# Patient Record
Sex: FEMALE | Race: WHITE | Employment: UNEMPLOYED | URBAN - METROPOLITAN AREA
[De-identification: names, ages, dates, MRNs, and addresses within clinical notes are randomized per-mention and may not be internally consistent; named-entity substitution may affect disease eponyms.]

---

## 2017-01-03 ENCOUNTER — OFFICE VISIT (OUTPATIENT)
Dept: FAMILY MEDICINE CLINIC | Age: 57
End: 2017-01-03

## 2017-01-03 VITALS — DIASTOLIC BLOOD PRESSURE: 90 MMHG | HEART RATE: 130 BPM | SYSTOLIC BLOOD PRESSURE: 152 MMHG

## 2017-01-03 DIAGNOSIS — E66.01 MORBID OBESITY DUE TO EXCESS CALORIES (HCC): ICD-10-CM

## 2017-01-03 DIAGNOSIS — E78.00 PURE HYPERCHOLESTEROLEMIA: ICD-10-CM

## 2017-01-03 DIAGNOSIS — I10 ESSENTIAL HYPERTENSION: Primary | ICD-10-CM

## 2017-01-03 DIAGNOSIS — M17.0 PRIMARY OSTEOARTHRITIS OF BOTH KNEES: ICD-10-CM

## 2017-01-03 DIAGNOSIS — G47.33 OBSTRUCTIVE SLEEP APNEA SYNDROME: ICD-10-CM

## 2017-01-03 DIAGNOSIS — R53.83 FATIGUE, UNSPECIFIED TYPE: ICD-10-CM

## 2017-01-03 DIAGNOSIS — Z23 FLU VACCINE NEED: ICD-10-CM

## 2017-01-03 LAB
ALBUMIN SERPL-MCNC: 4.6 G/DL (ref 3.4–5)
ALP BLD-CCNC: 81 U/L (ref 40–129)
ALT SERPL-CCNC: 28 U/L (ref 10–40)
ANION GAP SERPL CALCULATED.3IONS-SCNC: 22 MMOL/L (ref 3–16)
AST SERPL-CCNC: 24 U/L (ref 15–37)
BILIRUB SERPL-MCNC: 0.5 MG/DL (ref 0–1)
BILIRUBIN DIRECT: <0.2 MG/DL (ref 0–0.3)
BILIRUBIN, INDIRECT: NORMAL MG/DL (ref 0–1)
BUN BLDV-MCNC: 11 MG/DL (ref 7–20)
CALCIUM SERPL-MCNC: 9.5 MG/DL (ref 8.3–10.6)
CHLORIDE BLD-SCNC: 99 MMOL/L (ref 99–110)
CHOLESTEROL, TOTAL: 272 MG/DL (ref 0–199)
CO2: 22 MMOL/L (ref 21–32)
CREAT SERPL-MCNC: 0.7 MG/DL (ref 0.6–1.1)
GFR AFRICAN AMERICAN: >60
GFR NON-AFRICAN AMERICAN: >60
GLUCOSE BLD-MCNC: 102 MG/DL (ref 70–99)
HDLC SERPL-MCNC: 46 MG/DL (ref 40–60)
LDL CHOLESTEROL CALCULATED: ABNORMAL MG/DL
LDL CHOLESTEROL DIRECT: 185 MG/DL
POTASSIUM SERPL-SCNC: 4.1 MMOL/L (ref 3.5–5.1)
SODIUM BLD-SCNC: 143 MMOL/L (ref 136–145)
TOTAL PROTEIN: 7.6 G/DL (ref 6.4–8.2)
TRIGL SERPL-MCNC: 433 MG/DL (ref 0–150)
TSH SERPL DL<=0.05 MIU/L-ACNC: 2.43 UIU/ML (ref 0.27–4.2)
URIC ACID, SERUM: 5.3 MG/DL (ref 2.6–6)
VLDLC SERPL CALC-MCNC: ABNORMAL MG/DL

## 2017-01-03 PROCEDURE — 90471 IMMUNIZATION ADMIN: CPT | Performed by: INTERNAL MEDICINE

## 2017-01-03 PROCEDURE — 90686 IIV4 VACC NO PRSV 0.5 ML IM: CPT | Performed by: INTERNAL MEDICINE

## 2017-01-03 PROCEDURE — 36415 COLL VENOUS BLD VENIPUNCTURE: CPT | Performed by: INTERNAL MEDICINE

## 2017-01-03 PROCEDURE — 99214 OFFICE O/P EST MOD 30 MIN: CPT | Performed by: INTERNAL MEDICINE

## 2017-01-03 RX ORDER — LOSARTAN POTASSIUM 50 MG/1
50 TABLET ORAL DAILY
Qty: 90 TABLET | Refills: 3 | Status: SHIPPED | OUTPATIENT
Start: 2017-01-03 | End: 2017-01-03 | Stop reason: SDUPTHER

## 2017-01-03 RX ORDER — LOSARTAN POTASSIUM 50 MG/1
50 TABLET ORAL DAILY
Qty: 90 TABLET | Refills: 3 | Status: ON HOLD | OUTPATIENT
Start: 2017-01-03 | End: 2017-12-09

## 2017-01-03 ASSESSMENT — ENCOUNTER SYMPTOMS
RESPIRATORY NEGATIVE: 1
ALLERGIC/IMMUNOLOGIC NEGATIVE: 1
GASTROINTESTINAL NEGATIVE: 1
EYES NEGATIVE: 1

## 2017-01-06 RX ORDER — ATORVASTATIN CALCIUM 20 MG/1
20 TABLET, FILM COATED ORAL DAILY
Qty: 30 TABLET | Refills: 3 | Status: SHIPPED | OUTPATIENT
Start: 2017-01-06 | End: 2017-05-20 | Stop reason: SDUPTHER

## 2017-01-27 RX ORDER — NIFEDIPINE 60 MG/1
TABLET, EXTENDED RELEASE ORAL
Qty: 90 TABLET | Refills: 2 | Status: SHIPPED | OUTPATIENT
Start: 2017-01-27 | End: 2017-12-19 | Stop reason: SDUPTHER

## 2017-04-25 ENCOUNTER — OFFICE VISIT (OUTPATIENT)
Dept: ORTHOPEDIC SURGERY | Age: 57
End: 2017-04-25

## 2017-04-25 VITALS — HEIGHT: 68 IN | BODY MASS INDEX: 44.41 KG/M2 | WEIGHT: 293 LBS

## 2017-04-25 DIAGNOSIS — M17.10 LOCALIZED OSTEOARTHROSIS, LOWER LEG: ICD-10-CM

## 2017-04-25 DIAGNOSIS — M25.562 ACUTE PAIN OF LEFT KNEE: Primary | ICD-10-CM

## 2017-04-25 DIAGNOSIS — M17.12 PRIMARY OSTEOARTHRITIS OF LEFT KNEE: ICD-10-CM

## 2017-04-25 PROCEDURE — 99213 OFFICE O/P EST LOW 20 MIN: CPT | Performed by: ORTHOPAEDIC SURGERY

## 2017-04-25 PROCEDURE — 20610 DRAIN/INJ JOINT/BURSA W/O US: CPT | Performed by: ORTHOPAEDIC SURGERY

## 2017-04-25 PROCEDURE — 73560 X-RAY EXAM OF KNEE 1 OR 2: CPT | Performed by: ORTHOPAEDIC SURGERY

## 2017-04-25 RX ORDER — OXYCODONE HYDROCHLORIDE AND ACETAMINOPHEN 5; 325 MG/1; MG/1
1 TABLET ORAL EVERY 6 HOURS PRN
Qty: 28 TABLET | Refills: 0 | Status: SHIPPED | OUTPATIENT
Start: 2017-04-25 | End: 2017-05-02

## 2017-04-28 ENCOUNTER — TELEPHONE (OUTPATIENT)
Dept: ORTHOPEDIC SURGERY | Age: 57
End: 2017-04-28

## 2017-05-22 RX ORDER — ATORVASTATIN CALCIUM 20 MG/1
TABLET, FILM COATED ORAL
Qty: 30 TABLET | Refills: 0 | Status: SHIPPED | OUTPATIENT
Start: 2017-05-22 | End: 2017-06-06 | Stop reason: SDUPTHER

## 2017-06-06 RX ORDER — ATORVASTATIN CALCIUM 20 MG/1
TABLET, FILM COATED ORAL
Qty: 30 TABLET | Refills: 1 | Status: SHIPPED | OUTPATIENT
Start: 2017-06-06 | End: 2017-06-12 | Stop reason: SDUPTHER

## 2017-06-12 ENCOUNTER — OFFICE VISIT (OUTPATIENT)
Dept: FAMILY MEDICINE CLINIC | Age: 57
End: 2017-06-12

## 2017-06-12 VITALS
DIASTOLIC BLOOD PRESSURE: 86 MMHG | HEIGHT: 68 IN | WEIGHT: 293 LBS | HEART RATE: 90 BPM | RESPIRATION RATE: 18 BRPM | OXYGEN SATURATION: 97 % | BODY MASS INDEX: 44.41 KG/M2 | SYSTOLIC BLOOD PRESSURE: 136 MMHG

## 2017-06-12 DIAGNOSIS — E78.00 PURE HYPERCHOLESTEROLEMIA: ICD-10-CM

## 2017-06-12 DIAGNOSIS — I10 ESSENTIAL HYPERTENSION: Primary | ICD-10-CM

## 2017-06-12 DIAGNOSIS — E66.01 MORBID OBESITY DUE TO EXCESS CALORIES (HCC): ICD-10-CM

## 2017-06-12 PROCEDURE — 99214 OFFICE O/P EST MOD 30 MIN: CPT | Performed by: INTERNAL MEDICINE

## 2017-06-12 RX ORDER — ATORVASTATIN CALCIUM 20 MG/1
TABLET, FILM COATED ORAL
Qty: 90 TABLET | Refills: 3 | Status: SHIPPED | OUTPATIENT
Start: 2017-06-12 | End: 2018-11-29 | Stop reason: SDUPTHER

## 2017-06-12 ASSESSMENT — ENCOUNTER SYMPTOMS
RESPIRATORY NEGATIVE: 1
ALLERGIC/IMMUNOLOGIC NEGATIVE: 1

## 2017-07-05 ENCOUNTER — NURSE ONLY (OUTPATIENT)
Dept: FAMILY MEDICINE CLINIC | Age: 57
End: 2017-07-05

## 2017-07-05 DIAGNOSIS — E78.00 PURE HYPERCHOLESTEROLEMIA: ICD-10-CM

## 2017-07-05 DIAGNOSIS — I10 ESSENTIAL HYPERTENSION: ICD-10-CM

## 2017-07-05 DIAGNOSIS — E66.01 MORBID OBESITY DUE TO EXCESS CALORIES (HCC): ICD-10-CM

## 2017-07-05 LAB
ALBUMIN SERPL-MCNC: 4.2 G/DL (ref 3.4–5)
ALP BLD-CCNC: 91 U/L (ref 40–129)
ALT SERPL-CCNC: 17 U/L (ref 10–40)
ANION GAP SERPL CALCULATED.3IONS-SCNC: 14 MMOL/L (ref 3–16)
AST SERPL-CCNC: 13 U/L (ref 15–37)
BILIRUB SERPL-MCNC: 0.4 MG/DL (ref 0–1)
BILIRUBIN DIRECT: <0.2 MG/DL (ref 0–0.3)
BILIRUBIN, INDIRECT: ABNORMAL MG/DL (ref 0–1)
BUN BLDV-MCNC: 10 MG/DL (ref 7–20)
CALCIUM SERPL-MCNC: 9.5 MG/DL (ref 8.3–10.6)
CHLORIDE BLD-SCNC: 101 MMOL/L (ref 99–110)
CHOLESTEROL, TOTAL: 127 MG/DL (ref 0–199)
CO2: 26 MMOL/L (ref 21–32)
CREAT SERPL-MCNC: 0.6 MG/DL (ref 0.6–1.1)
GFR AFRICAN AMERICAN: >60
GFR NON-AFRICAN AMERICAN: >60
GLUCOSE BLD-MCNC: 103 MG/DL (ref 70–99)
HDLC SERPL-MCNC: 34 MG/DL (ref 40–60)
LDL CHOLESTEROL CALCULATED: 61 MG/DL
POTASSIUM SERPL-SCNC: 4.7 MMOL/L (ref 3.5–5.1)
SODIUM BLD-SCNC: 141 MMOL/L (ref 136–145)
TOTAL PROTEIN: 7.3 G/DL (ref 6.4–8.2)
TRIGL SERPL-MCNC: 161 MG/DL (ref 0–150)
VLDLC SERPL CALC-MCNC: 32 MG/DL

## 2017-07-05 PROCEDURE — 36415 COLL VENOUS BLD VENIPUNCTURE: CPT | Performed by: INTERNAL MEDICINE

## 2017-07-20 DIAGNOSIS — M17.10 LOCALIZED OSTEOARTHROSIS, LOWER LEG: Primary | ICD-10-CM

## 2017-07-21 ENCOUNTER — TELEPHONE (OUTPATIENT)
Dept: ORTHOPEDIC SURGERY | Age: 57
End: 2017-07-21

## 2017-08-22 ENCOUNTER — OFFICE VISIT (OUTPATIENT)
Dept: ORTHOPEDIC SURGERY | Age: 57
End: 2017-08-22

## 2017-08-22 VITALS — HEIGHT: 68 IN | WEIGHT: 293 LBS | BODY MASS INDEX: 44.41 KG/M2

## 2017-08-22 DIAGNOSIS — M84.452G: ICD-10-CM

## 2017-08-22 DIAGNOSIS — M84.469G INSUFFICIENCY FRACTURE OF TIBIA, WITH DELAYED HEALING, SUBSEQUENT ENCOUNTER: ICD-10-CM

## 2017-08-22 DIAGNOSIS — M17.12 PRIMARY OSTEOARTHRITIS OF LEFT KNEE: Primary | ICD-10-CM

## 2017-08-22 DIAGNOSIS — M23.204 DEGENERATIVE TEAR OF MEDIAL MENISCUS OF LEFT KNEE: ICD-10-CM

## 2017-08-22 PROBLEM — M84.469A INSUFFICIENCY FRACTURE OF TIBIA: Status: ACTIVE | Noted: 2017-08-22

## 2017-08-22 PROBLEM — M84.453A INSUFFICIENCY FRACTURE OF MEDIAL FEMORAL CONDYLE (HCC): Status: ACTIVE | Noted: 2017-08-22

## 2017-08-22 PROCEDURE — 99213 OFFICE O/P EST LOW 20 MIN: CPT | Performed by: ORTHOPAEDIC SURGERY

## 2017-08-22 RX ORDER — CITALOPRAM 40 MG/1
40 TABLET ORAL NIGHTLY
COMMUNITY

## 2017-09-28 ENCOUNTER — TELEPHONE (OUTPATIENT)
Dept: ORTHOPEDIC SURGERY | Age: 57
End: 2017-09-28

## 2017-10-06 ENCOUNTER — OFFICE VISIT (OUTPATIENT)
Dept: FAMILY MEDICINE CLINIC | Age: 57
End: 2017-10-06

## 2017-10-06 VITALS
BODY MASS INDEX: 44.41 KG/M2 | DIASTOLIC BLOOD PRESSURE: 76 MMHG | HEIGHT: 68 IN | SYSTOLIC BLOOD PRESSURE: 118 MMHG | WEIGHT: 293 LBS

## 2017-10-06 DIAGNOSIS — Z01.818 PRE-OP EXAM: ICD-10-CM

## 2017-10-06 DIAGNOSIS — G89.29 CHRONIC PAIN OF LEFT KNEE: Primary | ICD-10-CM

## 2017-10-06 DIAGNOSIS — M25.562 CHRONIC PAIN OF LEFT KNEE: Primary | ICD-10-CM

## 2017-10-06 PROCEDURE — 93000 ELECTROCARDIOGRAM COMPLETE: CPT | Performed by: PHYSICIAN ASSISTANT

## 2017-10-06 PROCEDURE — 99242 OFF/OP CONSLTJ NEW/EST SF 20: CPT | Performed by: PHYSICIAN ASSISTANT

## 2017-10-06 NOTE — PROGRESS NOTES
[facility]   Pre-operative History and Physical      DIAGNOSIS:    1. Chronic pain of left knee     2. Pre-op exam  EKG 12 Lead         PROCEDURE:  Arthroscopic left knee surgery      History Obtained From:  patient    HISTORY OF PRESENT ILLNESS:    Forbes Gaucher 1960 is a 59 y.o. female who presents for pre-operative evaluation.     Past Medical History:    Past Medical History:   Diagnosis Date    Anxiety     with panic attacks    Arthritis     knees    Chronic back pain     Depression     Hyperlipidemia     borderline    Hypertension     IBS (irritable bowel syndrome)     with rectal bleeding    Knee osteoarthritis 1/25/2013    Morbid obesity ([address]     Positive H. pylori test 12/10/12    Unspecified sleep apnea     not diagnosed     Past Surgical History:    Past Surgical History:   Procedure Laterality Date    CHOLECYSTECTOMY  2008    COLONOSCOPY      due age 46    COLONOSCOPY  12/10/12    FOOT FRACTURE SURGERY  2002    FOOT SURGERY  11/2011    Right peroneal tendon Wendy Parry); ACP peroneal tendons with US guided injection    TONSILLECTOMY  1963    TOTAL KNEE ARTHROPLASTY  1/29/13    RIGHT TOTAL KNEE REPLACEMENT    UPPER GASTROINTESTINAL ENDOSCOPY  12/10/2012    gastritis       Current Medication  Current Outpatient Prescriptions   Medication Sig Dispense Refill    CLONAZEPAM PO Take 1 mg by mouth daily as needed      buPROPion (WELLBUTRIN XL) 300 MG extended release tablet Take 300 mg by mouth every morning      citalopram (CELEXA) 40 MG tablet Take 40 mg by mouth nightly       atorvastatin (LIPITOR) 20 MG tablet TAKE 1 TABLET BY MOUTH DAILY 90 tablet 3    NIFEdipine (NIFEDICAL XL) 60 MG extended release tablet TAKE 1 TABLET BY MOUTH DAILY 90 tablet 2    losartan (COZAAR) 50 MG tablet Take 1 tablet by mouth daily 90 tablet 3    traZODone (DESYREL) 50 MG tablet Take 100 mg by mouth nightly       clonazepam (KLONOPIN) 2 MG tablet Take 2 mg by mouth nightly       VILAZODONE HCL 40 MG Tablet Take 1 tablet by mouth nightly   2     No current facility-administered medications for this visit. Allergies:  Review of patient's allergies indicates no known allergies. History of allergic reaction to anesthesia:  No  Teeth: no dentures or caps    Social History:   History   Smoking Status    Former Smoker    Packs/day: 1.50    Years: 21.00    Quit date: 12/1/2015   Smokeless Tobacco    Current User     Comment: still chews nicotine gum 2mg (20 pieces per day)     The patient states she drinks 0 per week. Family History:   Family History   Problem Relation Age of Onset    Cancer Mother      pancreatic cancer    High Cholesterol Mother     Arthritis Mother     High Blood Pressure Father     Diabetes Father     Obesity Father     Other Father      gout, spinal stenosis    Arthritis Sister     Arthritis Maternal Grandmother     Substance Abuse Maternal Grandfather     High Blood Pressure Paternal Grandmother        REVIEW OF SYSTEMS:    CONSTITUTIONAL:  negative  EYES:  negative  HEENT:  negative  RESPIRATORY:  negative  CARDIOVASCULAR:  negative  GASTROINTESTINAL:  negative  GENITOURINARY:  negative  INTEGUMENT/BREAST:  negative  HEMATOLOGIC/LYMPHATIC:  negative  ALLERGIC/IMMUNOLOGIC:  negative  ENDOCRINE:  negative  MUSCULOSKELETAL:  positive for left knee pain  NEUROLOGICAL:  negative    PHYSICAL EXAM:      /76  Ht 5' 8\" (1.727 m)  Wt (!) 306 lb (138.8 kg)  BMI 46.53 kg/m2    Eyes:  Lids and lashes normal, pupils equal, round and reactive to light, extra ocular muscles intact, sclera clear, conjunctiva normal  Head/ENT:  Normocephalic, without obvious abnormality, atraumatic, sinuses nontender on palpation, external ears without lesions, oral pharynx with moist mucus membranes, tonsils without erythema or exudates, gums normal and good dentition.   Neck:  Supple, symmetrical, trachea midline, no adenopathy, thyroid symmetric, not enlarged and no tenderness, skin normal  Heart:  Normal apical impulse, regular rate and rhythm, normal S1 and S2, no S3 or S4, and no murmur noted  Lungs:  No increased work of breathing, good air exchange, clear to auscultation bilaterally, no crackles or wheezing  Abdomen:   normal bowel sounds, soft, non-distended, non-tender, no masses palpated, no hepatosplenomegally  Extremities:  No clubbing, cyanosis, or edema      DATA:  EKG: see attached    ASSESSMENT AND PLAN:    1. Patient is a 59 y.o. female with above specified procedure planned on 10/13/17 with Dr. Fernando Choi at [facility]. 2. Stop asa and nsaid medications 7-10 days prior to procedure. Eulalio Swann is Medically stable for surgery. Report will be faxed.

## 2017-10-13 ENCOUNTER — HOSPITAL ENCOUNTER (OUTPATIENT)
Dept: SURGERY | Age: 57
Discharge: HOME | End: 2017-10-13
Attending: ORTHOPAEDIC SURGERY | Admitting: ORTHOPAEDIC SURGERY

## 2017-10-13 VITALS
SYSTOLIC BLOOD PRESSURE: 168 MMHG | BODY MASS INDEX: 44.41 KG/M2 | TEMPERATURE: 98.4 F | HEART RATE: 74 BPM | HEIGHT: 68 IN | DIASTOLIC BLOOD PRESSURE: 104 MMHG | RESPIRATION RATE: 16 BRPM | OXYGEN SATURATION: 92 % | WEIGHT: 293 LBS

## 2017-10-13 DIAGNOSIS — R52 PAIN: ICD-10-CM

## 2017-10-13 LAB
ANION GAP SERPL CALCULATED.3IONS-SCNC: 13 MMOL/L (ref 3–16)
BUN BLDV-MCNC: 15 MG/DL (ref 7–20)
CALCIUM SERPL-MCNC: 8.7 MG/DL (ref 8.3–10.6)
CHLORIDE BLD-SCNC: 100 MMOL/L (ref 99–110)
CO2: 26 MMOL/L (ref 21–32)
CREAT SERPL-MCNC: 0.6 MG/DL (ref 0.6–1.1)
GFR AFRICAN AMERICAN: >60
GFR NON-AFRICAN AMERICAN: >60
GLUCOSE BLD-MCNC: 102 MG/DL (ref 70–99)
POTASSIUM SERPL-SCNC: 3.7 MMOL/L (ref 3.5–5.1)
SODIUM BLD-SCNC: 139 MMOL/L (ref 136–145)

## 2017-10-13 RX ORDER — MEPERIDINE HYDROCHLORIDE 50 MG/ML
12.5 INJECTION INTRAMUSCULAR; INTRAVENOUS; SUBCUTANEOUS EVERY 5 MIN PRN
Status: DISCONTINUED | OUTPATIENT
Start: 2017-10-13 | End: 2017-10-14 | Stop reason: HOSPADM

## 2017-10-13 RX ORDER — OXYCODONE HYDROCHLORIDE AND ACETAMINOPHEN 5; 325 MG/1; MG/1
1 TABLET ORAL PRN
Status: ACTIVE | OUTPATIENT
Start: 2017-10-13 | End: 2017-10-13

## 2017-10-13 RX ORDER — MORPHINE SULFATE 2 MG/ML
2 INJECTION, SOLUTION INTRAMUSCULAR; INTRAVENOUS EVERY 5 MIN PRN
Status: DISCONTINUED | OUTPATIENT
Start: 2017-10-13 | End: 2017-10-14 | Stop reason: HOSPADM

## 2017-10-13 RX ORDER — OXYCODONE HYDROCHLORIDE AND ACETAMINOPHEN 5; 325 MG/1; MG/1
2 TABLET ORAL PRN
Status: ACTIVE | OUTPATIENT
Start: 2017-10-13 | End: 2017-10-13

## 2017-10-13 RX ORDER — MORPHINE SULFATE 2 MG/ML
1 INJECTION, SOLUTION INTRAMUSCULAR; INTRAVENOUS EVERY 5 MIN PRN
Status: DISCONTINUED | OUTPATIENT
Start: 2017-10-13 | End: 2017-10-14 | Stop reason: HOSPADM

## 2017-10-13 RX ORDER — SODIUM CHLORIDE 0.9 % (FLUSH) 0.9 %
10 SYRINGE (ML) INJECTION PRN
Status: DISCONTINUED | OUTPATIENT
Start: 2017-10-13 | End: 2017-10-14 | Stop reason: HOSPADM

## 2017-10-13 RX ORDER — PROMETHAZINE HYDROCHLORIDE 25 MG/ML
6.25 INJECTION, SOLUTION INTRAMUSCULAR; INTRAVENOUS
Status: DISCONTINUED | OUTPATIENT
Start: 2017-10-13 | End: 2017-10-14 | Stop reason: HOSPADM

## 2017-10-13 RX ORDER — LIDOCAINE HYDROCHLORIDE 10 MG/ML
1 INJECTION, SOLUTION EPIDURAL; INFILTRATION; INTRACAUDAL; PERINEURAL
Status: COMPLETED | OUTPATIENT
Start: 2017-10-13 | End: 2017-10-13

## 2017-10-13 RX ORDER — DIPHENHYDRAMINE HYDROCHLORIDE 50 MG/ML
12.5 INJECTION INTRAMUSCULAR; INTRAVENOUS
Status: ACTIVE | OUTPATIENT
Start: 2017-10-13 | End: 2017-10-13

## 2017-10-13 RX ORDER — ACETAMINOPHEN 10 MG/ML
1000 INJECTION, SOLUTION INTRAVENOUS
Status: ACTIVE | OUTPATIENT
Start: 2017-10-13 | End: 2017-10-13

## 2017-10-13 RX ORDER — HYDRALAZINE HYDROCHLORIDE 20 MG/ML
5 INJECTION INTRAMUSCULAR; INTRAVENOUS EVERY 10 MIN PRN
Status: DISCONTINUED | OUTPATIENT
Start: 2017-10-13 | End: 2017-10-14 | Stop reason: HOSPADM

## 2017-10-13 RX ORDER — ONDANSETRON 2 MG/ML
4 INJECTION INTRAMUSCULAR; INTRAVENOUS PRN
Status: DISCONTINUED | OUTPATIENT
Start: 2017-10-13 | End: 2017-10-14 | Stop reason: HOSPADM

## 2017-10-13 RX ORDER — PROMETHAZINE HYDROCHLORIDE 25 MG/1
25 TABLET ORAL EVERY 6 HOURS PRN
Qty: 28 TABLET | Refills: 0 | Status: SHIPPED | OUTPATIENT
Start: 2017-10-13 | End: 2017-10-20

## 2017-10-13 RX ORDER — LABETALOL HYDROCHLORIDE 5 MG/ML
5 INJECTION, SOLUTION INTRAVENOUS EVERY 10 MIN PRN
Status: DISCONTINUED | OUTPATIENT
Start: 2017-10-13 | End: 2017-10-14 | Stop reason: HOSPADM

## 2017-10-13 RX ORDER — SODIUM CHLORIDE 0.9 % (FLUSH) 0.9 %
10 SYRINGE (ML) INJECTION EVERY 12 HOURS SCHEDULED
Status: DISCONTINUED | OUTPATIENT
Start: 2017-10-13 | End: 2017-10-14 | Stop reason: HOSPADM

## 2017-10-13 RX ORDER — SODIUM CHLORIDE, SODIUM LACTATE, POTASSIUM CHLORIDE, CALCIUM CHLORIDE 600; 310; 30; 20 MG/100ML; MG/100ML; MG/100ML; MG/100ML
INJECTION, SOLUTION INTRAVENOUS CONTINUOUS
Status: DISCONTINUED | OUTPATIENT
Start: 2017-10-13 | End: 2017-10-14 | Stop reason: HOSPADM

## 2017-10-13 RX ADMIN — LIDOCAINE HYDROCHLORIDE 1 ML: 10 INJECTION, SOLUTION EPIDURAL; INFILTRATION; INTRACAUDAL; PERINEURAL at 09:52

## 2017-10-13 RX ADMIN — SODIUM CHLORIDE, SODIUM LACTATE, POTASSIUM CHLORIDE, CALCIUM CHLORIDE: 600; 310; 30; 20 INJECTION, SOLUTION INTRAVENOUS at 09:52

## 2017-10-13 NOTE — PROGRESS NOTES
Transfer received from PACU. Awake and oriented. SaO2 wnl- using IS with good effort. Drinking fluids and eating crackers. Son called to bedside, call light in reach.

## 2017-10-13 NOTE — PROCEDURES
Eulalio Swann   1960  59 y.o. ULTRASOUND GUIDED ADDUCTOR CANAL FEMORAL NERVE BLOCK   Discussed with patient risks, benefits and alternatives of block (including but not limited to infection, bleeding, and damage to nerves) all questions answered patient agrees with planned procedure  Surgical procedure: Repair insufficiency fracture knee  Side: left  Requested by  for post operative pain control time-out completed   Pt sedated with Versed 2mg  Monitor on, patient supine, and site prepped with Chloraprep  Lidocaine 1% used for topical local anesthetic  21 gauge 100 mm Stimuplex needle used  Continuous Inplane dynamic ultrasound technique used, relevant anatomy identified (nerves, vessels, muscles), Local anesthetic spread visualized around nerves  Bupivacaine 0.5% 20cc injected in 5 cc increments after negative aspiration each time. Pt tolerated procedure well. No complications.   Comments:     Titi Turner

## 2017-10-13 NOTE — ANESTHESIA PRE-OP
 clonazepam (KLONOPIN) 2 MG tablet Take 2 mg by mouth nightly        No current facility-administered medications for this encounter.         Allergies:  No Known Allergies    Problem List:    Patient Active Problem List   Diagnosis Code    Essential hypertension I10    Pure hypercholesterolemia E78.00    Anxiety F41.9    Essential hypertension I10    Obstructive sleep apnea syndrome G47.33    Chronic back pain M54.9, G89.29    IBS (irritable bowel syndrome) K58.9    Morbid obesity due to excess calories ([facility]) E66.01    Positive H. pylori test A04.8    Knee osteoarthritis M17.10    Right TKR Z96.659    Localized osteoarthrosis, lower leg M17.10    Acute pain of left knee M25.562    Insufficiency fracture of medial femoral condyle (HCC) M84.453A    Degenerative tear of medial meniscus of left knee M23.204    Primary osteoarthritis of left knee M17.12    Insufficiency fracture of tibia M84.469A       Past Medical History:        Diagnosis Date    Anxiety     with panic attacks    Arthritis     knees    Chronic back pain     Depression     Hyperlipidemia     borderline    Hypertension     IBS (irritable bowel syndrome)     with rectal bleeding    Knee osteoarthritis 1/25/2013    Morbid obesity ([address]     Positive H. pylori test 12/10/12    Unspecified sleep apnea     not diagnosed       Past Surgical History:        Procedure Laterality Date    CHOLECYSTECTOMY  2008    COLONOSCOPY      due age 46    COLONOSCOPY  12/10/12    FOOT FRACTURE SURGERY  2002    FOOT SURGERY  11/2011    Right peroneal tendon Manolo Yoder); ACP peroneal tendons with US guided injection    TONSILLECTOMY  1963    TOTAL KNEE ARTHROPLASTY  1/29/13    RIGHT TOTAL KNEE REPLACEMENT    UPPER GASTROINTESTINAL ENDOSCOPY  12/10/2012    gastritis       Social History:    Social History   Substance Use Topics    Smoking status: Former Smoker     Packs/day: 1.50     Years: 21.00     Quit date: 12/1/2015    Smokeless Former Smoker     Packs/day: 1.50     Years: 21.00     Quit date: 12/1/2015    Smokeless tobacco: Never Used      Comment: still chews nicotine gum 2mg (20 pieces per day)    Alcohol use No       LABS:    CBC  Lab Results   Component Value Date/Time    WBC 7.1 01/30/2016 01:43 PM    HGB 16.3 (H) 01/30/2016 01:43 PM    HCT 48.7 (H) 01/30/2016 01:43 PM     01/30/2016 01:43 PM     RENAL  Lab Results   Component Value Date/Time     10/13/2017 09:35 AM    K 3.7 10/13/2017 09:35 AM     10/13/2017 09:35 AM    CO2 26 10/13/2017 09:35 AM    BUN 15 10/13/2017 09:35 AM    CREATININE 0.6 10/13/2017 09:35 AM    GLUCOSE 102 (H) 10/13/2017 09:35 AM     COAGS  Lab Results   Component Value Date/Time    PROTIME 10.0 01/24/2013 01:50 PM    INR 0.87 01/24/2013 01:50 PM    APTT 31.9 01/24/2013 01:50 PM         Ova MD Edenilson   10/13/2017

## 2017-10-15 NOTE — OP NOTE
[id number]                               OPERATIVE REPORT    PATIENT NAME: Mihir Clemens                      :             1960  MED REC NO:   [id number]                           ROOM:  ACCOUNT NO:   [de-identified]                           ADMISSION DATE:  10/13/2017  PROVIDER:     Jeanine Bolivar MD    DATE OF PROCEDURE:  10/13/2017    PREOPERATIVE DIAGNOSES:  Left knee medial meniscus tear,  osteoarthritis, _____ loose body, insufficiency fracture of the medial  femoral condyle and medial tibial plateau. OPERATION PERFORMED:  Left knee video arthroscopy, partial medial  meniscectomy, loose body removal and internal fixation of  insufficiency fractures in the medial femoral condyle and medial  tibial plateau with bone substitute under fluoroscopic guidance. SURGEON:  Jeanine Bolivar MD    ANESTHESIA:  General, leg block. BLOOD LOSS:  Less than 5 mL. COMPLICATIONS:  None noted. INDICATIONS FOR OPERATION:  A 49-year-old female who presented with  recurrent left knee pain consistent with loose body, internal  derangement, medial meniscus tear, and osteoarthritis and an MRI  confirmed that she also had insufficiency fractures of the medial  femoral condyle and medial tibial plateau. After failure of other  modalities, she elected for the recommendation of surgical  intervention. DESCRIPTION OF THE OPERATION:  The patient was taken to the operating  room where general anesthetic was obtained. Antibiotics were given IV  piggyback preoperatively. A tourniquet was placed around the left  proximal thigh, but was not inflated during the case. The leg was  sterilely prepped and draped and a two-portal arthroscopy of the left  knee was carried out in the usual fashion using a 30-degree  arthroscope.   Upon entry into the knee, she was noted to have a grade  3 osteoarthritis and some focal grade 4

## 2017-10-17 NOTE — ADDENDUM NOTE
Encounter addended by: Sarai Valencia MA on: 10/17/2017  9:25 AM<BR>    Actions taken: Letter status changed

## 2017-10-23 ENCOUNTER — OFFICE VISIT (OUTPATIENT)
Dept: ORTHOPEDIC SURGERY | Age: 57
End: 2017-10-23

## 2017-10-23 VITALS — WEIGHT: 293 LBS | HEIGHT: 68 IN | BODY MASS INDEX: 44.41 KG/M2

## 2017-10-23 DIAGNOSIS — M17.12 PRIMARY OSTEOARTHRITIS OF LEFT KNEE: Primary | ICD-10-CM

## 2017-10-23 DIAGNOSIS — M84.453A INSUFFICIENCY FRACTURE OF MEDIAL FEMORAL CONDYLE (HCC): ICD-10-CM

## 2017-10-23 DIAGNOSIS — M23.204 DEGENERATIVE TEAR OF MEDIAL MENISCUS OF LEFT KNEE: ICD-10-CM

## 2017-10-23 DIAGNOSIS — M84.469D INSUFFICIENCY FRACTURE OF TIBIA WITH ROUTINE HEALING, SUBSEQUENT ENCOUNTER: ICD-10-CM

## 2017-10-23 PROCEDURE — 99024 POSTOP FOLLOW-UP VISIT: CPT | Performed by: ORTHOPAEDIC SURGERY

## 2017-10-23 NOTE — PATIENT INSTRUCTIONS
Patient Education        Knee: Exercises  Your Care Instructions  Here are some examples of exercises for your knee. Start each exercise slowly. Ease off the exercise if you start to have pain. Your doctor or physical therapist will tell you when you can start these exercises and which ones will work best for you. How to do the exercises  Quad sets    1. Sit with your leg straight and supported on the floor or a firm bed. (If you feel discomfort in the front or back of your knee, place a small towel roll under your knee.)  2. Tighten the muscles on top of your thigh by pressing the back of your knee flat down to the floor. (If you feel discomfort under your kneecap, place a small towel roll under your knee.)  3. Hold for about 6 seconds, then rest for up to 10 seconds. 4. Do 8 to 12 repetitions several times a day. Straight-leg raises to the front    1. Lie on your back with your good knee bent so that your foot rests flat on the floor. Your injured leg should be straight. Make sure that your low back has a normal curve. You should be able to slip your flat hand in between the floor and the small of your back, with your palm touching the floor and your back touching the back of your hand. 2. Tighten the thigh muscles in the injured leg by pressing the back of your knee flat down to the floor. Hold your knee straight. 3. Keeping the thigh muscles tight, lift your injured leg up so that your heel is about 12 inches off the floor. Hold for about 6 seconds and then lower slowly. 4. Do 8 to 12 repetitions, 3 times a day. Straight-leg raises to the outside    1. Lie on your side, with your injured leg on top. 2. Tighten the front thigh muscles of your injured leg to keep your knee straight. 3. Keep your hip and your leg straight in line with the rest of your body, and keep your knee pointing forward. Do not drop your hip back.   4. Lift your injured leg straight up toward the ceiling, about 12 inches off the floor. Hold for about 6 seconds, then slowly lower your leg. 5. Do 8 to 12 repetitions. Straight-leg raises to the back    1. Lie on your stomach, and lift your leg straight up behind you (toward the ceiling). 2. Lift your toes about 6 inches off the floor, hold for about 6 seconds, then lower slowly. 3. Do 8 to 12 repetitions. Straight-leg raises to the inside    1. Lie on the side of your body with the injured leg. 2. You can either prop your other (good) leg up on a chair, or you can bend your good knee and put that foot in front of your injured knee. Do not drop your hip back. 3. Tighten the muscles on the front of your thigh to straighten your injured knee. 4. Keep your kneecap pointing forward, and lift your whole leg up toward the ceiling about 6 inches. Hold for about 6 seconds, then lower slowly. 5. Do 8 to 12 repetitions. Heel dig bridging    1. Lie on your back with both knees bent and your ankles bent so that only your heels are digging into the floor. Your knees should be bent about 90 degrees. 2. Then push your heels into the floor, squeeze your buttocks, and lift your hips off the floor until your shoulders, hips, and knees are all in a straight line. 3. Hold for about 6 seconds as you continue to breathe normally, and then slowly lower your hips back down to the floor and rest for up to 10 seconds. 4. Do 8 to 12 repetitions. Hamstring curls    1. Lie on your stomach with your knees straight. If your kneecap is uncomfortable, roll up a washcloth and put it under your leg just above your kneecap. 2. Lift the foot of your injured leg by bending the knee so that you bring the foot up toward your buttock. If this motion hurts, try it without bending your knee quite as far. This may help you avoid any painful motion. 3. Slowly lower your leg back to the floor. 4. Do 8 to 12 repetitions.   5. With permission from your doctor or physical therapist, you may also want to add a cuff weight to your ankle (not more than 5 pounds). With weight, you do not have to lift your leg more than 12 inches to get a hamstring workout. Shallow standing knee bends    Note: Do this exercise only if you have very little pain; if you have no clicking, locking, or giving way if you have an injured knee; and if it does not hurt while you are doing 8 to 12 repetitions. 1. Stand with your hands lightly resting on a counter or chair in front of you. Put your feet shoulder-width apart. 2. Slowly bend your knees so that you squat down like you are going to sit in a chair. Make sure your knees do not go in front of your toes. 3. Lower yourself about 6 inches. Your heels should remain on the floor at all times. 4. Rise slowly to a standing position. Heel raises    1. Stand with your feet a few inches apart, with your hands lightly resting on a counter or chair in front of you. 2. Slowly raise your heels off the floor while keeping your knees straight. 3. Hold for about 6 seconds, then slowly lower your heels to the floor. 4. Do 8 to 12 repetitions several times during the day. Follow-up care is a key part of your treatment and safety. Be sure to make and go to all appointments, and call your doctor if you are having problems. It's also a good idea to know your test results and keep a list of the medicines you take. Where can you learn more? Go to [email or web address] Solaire.Matrimony.com. org and sign in to your [employer] account. Enter C292 in the [facility] box to learn more about \"Knee: Exercises. \"     If you do not have an account, please click on the \"Sign Up Now\" link. Current as of: March 21, 2017  Content Version: 11.3  © 3166-8939 [employer]. Care instructions adapted under license by [facility] ([facility]).  If you have questions about a medical condition or this instruction, always ask your healthcare professional. [place] [address] any warranty or liability for your use of this cuff weight to your ankle (not more than 5 pounds). With weight, you do not have to lift your leg more than 12 inches to get a hamstring workout. Shallow standing knee bends    Note: Do this exercise only if you have very little pain; if you have no clicking, locking, or giving way if you have an injured knee; and if it does not hurt while you are doing 8 to 12 repetitions. 5. Stand with your hands lightly resting on a counter or chair in front of you. Put your feet shoulder-width apart. 6. Slowly bend your knees so that you squat down like you are going to sit in a chair. Make sure your knees do not go in front of your toes. 7. Lower yourself about 6 inches. Your heels should remain on the floor at all times. 8. Rise slowly to a standing position. Heel raises    5. Stand with your feet a few inches apart, with your hands lightly resting on a counter or chair in front of you. 6. Slowly raise your heels off the floor while keeping your knees straight. 7. Hold for about 6 seconds, then slowly lower your heels to the floor. 8. Do 8 to 12 repetitions several times during the day. Follow-up care is a key part of your treatment and safety. Be sure to make and go to all appointments, and call your doctor if you are having problems. It's also a good idea to know your test results and keep a list of the medicines you take. Where can you learn more? Go to https://AppMeshpeCloudvu.Slyce. org and sign in to your [employer] account. Enter U147 in the [facility] box to learn more about \"Knee: Exercises. \"     If you do not have an account, please click on the \"Sign Up Now\" link. Current as of: March 21, 2017  Content Version: 11.3  © 9347-1517 [employer]. Care instructions adapted under license by [facility] ([facility]).  If you have questions about a medical condition or this instruction, always ask your healthcare professional. [address] any warranty or liability for your

## 2017-11-21 ENCOUNTER — OFFICE VISIT (OUTPATIENT)
Dept: ORTHOPEDIC SURGERY | Age: 57
End: 2017-11-21

## 2017-11-21 VITALS
WEIGHT: 293 LBS | SYSTOLIC BLOOD PRESSURE: 129 MMHG | DIASTOLIC BLOOD PRESSURE: 82 MMHG | HEIGHT: 68 IN | HEART RATE: 77 BPM | BODY MASS INDEX: 44.41 KG/M2

## 2017-11-21 DIAGNOSIS — M17.12 PRIMARY OSTEOARTHRITIS OF LEFT KNEE: Primary | ICD-10-CM

## 2017-11-21 DIAGNOSIS — M84.453A INSUFFICIENCY FRACTURE OF MEDIAL FEMORAL CONDYLE (HCC): ICD-10-CM

## 2017-11-21 DIAGNOSIS — M84.469D INSUFFICIENCY FRACTURE OF TIBIA WITH ROUTINE HEALING, SUBSEQUENT ENCOUNTER: ICD-10-CM

## 2017-11-21 PROCEDURE — 99024 POSTOP FOLLOW-UP VISIT: CPT | Performed by: ORTHOPAEDIC SURGERY

## 2017-11-21 NOTE — PROGRESS NOTES
FOLLOW-UP VISIT      The patient returns for follow-up s/p left knee video arthroscopy with partial medial meniscectomy, loose body removal, and internal fixation of insufficiency fractures in the medial tibial plateau and medial femoral condyle with bone substitute. Date of Surgery    10/13/17    Wound Status    Incisions are healing well with no surrounding erythema, and minimal ecchymosis. Exam    She has no signs of infection or DVT. She is walking without ambulatory aids happy with her result to date. Plan    Slow return to normal activity at home directed physical therapy program.    Follow-up Appointment    P.thomas.

## 2017-11-27 ENCOUNTER — OFFICE VISIT (OUTPATIENT)
Dept: FAMILY MEDICINE CLINIC | Age: 57
End: 2017-11-27

## 2017-11-27 VITALS
BODY MASS INDEX: 44.41 KG/M2 | OXYGEN SATURATION: 97 % | WEIGHT: 293 LBS | HEART RATE: 93 BPM | DIASTOLIC BLOOD PRESSURE: 86 MMHG | HEIGHT: 68 IN | SYSTOLIC BLOOD PRESSURE: 122 MMHG | TEMPERATURE: 98.6 F

## 2017-11-27 DIAGNOSIS — Z01.818 PRE-OP EXAM: ICD-10-CM

## 2017-11-27 DIAGNOSIS — Z23 NEEDS FLU SHOT: ICD-10-CM

## 2017-11-27 DIAGNOSIS — E66.01 MORBID OBESITY DUE TO EXCESS CALORIES (HCC): Primary | ICD-10-CM

## 2017-11-27 PROCEDURE — G8417 CALC BMI ABV UP PARAM F/U: HCPCS | Performed by: PHYSICIAN ASSISTANT

## 2017-11-27 PROCEDURE — 99242 OFF/OP CONSLTJ NEW/EST SF 20: CPT | Performed by: PHYSICIAN ASSISTANT

## 2017-11-27 PROCEDURE — G8484 FLU IMMUNIZE NO ADMIN: HCPCS | Performed by: PHYSICIAN ASSISTANT

## 2017-11-27 PROCEDURE — 90471 IMMUNIZATION ADMIN: CPT | Performed by: PHYSICIAN ASSISTANT

## 2017-11-27 PROCEDURE — 90715 TDAP VACCINE 7 YRS/> IM: CPT | Performed by: PHYSICIAN ASSISTANT

## 2017-11-27 PROCEDURE — 90472 IMMUNIZATION ADMIN EACH ADD: CPT | Performed by: PHYSICIAN ASSISTANT

## 2017-11-27 PROCEDURE — 3014F SCREEN MAMMO DOC REV: CPT | Performed by: PHYSICIAN ASSISTANT

## 2017-11-27 PROCEDURE — 90686 IIV4 VACC NO PRSV 0.5 ML IM: CPT | Performed by: PHYSICIAN ASSISTANT

## 2017-11-27 PROCEDURE — 3017F COLORECTAL CA SCREEN DOC REV: CPT | Performed by: PHYSICIAN ASSISTANT

## 2017-11-27 PROCEDURE — G8427 DOCREV CUR MEDS BY ELIG CLIN: HCPCS | Performed by: PHYSICIAN ASSISTANT

## 2017-11-27 NOTE — PROGRESS NOTES
Vaccine Information Sheet, \"Influenza - Inactivated\"  given to Eulalio Swann, or parent/legal guardian of  Eulalio Swann and verbalized understanding. Patient responses:    Have you ever had a reaction to a flu vaccine? No  Are you able to eat eggs without adverse effects? Yes  Do you have any current illness? No  Have you ever had Guillian [place] Syndrome? No    Flu vaccine given per order. Please see immunization tab.
reactive to light, extra ocular muscles intact, sclera clear, conjunctiva normal  Head/ENT:  Normocephalic, without obvious abnormality, atraumatic, sinuses nontender on palpation, external ears without lesions, oral pharynx with moist mucus membranes, tonsils without erythema or exudates, gums normal and good dentition. Neck:  Supple, symmetrical, trachea midline, no adenopathy, thyroid symmetric, not enlarged and no tenderness, skin normal  Heart:  Normal apical impulse, regular rate and rhythm, normal S1 and S2, no S3 or S4, and no murmur noted  Lungs:  No increased work of breathing, good air exchange, clear to auscultation bilaterally, no crackles or wheezing  Abdomen:  No scars, normal bowel sounds, soft, non-distended, non-tender, no masses palpated, no hepatosplenomegally  Extremities:  No clubbing, cyanosis, or edema      DATA:  EKG: see attached    ASSESSMENT AND PLAN:    1. Patient is a 59 y.o. female with above specified procedure planned on 12/8/17 with Dr. June Singleton at [facility]. 2. Stop asa and nsaid medications 7-10 days prior to procedure. Venessa Garcia is Medically stable for surgery. Report will be faxed.

## 2017-12-05 ENCOUNTER — TELEPHONE (OUTPATIENT)
Dept: FAMILY MEDICINE CLINIC | Age: 57
End: 2017-12-05

## 2017-12-05 RX ORDER — ONDANSETRON 2 MG/ML
4 INJECTION INTRAMUSCULAR; INTRAVENOUS ONCE
Status: CANCELLED | OUTPATIENT
Start: 2017-12-05 | End: 2017-12-05

## 2017-12-05 RX ORDER — SODIUM CHLORIDE, SODIUM LACTATE, POTASSIUM CHLORIDE, CALCIUM CHLORIDE 600; 310; 30; 20 MG/100ML; MG/100ML; MG/100ML; MG/100ML
INJECTION, SOLUTION INTRAVENOUS CONTINUOUS
Status: CANCELLED | OUTPATIENT
Start: 2017-12-05

## 2017-12-05 RX ORDER — DEXAMETHASONE SODIUM PHOSPHATE 4 MG/ML
10 INJECTION, SOLUTION INTRA-ARTICULAR; INTRALESIONAL; INTRAMUSCULAR; INTRAVENOUS; SOFT TISSUE ONCE
Status: CANCELLED | OUTPATIENT
Start: 2017-12-05 | End: 2017-12-05

## 2017-12-05 RX ORDER — HEPARIN SODIUM 5000 [USP'U]/ML
5000 INJECTION, SOLUTION INTRAVENOUS; SUBCUTANEOUS ONCE
Status: CANCELLED | OUTPATIENT
Start: 2017-12-05 | End: 2017-12-05

## 2017-12-05 RX ORDER — APREPITANT 40 MG/1
40 CAPSULE ORAL ONCE
Status: CANCELLED | OUTPATIENT
Start: 2017-12-05 | End: 2017-12-05

## 2017-12-05 RX ORDER — METOCLOPRAMIDE HYDROCHLORIDE 5 MG/ML
10 INJECTION INTRAMUSCULAR; INTRAVENOUS ONCE
Status: CANCELLED | OUTPATIENT
Start: 2017-12-05 | End: 2017-12-05

## 2017-12-06 ENCOUNTER — HOSPITAL ENCOUNTER (OUTPATIENT)
Dept: PREADMISSION TESTING | Age: 57
Discharge: HOME | End: 2017-12-06
Attending: SURGERY | Admitting: SURGERY

## 2017-12-06 VITALS
HEIGHT: 68 IN | DIASTOLIC BLOOD PRESSURE: 82 MMHG | RESPIRATION RATE: 18 BRPM | HEART RATE: 88 BPM | WEIGHT: 293 LBS | OXYGEN SATURATION: 92 % | BODY MASS INDEX: 44.41 KG/M2 | TEMPERATURE: 97.4 F | SYSTOLIC BLOOD PRESSURE: 123 MMHG

## 2017-12-06 LAB
A/G RATIO: 1.3 (ref 1.1–2.2)
ABO/RH: NORMAL
ALBUMIN SERPL-MCNC: 4.3 G/DL (ref 3.4–5)
ALP BLD-CCNC: 103 U/L (ref 40–129)
ALT SERPL-CCNC: 21 U/L (ref 10–40)
ANION GAP SERPL CALCULATED.3IONS-SCNC: 13 MMOL/L (ref 3–16)
ANTIBODY SCREEN: NORMAL
AST SERPL-CCNC: 15 U/L (ref 15–37)
BILIRUB SERPL-MCNC: 0.4 MG/DL (ref 0–1)
BUN BLDV-MCNC: 15 MG/DL (ref 7–20)
CALCIUM SERPL-MCNC: 9.2 MG/DL (ref 8.3–10.6)
CHLORIDE BLD-SCNC: 101 MMOL/L (ref 99–110)
CO2: 27 MMOL/L (ref 21–32)
CREAT SERPL-MCNC: 0.7 MG/DL (ref 0.6–1.1)
GFR AFRICAN AMERICAN: >60
GFR NON-AFRICAN AMERICAN: >60
GLOBULIN: 3.3 G/DL
GLUCOSE BLD-MCNC: 96 MG/DL (ref 70–99)
HCT VFR BLD CALC: 45.4 % (ref 36–48)
HEMOGLOBIN: 15.6 G/DL (ref 12–16)
MCH RBC QN AUTO: 29.4 PG (ref 26–34)
MCHC RBC AUTO-ENTMCNC: 34.4 G/DL (ref 31–36)
MCV RBC AUTO: 85.4 FL (ref 80–100)
PDW BLD-RTO: 14 % (ref 12.4–15.4)
PLATELET # BLD: 311 K/UL (ref 135–450)
PMV BLD AUTO: 6.9 FL (ref 5–10.5)
POTASSIUM SERPL-SCNC: 4.1 MMOL/L (ref 3.5–5.1)
RBC # BLD: 5.31 M/UL (ref 4–5.2)
SODIUM BLD-SCNC: 141 MMOL/L (ref 136–145)
TOTAL PROTEIN: 7.6 G/DL (ref 6.4–8.2)
WBC # BLD: 7.3 K/UL (ref 4–11)

## 2017-12-06 NOTE — PROGRESS NOTES
The following educational items and goals will be achieved upon completion of the patient's Pre-admission testing experience:             Identify the learner who is being assessed for education:  patient                    Ability to Learn:  Exhibits ability to grasp concepts and respond to questions: High  Ready to Learn: Yes  calm   Preferred Method of Learning:  written  Barriers to Learning: Verbalizes interest  Special Considerations due to cultural, Caodaism, spiritual beliefs:  No  Language:  English  :  Lisa Chen  [x] Appropriate evaluation / integration of data as delineated by ASPAN Standards of Perianesthesia Nursing Practice    Pain scale and pain management   [x]Patient verbalizes understanding of pain scale and pain management  [x]Pre-operative determination of patients anticipated Post-Operative pain goal:   less than 4 of 10 on 10 point scale post op goal  [] Other     Medication(s) - Compliance with preop medication instructions  [x] Patient verbalizes understanding of preop medications (see [facility] Presurgical Instructions)    Instructions, Pre op                                                                                            [x] Patient verbalizes understanding of presurgical instructions as reviewed with phone interview nurse or in-person nurse review    Fall Risk Potential, Preoperatively                                                                                   []No preoperative risk identified  []Preop risk identified:                    []Sensory deficit        []Motor deficit        []Balance problem        []Home medication        []Uses assistive device                    []History of a Fall within the last 30 days    Goal(s) for fall prevention:  []Prevent fall or injury by requesting assistance with activities of daily living  []Patient / Significant other verbalizes understanding the need to call for assistance prior to getting out of bed during hospitalization      Infection Precautions                                                                                            [x] Patient understands implementation of Surgical Site Infection precautions (see [facility] Presurgical Instructions)     Patient Safety  [x] Patient identification verified  [x] Site verified    Instructions - Discharge Planning for Outpatients  [x] Patient / significant other voices understanding of home care and follow up procedures  [x] Encourage patient / significant other to review discharge instructions the day after procedure due to sedation on day of surgery    Anticipated Special Needs upon discharge:        [] Cooling device        [] Crutches       [] Vicie Poet        [] Wound Support device        [] Drain        [] Other       Instructions - Discharge Planning for Admitted patients  [x] Patient / significant other understands plan for admission after surgery  [] Patient / significant other understands plan for anticipated discharge dispostion        12/6/2017 1:53 PM Mendoza Arrington

## 2017-12-06 NOTE — PROGRESS NOTES
[address]                          Date of Procedure 12/8/17 Time of Procedure 7:30 a.m. PRIOR TO PROCEDURE DATE:  1. Please follow any guidelines/instructions prior to your procedure as advised by your surgeon. 2. Arrange for someone to drive you home and be with you for the first 24 hours after discharge for your safety after your procedure for which you received sedation. Ensure it is someone we can share information with regarding your discharge. 3. You must contact your surgeon for instructions IF:   You are taking any blood thinners, aspirin, anti-inflammatory or vitamin E.   There is a change in your physical condition such as a cold, fever, rash, cuts, sores or any other infection, especially near your surgical site. 4. Do not drink alcohol the day before or day of your procedure. 5. A Pre-op History and Physical for surgery MUST be completed by your Physician or Urgent Care within 30 days of your procedure date. Please bring a copy with you on the day of your procedure and along with any other testing performed. THE DAY OF YOUR PROCEDURE:  1. Follow instructions for ARRIVAL TIME as DIRECTED BY YOUR SURGEON. If your surgeon does not give you a specific arrival time, please arrive at 5:30 a.m.    2. Enter the MAIN entrance from [facility] and follow the signs to the free [employer] or [employer] parking (offered free of charge 6am-5pm). 3. Enter the Main Entrance of the hospital (do NOT enter from the lower level of the parking garage). Upon entrance, check in with the  at the main desk on your left. If no one is available at the desk, proceed into the [facility] Waiting Room and go through the door directly into the [facility]. There is a Check-in desk ACROSS from Room 5 (marked with a sign hanging from the ceiling).  The phone number for the surgery center is [phone].    4. DO NOT EAT OR DRINK ANYTHING AFTER MIDNIGHT (exception would be medication instructions below only)     5. MEDICATIONS    Take the following medications with a SMALL sip of water: Take Losartan, Nifedpine, Bupropion. May take Clonazepam.   Use your usual dose of inhalers the morning of surgery. BRING your rescue inhaler with you to hospital.    Anesthesia does NOT want you to take insulin the morning of surgery. They will control your blood sugar while you are at the hospital. Please contact your ordering physician for instructions regarding your insulin the night before your procedure. If you have an insulin pump, please keep it set on basal rate. 6. Do not swallow water when brushing teeth. No gum, candy, mints or ice chips. Refrain from smoking or at least decrease the amount. 7. Dress in loose, comfortable clothing appropriate for redressing after your procedure. Do not wear jewelry (including body piercings), make-up (especially NO eye make-up), fingernail polish (NO toenail polish if foot/leg surgery), lotion, powders or metal hairclips. 8. Dentures, glasses, or contacts will need to be removed before your procedure. Bring cases for your glasses, contacts, dentures, or hearing aids to protect them while you are in surgery. 9. If you use a CPAP, please bring it with you on the day of your procedure. 10. We recommend that valuable personal  belongings, such as credit cards, cash, cell phones, e-tablets or jewelry, be left at home during your stay. The hospital will not be responsible for valuables that are not secured in the hospital safe. However, if your insurance requires a co-pay, you may want to bring a method of payment, i.e. Check or credit card, if you wish to pay your co-pay the day of surgery. 11. If you are to stay overnight, you may bring a bag with personal items. Please have any large items you may need brought in by your family after your arrival to your hospital room.     12. If you have a Living Will or Durable Power of , please bring a copy on the day of your procedure. 15.  With your permission, one family member may accompany you while you are being prepared for surgery. Once you are ready, additional family members may join you. HOW WE KEEP YOU SAFE and WORK TO PREVENT SURGICAL SITE INFECTIONS:  1. Health care workers should always check your ID bracelet to verify your name and birth date. You will be asked many times to state your name, date of birth, and allergies. 2. Health care workers should always clean their hands with soap or alcohol gel before providing care to you. It is okay to ask anyone if they cleaned their hands before they touch you. 3. You will be actively involved in verifying the type of procedure you are having and ensuring the correct surgical site. This will be confirmed multiple times prior to your procedure. Do NOT eduardo your surgery site UNLESS instructed to by your surgeon. 4. Do not shave or wax for 72 hours prior to procedure near your operative site. Shaving with a razor can irritate your skin and make it easier to develop an infection. On the day of your procedure, any hair that needs to be removed near the surgical site will be clipped by a healthcare worker using a special clippers designed to avoid skin irritation. 5. When you are in the operating room, your surgical site will be cleansed with a special soap, and in most cases, you will be given an antibiotic before the surgery begins. AFTER YOUR PROCEDURE:  1. For comfort and safety, arrange to have someone at home with you for the first 24 hours after discharge. 2. You and your family will be given written instructions about your diet, activity, dressing care, medications, and return visits. 3. Always clean your hands before and after caring for your wound. Do not let your family touch your surgery site without cleaning their hands.    4. Mild nausea, headache, muscle aches, sore throat, or fatigue may occur after anesthesia. Should any of these symptoms become severe, or should you notice any signs of infection, you should call your surgeon. 5. Narcotic pain medications can cause significant constipation. You may want to add a stool softener to your postoperative medication schedule or speak to your surgeon on how best to manage this SIDE EFFECT. SPECIAL INSTRUCTIONS     Thank you for allowing us to care for you. We strive to exceed your expectations in the overall delivery of care and service provided to you and your family. If you need to contact us for any reason, please call us at [phone]    Instructions reviewed and copy given to patient during preadmission testing visit. Mishel Evans. 12/6/2017 .1:49 PM      ADDITIONAL EDUCATIONAL INFORMATION REVIEWED / PROVIDED TO YOU AND YOUR FAMILY:  Yes Taking Control of Your Pain   Yes FAQs about Surgical Site Infections  Yes Incentive Spirometer given to patient- PLEASE BRING THIS SPIROMETER BACK WITH YOU ON THE DAY OF YOUR SURGERY

## 2017-12-08 PROBLEM — Z98.84 S/P LAPAROSCOPIC SLEEVE GASTRECTOMY: Status: ACTIVE | Noted: 2017-12-08

## 2017-12-19 RX ORDER — NIFEDIPINE 60 MG/1
TABLET, EXTENDED RELEASE ORAL
Qty: 90 TABLET | Refills: 0 | Status: SHIPPED | OUTPATIENT
Start: 2017-12-19 | End: 2018-03-26 | Stop reason: SDUPTHER

## 2018-03-08 ENCOUNTER — TELEPHONE (OUTPATIENT)
Dept: FAMILY MEDICINE CLINIC | Age: 58
End: 2018-03-08

## 2018-03-08 DIAGNOSIS — E78.00 PURE HYPERCHOLESTEROLEMIA: ICD-10-CM

## 2018-03-08 DIAGNOSIS — I10 ESSENTIAL HYPERTENSION: Primary | ICD-10-CM

## 2018-03-08 NOTE — TELEPHONE ENCOUNTER
Patient has a lab appointment scheduled here on 03-05-18 for fbw. Office visit scheduled on 03-19-18. Orders please.

## 2018-03-15 ENCOUNTER — NURSE ONLY (OUTPATIENT)
Dept: FAMILY MEDICINE CLINIC | Age: 58
End: 2018-03-15

## 2018-03-15 DIAGNOSIS — E78.00 PURE HYPERCHOLESTEROLEMIA: ICD-10-CM

## 2018-03-15 DIAGNOSIS — I10 ESSENTIAL HYPERTENSION: ICD-10-CM

## 2018-03-15 LAB
ALBUMIN SERPL-MCNC: 4.1 G/DL (ref 3.4–5)
ALP BLD-CCNC: 94 U/L (ref 40–129)
ALT SERPL-CCNC: 24 U/L (ref 10–40)
ANION GAP SERPL CALCULATED.3IONS-SCNC: 14 MMOL/L (ref 3–16)
AST SERPL-CCNC: 18 U/L (ref 15–37)
BILIRUB SERPL-MCNC: 0.4 MG/DL (ref 0–1)
BILIRUBIN DIRECT: <0.2 MG/DL (ref 0–0.3)
BILIRUBIN, INDIRECT: ABNORMAL MG/DL (ref 0–1)
BUN BLDV-MCNC: 7 MG/DL (ref 7–20)
CALCIUM SERPL-MCNC: 9 MG/DL (ref 8.3–10.6)
CHLORIDE BLD-SCNC: 103 MMOL/L (ref 99–110)
CHOLESTEROL, TOTAL: 123 MG/DL (ref 0–199)
CO2: 26 MMOL/L (ref 21–32)
CREAT SERPL-MCNC: 0.6 MG/DL (ref 0.6–1.1)
GFR AFRICAN AMERICAN: >60
GFR NON-AFRICAN AMERICAN: >60
GLUCOSE BLD-MCNC: 103 MG/DL (ref 70–99)
HDLC SERPL-MCNC: 40 MG/DL (ref 40–60)
LDL CHOLESTEROL CALCULATED: 52 MG/DL
PHOSPHORUS: 4 MG/DL (ref 2.5–4.9)
POTASSIUM SERPL-SCNC: 4.1 MMOL/L (ref 3.5–5.1)
SODIUM BLD-SCNC: 143 MMOL/L (ref 136–145)
TOTAL PROTEIN: 6.3 G/DL (ref 6.4–8.2)
TRIGL SERPL-MCNC: 157 MG/DL (ref 0–150)
VLDLC SERPL CALC-MCNC: 31 MG/DL

## 2018-03-15 PROCEDURE — 36415 COLL VENOUS BLD VENIPUNCTURE: CPT | Performed by: INTERNAL MEDICINE

## 2018-03-19 ENCOUNTER — OFFICE VISIT (OUTPATIENT)
Dept: FAMILY MEDICINE CLINIC | Age: 58
End: 2018-03-19

## 2018-03-19 ENCOUNTER — TELEPHONE (OUTPATIENT)
Dept: FAMILY MEDICINE CLINIC | Age: 58
End: 2018-03-19

## 2018-03-19 VITALS
DIASTOLIC BLOOD PRESSURE: 78 MMHG | WEIGHT: 282.6 LBS | SYSTOLIC BLOOD PRESSURE: 122 MMHG | HEART RATE: 90 BPM | OXYGEN SATURATION: 95 % | RESPIRATION RATE: 18 BRPM | BODY MASS INDEX: 42.83 KG/M2 | HEIGHT: 68 IN

## 2018-03-19 DIAGNOSIS — E66.01 MORBID OBESITY DUE TO EXCESS CALORIES (HCC): ICD-10-CM

## 2018-03-19 DIAGNOSIS — E78.00 PURE HYPERCHOLESTEROLEMIA: ICD-10-CM

## 2018-03-19 DIAGNOSIS — G47.33 OBSTRUCTIVE SLEEP APNEA SYNDROME: ICD-10-CM

## 2018-03-19 DIAGNOSIS — I10 ESSENTIAL HYPERTENSION: Primary | ICD-10-CM

## 2018-03-19 PROCEDURE — 3017F COLORECTAL CA SCREEN DOC REV: CPT | Performed by: INTERNAL MEDICINE

## 2018-03-19 PROCEDURE — 3014F SCREEN MAMMO DOC REV: CPT | Performed by: INTERNAL MEDICINE

## 2018-03-19 PROCEDURE — G8482 FLU IMMUNIZE ORDER/ADMIN: HCPCS | Performed by: INTERNAL MEDICINE

## 2018-03-19 PROCEDURE — 1036F TOBACCO NON-USER: CPT | Performed by: INTERNAL MEDICINE

## 2018-03-19 PROCEDURE — G8427 DOCREV CUR MEDS BY ELIG CLIN: HCPCS | Performed by: INTERNAL MEDICINE

## 2018-03-19 PROCEDURE — G8417 CALC BMI ABV UP PARAM F/U: HCPCS | Performed by: INTERNAL MEDICINE

## 2018-03-19 PROCEDURE — 99214 OFFICE O/P EST MOD 30 MIN: CPT | Performed by: INTERNAL MEDICINE

## 2018-03-19 RX ORDER — PHENTERMINE HYDROCHLORIDE 37.5 MG/1
1 TABLET ORAL DAILY
Refills: 0 | COMMUNITY
Start: 2018-02-06 | End: 2019-02-18 | Stop reason: ALTCHOICE

## 2018-03-19 ASSESSMENT — ENCOUNTER SYMPTOMS
RESPIRATORY NEGATIVE: 1
GASTROINTESTINAL NEGATIVE: 1
ALLERGIC/IMMUNOLOGIC NEGATIVE: 1

## 2018-03-19 NOTE — ASSESSMENT & PLAN NOTE
No change in meds, no c/o with meds, no chest pain, SOB, palpatations, or syncope. Home bp not taken.

## 2018-03-19 NOTE — PROGRESS NOTES
Subjective:      Patient ID: Facundo Barnard is a 59 y.o. female. HPI  Morbid obesity due to excess calories ([address]  Lost another 20 lbs!!!! Much improved. s/p Gastric surgery 12/8/2017. Essential hypertension  No change in meds, no c/o with meds, no chest pain, SOB, palpatations, or syncope. Home bp not taken. Pure hypercholesterolemia  Improved diet and continued wt loss. No c/o w/ meds. Obstructive sleep apnea syndrome  Has lost a lot of wt s/p surgery. Past Medical History:   Diagnosis Date    Anxiety     with panic attacks    Arthritis     knees    Chronic back pain     Dental crowns present     upper left dental implant    Depression     Hyperlipidemia     borderline    Hypertension     Hypotension     Her Father after anesthesia    IBS (irritable bowel syndrome)     with rectal bleeding    Knee osteoarthritis 1/25/2013    Morbid obesity ([address]     Positive H. pylori test 12/10/12    Prolonged emergence from general anesthesia     Unspecified sleep apnea     sleep study not done     Current Outpatient Prescriptions   Medication Sig Dispense Refill    phentermine (ADIPEX-P) 37.5 MG tablet Take 1 tablet by mouth daily. 0    NIFEdipine (PROCARDIA XL) 60 MG extended release tablet TAKE 1 TABLET BY MOUTH EVERY DAY 90 tablet 0    losartan (COZAAR) 50 MG tablet Take 0.5 tablets by mouth daily 90 tablet 3    CLONAZEPAM PO Take 1 mg by mouth daily as needed      buPROPion (WELLBUTRIN XL) 300 MG extended release tablet Take 300 mg by mouth every morning      citalopram (CELEXA) 40 MG tablet Take 40 mg by mouth nightly       atorvastatin (LIPITOR) 20 MG tablet TAKE 1 TABLET BY MOUTH DAILY 90 tablet 3    traZODone (DESYREL) 50 MG tablet Take 100 mg by mouth nightly       clonazepam (KLONOPIN) 2 MG tablet Take 2 mg by mouth nightly        No current facility-administered medications for this visit.       No Known Allergies  Social History   Substance Use Topics    Smoking status: Pure Hypercholesterolemia. Good w/ meds and improved diet. Plan:      All above problems reviewed and the found to be unchanged except for the following : Morbid Obesity Due to Excess Calories (Hcc). Continue to lose weight and to increase exercise. Obstructive Sleep Apnea Syndrome. To continue to lose weight . call If new c/o. Essential Hypertension. Continue present meds. Home BP checks. Call if>140/90. Improve diet. Avoid caffeine and salt. Call if new c/o w/ meds. Pure Hypercholesterolemia. To continue to improve diet and lose weight.

## 2018-03-19 NOTE — PATIENT INSTRUCTIONS
All above problems reviewed and the found to be unchanged except for the following : Morbid Obesity Due to Excess Calories (Hcc). Continue to lose weight and to increase exercise. Obstructive Sleep Apnea Syndrome. To continue to lose weight . call If new c/o. Essential Hypertension. Continue present meds. Home BP checks. Call if>140/90. Improve diet. Avoid caffeine and salt. Call if new c/o w/ meds. Pure Hypercholesterolemia. To continue to improve diet and lose weight.

## 2018-03-20 ENCOUNTER — TELEPHONE (OUTPATIENT)
Dept: FAMILY MEDICINE CLINIC | Age: 58
End: 2018-03-20

## 2018-03-20 DIAGNOSIS — Z11.4 ENCOUNTER FOR SCREENING FOR HIV: Primary | ICD-10-CM

## 2018-03-20 DIAGNOSIS — Z11.59 NEED FOR HEPATITIS C SCREENING TEST: ICD-10-CM

## 2018-03-26 RX ORDER — NIFEDIPINE 60 MG/1
TABLET, EXTENDED RELEASE ORAL
Qty: 90 TABLET | Refills: 1 | Status: SHIPPED | OUTPATIENT
Start: 2018-03-26 | End: 2019-08-19 | Stop reason: SDUPTHER

## 2018-04-23 ENCOUNTER — OFFICE VISIT (OUTPATIENT)
Dept: FAMILY MEDICINE CLINIC | Age: 58
End: 2018-04-23

## 2018-04-23 VITALS
SYSTOLIC BLOOD PRESSURE: 126 MMHG | HEIGHT: 68 IN | DIASTOLIC BLOOD PRESSURE: 86 MMHG | HEART RATE: 91 BPM | OXYGEN SATURATION: 98 %

## 2018-04-23 DIAGNOSIS — Z11.4 ENCOUNTER FOR SCREENING FOR HIV: ICD-10-CM

## 2018-04-23 DIAGNOSIS — H69.82 DYSFUNCTION OF LEFT EUSTACHIAN TUBE: Primary | ICD-10-CM

## 2018-04-23 DIAGNOSIS — Z11.59 NEED FOR HEPATITIS C SCREENING TEST: ICD-10-CM

## 2018-04-23 PROCEDURE — G8427 DOCREV CUR MEDS BY ELIG CLIN: HCPCS | Performed by: PHYSICIAN ASSISTANT

## 2018-04-23 PROCEDURE — 1036F TOBACCO NON-USER: CPT | Performed by: PHYSICIAN ASSISTANT

## 2018-04-23 PROCEDURE — 99213 OFFICE O/P EST LOW 20 MIN: CPT | Performed by: PHYSICIAN ASSISTANT

## 2018-04-23 PROCEDURE — 3017F COLORECTAL CA SCREEN DOC REV: CPT | Performed by: PHYSICIAN ASSISTANT

## 2018-04-23 PROCEDURE — G8417 CALC BMI ABV UP PARAM F/U: HCPCS | Performed by: PHYSICIAN ASSISTANT

## 2018-04-23 ASSESSMENT — ENCOUNTER SYMPTOMS: RESPIRATORY NEGATIVE: 1

## 2018-04-24 LAB
HEPATITIS C ANTIBODY INTERPRETATION: NORMAL
HIV AG/AB: NORMAL
HIV ANTIGEN: NORMAL
HIV-1 ANTIBODY: NORMAL
HIV-2 AB: NORMAL

## 2018-04-27 ENCOUNTER — TELEPHONE (OUTPATIENT)
Dept: FAMILY MEDICINE CLINIC | Age: 58
End: 2018-04-27

## 2018-08-07 ENCOUNTER — OFFICE VISIT (OUTPATIENT)
Dept: ORTHOPEDIC SURGERY | Age: 58
End: 2018-08-07

## 2018-08-07 DIAGNOSIS — M25.562 ACUTE PAIN OF LEFT KNEE: Primary | ICD-10-CM

## 2018-08-07 DIAGNOSIS — M17.12 PRIMARY OSTEOARTHRITIS OF LEFT KNEE: ICD-10-CM

## 2018-08-07 PROCEDURE — 20610 DRAIN/INJ JOINT/BURSA W/O US: CPT | Performed by: ORTHOPAEDIC SURGERY

## 2018-08-07 NOTE — PROGRESS NOTES
Recommendation is for a cortisone injection into the left knee. After informed consent was received from the patient, the left knee was injected with 1 mL( 40mg) Depo-Medrol and 4 mL  of 0.25% Marcaine in the syringe from an anterolateral joint line approach, using a 25-gauge needle, under sterile Betadine prep, using ethyl chloride as a topical refrigerant, for a diagnosis of osteoarthritis. The patient appeared to tolerate it well. The patient should return here periodically as needed.

## 2018-08-08 ENCOUNTER — TELEPHONE (OUTPATIENT)
Dept: ORTHOPEDIC SURGERY | Age: 58
End: 2018-08-08

## 2018-08-21 ENCOUNTER — OFFICE VISIT (OUTPATIENT)
Dept: ORTHOPEDIC SURGERY | Age: 58
End: 2018-08-21

## 2018-08-21 DIAGNOSIS — M17.12 PRIMARY OSTEOARTHRITIS OF LEFT KNEE: Primary | ICD-10-CM

## 2018-08-21 PROCEDURE — 20610 DRAIN/INJ JOINT/BURSA W/O US: CPT | Performed by: ORTHOPAEDIC SURGERY

## 2018-08-28 ENCOUNTER — OFFICE VISIT (OUTPATIENT)
Dept: ORTHOPEDIC SURGERY | Age: 58
End: 2018-08-28

## 2018-08-28 DIAGNOSIS — M79.672 LEFT FOOT PAIN: ICD-10-CM

## 2018-08-28 DIAGNOSIS — M19.072 PRIMARY OSTEOARTHRITIS OF LEFT FOOT: ICD-10-CM

## 2018-08-28 DIAGNOSIS — M17.12 PRIMARY OSTEOARTHRITIS OF LEFT KNEE: Primary | ICD-10-CM

## 2018-08-28 PROCEDURE — 3017F COLORECTAL CA SCREEN DOC REV: CPT | Performed by: ORTHOPAEDIC SURGERY

## 2018-08-28 PROCEDURE — G8417 CALC BMI ABV UP PARAM F/U: HCPCS | Performed by: ORTHOPAEDIC SURGERY

## 2018-08-28 PROCEDURE — 20610 DRAIN/INJ JOINT/BURSA W/O US: CPT | Performed by: ORTHOPAEDIC SURGERY

## 2018-08-28 PROCEDURE — 99213 OFFICE O/P EST LOW 20 MIN: CPT | Performed by: ORTHOPAEDIC SURGERY

## 2018-08-28 PROCEDURE — G8427 DOCREV CUR MEDS BY ELIG CLIN: HCPCS | Performed by: ORTHOPAEDIC SURGERY

## 2018-08-28 PROCEDURE — 1036F TOBACCO NON-USER: CPT | Performed by: ORTHOPAEDIC SURGERY

## 2018-08-28 NOTE — PROGRESS NOTES
tendon reflexes are intact  Mental Status: The patient is oriented to time, place and person. The patient's mood and affect are appropriate. Lymphatic: The lymphatic examination bilaterally reveals all areas to be without enlargement or induration. Vascular: Examination reveals no swelling or calf tenderness. Peripheral pulses are palpable and 2+. Neurological: The patient has good coordination. There is no weakness or sensory deficit. Left Foot Examination:    Inspection:  She has some mild swelling over the midfoot    Palpation:  She has tenderness over the mid foot tarsus    Range of Motion:  Normal symmetric with pain with rotational motion    Strength:  Normal symmetric to both lower extremities    Special Tests:      Skin: There are no rashes, ulcerations or lesions. Gait: Slightly antalgic    Reflex 2+ and symmetric    Additional Comments:       Additional Examinations:         Right Lower Extremity: Examination of the right lower extremity does not show any tenderness, deformity or injury. Range of motion is unremarkable. There is no gross instability. There are no rashes, ulcerations or lesions. Strength and tone are normal.    Radiology:     X-rays 2 views of her left foot reveals tarsonavicular osteoarthritis          Assessment :  Left foot osteoarthritis    Impression:  Encounter Diagnoses   Name Primary?  Primary osteoarthritis of left knee Yes    Left foot pain     Primary osteoarthritis of left foot        Office Procedures:  Orders Placed This Encounter   Procedures    XR FOOT LEFT (2 VIEWS)    EUFLEXXA INJ PER DOSE    UT ARTHROCENTESIS ASPIR&/INJ MAJOR JT/BURSA W/O US       Treatment Plan: For her foot. I'll give her a topical anti-inflammatory and an oral anti-inflammatory.   I would also recommend stiff soled shoe wear and possibly an over-the-counter orthotic

## 2018-09-04 ENCOUNTER — OFFICE VISIT (OUTPATIENT)
Dept: ORTHOPEDIC SURGERY | Age: 58
End: 2018-09-04

## 2018-09-04 VITALS — WEIGHT: 275 LBS | BODY MASS INDEX: 41.68 KG/M2 | HEIGHT: 68 IN

## 2018-09-04 DIAGNOSIS — M17.12 PRIMARY OSTEOARTHRITIS OF LEFT KNEE: Primary | ICD-10-CM

## 2018-09-04 PROCEDURE — 20610 DRAIN/INJ JOINT/BURSA W/O US: CPT | Performed by: ORTHOPAEDIC SURGERY

## 2018-09-04 NOTE — PROGRESS NOTES
HISTORY OF PRESENT ILLNESS: Patient returns today for their third out of a series of three Euflexxa injections done to the left knee that was performed under a sterile Betadine prep, using ethyl chloride as topical refrigerant, for a diagnosis of osteoarthritis. The injection of 2 ml of Euflexxa was done from an anterolateral joint line approach using a 25-gauge needle. It was done without incident and was tolerated well. PLAN: The patient should return in two months for followup if needed.

## 2018-11-29 RX ORDER — ATORVASTATIN CALCIUM 20 MG/1
TABLET, FILM COATED ORAL
Qty: 90 TABLET | Refills: 0 | Status: SHIPPED | OUTPATIENT
Start: 2018-11-29 | End: 2019-02-18 | Stop reason: SDUPTHER

## 2018-11-29 RX ORDER — LOSARTAN POTASSIUM 50 MG/1
50 TABLET ORAL DAILY
Qty: 90 TABLET | Refills: 0 | Status: SHIPPED | OUTPATIENT
Start: 2018-11-29 | End: 2019-02-18 | Stop reason: SDUPTHER

## 2019-02-18 ENCOUNTER — TELEPHONE (OUTPATIENT)
Dept: FAMILY MEDICINE CLINIC | Age: 59
End: 2019-02-18

## 2019-02-18 ENCOUNTER — OFFICE VISIT (OUTPATIENT)
Dept: FAMILY MEDICINE CLINIC | Age: 59
End: 2019-02-18
Payer: COMMERCIAL

## 2019-02-18 VITALS
SYSTOLIC BLOOD PRESSURE: 138 MMHG | RESPIRATION RATE: 18 BRPM | DIASTOLIC BLOOD PRESSURE: 82 MMHG | HEART RATE: 87 BPM | OXYGEN SATURATION: 98 %

## 2019-02-18 DIAGNOSIS — I10 ESSENTIAL HYPERTENSION: Primary | ICD-10-CM

## 2019-02-18 DIAGNOSIS — F41.9 ANXIETY: ICD-10-CM

## 2019-02-18 DIAGNOSIS — G47.33 OBSTRUCTIVE SLEEP APNEA SYNDROME: ICD-10-CM

## 2019-02-18 DIAGNOSIS — E66.01 MORBID OBESITY DUE TO EXCESS CALORIES (HCC): ICD-10-CM

## 2019-02-18 DIAGNOSIS — Z23 FLU VACCINE NEED: ICD-10-CM

## 2019-02-18 DIAGNOSIS — E78.00 PURE HYPERCHOLESTEROLEMIA: ICD-10-CM

## 2019-02-18 DIAGNOSIS — R10.30 LOWER ABDOMINAL PAIN: ICD-10-CM

## 2019-02-18 DIAGNOSIS — R53.83 FATIGUE, UNSPECIFIED TYPE: ICD-10-CM

## 2019-02-18 DIAGNOSIS — R10.84 GENERALIZED ABDOMINAL PAIN: ICD-10-CM

## 2019-02-18 LAB
ALBUMIN SERPL-MCNC: 4.3 G/DL (ref 3.4–5)
ALP BLD-CCNC: 100 U/L (ref 40–129)
ALT SERPL-CCNC: 13 U/L (ref 10–40)
AMYLASE: 40 U/L (ref 25–115)
ANION GAP SERPL CALCULATED.3IONS-SCNC: 15 MMOL/L (ref 3–16)
AST SERPL-CCNC: 16 U/L (ref 15–37)
BASOPHILS ABSOLUTE: 0.1 K/UL (ref 0–0.2)
BASOPHILS RELATIVE PERCENT: 0.9 %
BILIRUB SERPL-MCNC: 0.4 MG/DL (ref 0–1)
BILIRUBIN DIRECT: <0.2 MG/DL (ref 0–0.3)
BILIRUBIN, INDIRECT: NORMAL MG/DL (ref 0–1)
BUN BLDV-MCNC: 11 MG/DL (ref 7–20)
C-REACTIVE PROTEIN: 1.3 MG/L (ref 0–5.1)
CALCIUM SERPL-MCNC: 9.5 MG/DL (ref 8.3–10.6)
CHLORIDE BLD-SCNC: 103 MMOL/L (ref 99–110)
CHOLESTEROL, TOTAL: 140 MG/DL (ref 0–199)
CO2: 26 MMOL/L (ref 21–32)
CREAT SERPL-MCNC: 0.6 MG/DL (ref 0.6–1.2)
EOSINOPHILS ABSOLUTE: 0 K/UL (ref 0–0.6)
EOSINOPHILS RELATIVE PERCENT: 0.7 %
GFR AFRICAN AMERICAN: >60
GFR NON-AFRICAN AMERICAN: >60
GLUCOSE BLD-MCNC: 99 MG/DL (ref 70–99)
HCT VFR BLD CALC: 47.7 % (ref 36–48)
HDLC SERPL-MCNC: 52 MG/DL (ref 40–60)
HEMOGLOBIN: 15.6 G/DL (ref 12–16)
LDL CHOLESTEROL CALCULATED: 64 MG/DL
LIPASE: 29 U/L (ref 13–60)
LYMPHOCYTES ABSOLUTE: 1.5 K/UL (ref 1–5.1)
LYMPHOCYTES RELATIVE PERCENT: 21.4 %
MCH RBC QN AUTO: 28.9 PG (ref 26–34)
MCHC RBC AUTO-ENTMCNC: 32.7 G/DL (ref 31–36)
MCV RBC AUTO: 88.4 FL (ref 80–100)
MONOCYTES ABSOLUTE: 0.4 K/UL (ref 0–1.3)
MONOCYTES RELATIVE PERCENT: 5.9 %
NEUTROPHILS ABSOLUTE: 4.9 K/UL (ref 1.7–7.7)
NEUTROPHILS RELATIVE PERCENT: 71.1 %
PDW BLD-RTO: 13.8 % (ref 12.4–15.4)
PLATELET # BLD: 325 K/UL (ref 135–450)
PMV BLD AUTO: 7.8 FL (ref 5–10.5)
POTASSIUM SERPL-SCNC: 4.3 MMOL/L (ref 3.5–5.1)
RBC # BLD: 5.39 M/UL (ref 4–5.2)
SEDIMENTATION RATE, ERYTHROCYTE: 20 MM/HR (ref 0–30)
SODIUM BLD-SCNC: 144 MMOL/L (ref 136–145)
TOTAL PROTEIN: 7.1 G/DL (ref 6.4–8.2)
TRIGL SERPL-MCNC: 121 MG/DL (ref 0–150)
TSH SERPL DL<=0.05 MIU/L-ACNC: 1.27 UIU/ML (ref 0.27–4.2)
VLDLC SERPL CALC-MCNC: 24 MG/DL
WBC # BLD: 6.9 K/UL (ref 4–11)

## 2019-02-18 PROCEDURE — 99214 OFFICE O/P EST MOD 30 MIN: CPT | Performed by: INTERNAL MEDICINE

## 2019-02-18 PROCEDURE — G8427 DOCREV CUR MEDS BY ELIG CLIN: HCPCS | Performed by: INTERNAL MEDICINE

## 2019-02-18 PROCEDURE — 90686 IIV4 VACC NO PRSV 0.5 ML IM: CPT | Performed by: INTERNAL MEDICINE

## 2019-02-18 PROCEDURE — 90471 IMMUNIZATION ADMIN: CPT | Performed by: INTERNAL MEDICINE

## 2019-02-18 PROCEDURE — 3017F COLORECTAL CA SCREEN DOC REV: CPT | Performed by: INTERNAL MEDICINE

## 2019-02-18 PROCEDURE — 36415 COLL VENOUS BLD VENIPUNCTURE: CPT | Performed by: INTERNAL MEDICINE

## 2019-02-18 PROCEDURE — 1036F TOBACCO NON-USER: CPT | Performed by: INTERNAL MEDICINE

## 2019-02-18 PROCEDURE — G8417 CALC BMI ABV UP PARAM F/U: HCPCS | Performed by: INTERNAL MEDICINE

## 2019-02-18 PROCEDURE — G8484 FLU IMMUNIZE NO ADMIN: HCPCS | Performed by: INTERNAL MEDICINE

## 2019-02-18 RX ORDER — ATORVASTATIN CALCIUM 20 MG/1
20 TABLET, FILM COATED ORAL DAILY
Qty: 90 TABLET | Refills: 1 | Status: SHIPPED | OUTPATIENT
Start: 2019-02-18 | End: 2019-09-13 | Stop reason: SDUPTHER

## 2019-02-18 RX ORDER — LOSARTAN POTASSIUM 50 MG/1
50 TABLET ORAL DAILY
Qty: 90 TABLET | Refills: 1 | Status: SHIPPED | OUTPATIENT
Start: 2019-02-18 | End: 2019-09-13 | Stop reason: SDUPTHER

## 2019-02-18 ASSESSMENT — PATIENT HEALTH QUESTIONNAIRE - PHQ9
8. MOVING OR SPEAKING SO SLOWLY THAT OTHER PEOPLE COULD HAVE NOTICED. OR THE OPPOSITE, BEING SO FIGETY OR RESTLESS THAT YOU HAVE BEEN MOVING AROUND A LOT MORE THAN USUAL: 0
SUM OF ALL RESPONSES TO PHQ QUESTIONS 1-9: 8
10. IF YOU CHECKED OFF ANY PROBLEMS, HOW DIFFICULT HAVE THESE PROBLEMS MADE IT FOR YOU TO DO YOUR WORK, TAKE CARE OF THINGS AT HOME, OR GET ALONG WITH OTHER PEOPLE: 2
9. THOUGHTS THAT YOU WOULD BE BETTER OFF DEAD, OR OF HURTING YOURSELF: 0
2. FEELING DOWN, DEPRESSED OR HOPELESS: 1
5. POOR APPETITE OR OVEREATING: 1
3. TROUBLE FALLING OR STAYING ASLEEP: 1
7. TROUBLE CONCENTRATING ON THINGS, SUCH AS READING THE NEWSPAPER OR WATCHING TELEVISION: 0
SUM OF ALL RESPONSES TO PHQ9 QUESTIONS 1 & 2: 3
4. FEELING TIRED OR HAVING LITTLE ENERGY: 3
1. LITTLE INTEREST OR PLEASURE IN DOING THINGS: 2
SUM OF ALL RESPONSES TO PHQ QUESTIONS 1-9: 8
6. FEELING BAD ABOUT YOURSELF - OR THAT YOU ARE A FAILURE OR HAVE LET YOURSELF OR YOUR FAMILY DOWN: 0

## 2019-02-18 ASSESSMENT — ENCOUNTER SYMPTOMS
RESPIRATORY NEGATIVE: 1
GASTROINTESTINAL NEGATIVE: 1
ALLERGIC/IMMUNOLOGIC NEGATIVE: 1

## 2019-02-20 RX ORDER — NIFEDIPINE 60 MG/1
TABLET, FILM COATED, EXTENDED RELEASE ORAL
Qty: 90 TABLET | Refills: 0 | Status: SHIPPED | OUTPATIENT
Start: 2019-02-20 | End: 2019-05-30 | Stop reason: SDUPTHER

## 2019-02-25 ENCOUNTER — HOSPITAL ENCOUNTER (OUTPATIENT)
Dept: CT IMAGING | Age: 59
Discharge: HOME | End: 2019-02-25
Payer: COMMERCIAL

## 2019-02-25 DIAGNOSIS — R10.2 PELVIC PAIN: ICD-10-CM

## 2019-02-25 PROCEDURE — 74177 CT ABD & PELVIS W/CONTRAST: CPT

## 2019-02-25 PROCEDURE — 6360000004 HC RX CONTRAST MEDICATION: Performed by: OBSTETRICS & GYNECOLOGY

## 2019-02-25 RX ADMIN — IOPAMIDOL 75 ML: 755 INJECTION, SOLUTION INTRAVENOUS at 12:00

## 2019-02-25 RX ADMIN — IOHEXOL 50 ML: 240 INJECTION, SOLUTION INTRATHECAL; INTRAVASCULAR; INTRAVENOUS; ORAL at 12:01

## 2019-04-17 ENCOUNTER — TELEPHONE (OUTPATIENT)
Dept: PULMONOLOGY | Age: 59
End: 2019-04-17

## 2019-04-17 NOTE — TELEPHONE ENCOUNTER
Patient cancelled appointment on 4/17/19 with Zaynab Venegas for New pt for sleep. Reason: is building a house and double booked her schedule    Patient did not reschedule appointment. She will call back to reschedule.      Last OV N/A

## 2019-04-22 NOTE — TELEPHONE ENCOUNTER
Message routed to Dr. Wood Gonzalez (referring provider) and Tiffanie Doan for [employer].

## 2019-05-30 RX ORDER — NIFEDIPINE 60 MG/1
TABLET, EXTENDED RELEASE ORAL
Qty: 90 TABLET | Refills: 0 | Status: SHIPPED | OUTPATIENT
Start: 2019-05-30 | End: 2019-09-13 | Stop reason: SDUPTHER

## 2019-08-19 ENCOUNTER — OFFICE VISIT (OUTPATIENT)
Dept: FAMILY MEDICINE CLINIC | Age: 59
End: 2019-08-19
Payer: COMMERCIAL

## 2019-08-19 VITALS
HEART RATE: 88 BPM | WEIGHT: 293 LBS | DIASTOLIC BLOOD PRESSURE: 72 MMHG | RESPIRATION RATE: 16 BRPM | BODY MASS INDEX: 44.41 KG/M2 | OXYGEN SATURATION: 97 % | HEIGHT: 68 IN | SYSTOLIC BLOOD PRESSURE: 122 MMHG

## 2019-08-19 DIAGNOSIS — K21.9 GASTROESOPHAGEAL REFLUX DISEASE WITHOUT ESOPHAGITIS: ICD-10-CM

## 2019-08-19 DIAGNOSIS — G47.33 OBSTRUCTIVE SLEEP APNEA SYNDROME: ICD-10-CM

## 2019-08-19 DIAGNOSIS — E78.00 PURE HYPERCHOLESTEROLEMIA: ICD-10-CM

## 2019-08-19 DIAGNOSIS — I10 ESSENTIAL HYPERTENSION: ICD-10-CM

## 2019-08-19 DIAGNOSIS — Z00.00 ANNUAL PHYSICAL EXAM: Primary | ICD-10-CM

## 2019-08-19 DIAGNOSIS — F41.9 ANXIETY: ICD-10-CM

## 2019-08-19 LAB
ALBUMIN SERPL-MCNC: 4.9 G/DL (ref 3.4–5)
ALP BLD-CCNC: 103 U/L (ref 40–129)
ALT SERPL-CCNC: 19 U/L (ref 10–40)
ANION GAP SERPL CALCULATED.3IONS-SCNC: 18 MMOL/L (ref 3–16)
AST SERPL-CCNC: 23 U/L (ref 15–37)
BILIRUB SERPL-MCNC: 0.6 MG/DL (ref 0–1)
BILIRUBIN DIRECT: <0.2 MG/DL (ref 0–0.3)
BILIRUBIN, INDIRECT: NORMAL MG/DL (ref 0–1)
BUN BLDV-MCNC: 10 MG/DL (ref 7–20)
CALCIUM SERPL-MCNC: 9.8 MG/DL (ref 8.3–10.6)
CHLORIDE BLD-SCNC: 102 MMOL/L (ref 99–110)
CHOLESTEROL, TOTAL: 140 MG/DL (ref 0–199)
CO2: 23 MMOL/L (ref 21–32)
CREAT SERPL-MCNC: 0.6 MG/DL (ref 0.6–1.2)
GFR AFRICAN AMERICAN: >60
GFR NON-AFRICAN AMERICAN: >60
GLUCOSE BLD-MCNC: 98 MG/DL (ref 70–99)
HCT VFR BLD CALC: 47.7 % (ref 36–48)
HDLC SERPL-MCNC: 47 MG/DL (ref 40–60)
HEMOGLOBIN: 15.8 G/DL (ref 12–16)
LDL CHOLESTEROL CALCULATED: 65 MG/DL
MCH RBC QN AUTO: 29 PG (ref 26–34)
MCHC RBC AUTO-ENTMCNC: 33 G/DL (ref 31–36)
MCV RBC AUTO: 87.7 FL (ref 80–100)
PDW BLD-RTO: 14.3 % (ref 12.4–15.4)
PHOSPHORUS: 3.5 MG/DL (ref 2.5–4.9)
PLATELET # BLD: 270 K/UL (ref 135–450)
PMV BLD AUTO: 8.4 FL (ref 5–10.5)
POTASSIUM SERPL-SCNC: 4.5 MMOL/L (ref 3.5–5.1)
RBC # BLD: 5.44 M/UL (ref 4–5.2)
SODIUM BLD-SCNC: 143 MMOL/L (ref 136–145)
TOTAL PROTEIN: 7.4 G/DL (ref 6.4–8.2)
TRIGL SERPL-MCNC: 139 MG/DL (ref 0–150)
TSH SERPL DL<=0.05 MIU/L-ACNC: 0.8 UIU/ML (ref 0.27–4.2)
VLDLC SERPL CALC-MCNC: 28 MG/DL
WBC # BLD: 7.4 K/UL (ref 4–11)

## 2019-08-19 PROCEDURE — 36415 COLL VENOUS BLD VENIPUNCTURE: CPT | Performed by: INTERNAL MEDICINE

## 2019-08-19 PROCEDURE — 99396 PREV VISIT EST AGE 40-64: CPT | Performed by: INTERNAL MEDICINE

## 2019-08-19 RX ORDER — OMEPRAZOLE 20 MG/1
20 CAPSULE, DELAYED RELEASE ORAL
Qty: 30 CAPSULE | Refills: 5 | Status: SHIPPED | OUTPATIENT
Start: 2019-08-19 | End: 2019-08-20 | Stop reason: SDUPTHER

## 2019-08-19 ASSESSMENT — ENCOUNTER SYMPTOMS
GASTROINTESTINAL NEGATIVE: 1
ALLERGIC/IMMUNOLOGIC NEGATIVE: 1
EYES NEGATIVE: 1
RESPIRATORY NEGATIVE: 1

## 2019-08-20 ENCOUNTER — TELEPHONE (OUTPATIENT)
Dept: FAMILY MEDICINE CLINIC | Age: 59
End: 2019-08-20

## 2019-08-20 RX ORDER — OMEPRAZOLE 20 MG/1
20 CAPSULE, DELAYED RELEASE ORAL
Qty: 30 CAPSULE | Refills: 5 | Status: SHIPPED | OUTPATIENT
Start: 2019-08-20 | End: 2020-05-17

## 2019-09-13 RX ORDER — LOSARTAN POTASSIUM 50 MG/1
TABLET ORAL
Qty: 90 TABLET | Refills: 0 | Status: SHIPPED | OUTPATIENT
Start: 2019-09-13 | End: 2020-03-04

## 2019-09-13 RX ORDER — NIFEDIPINE 60 MG/1
TABLET, EXTENDED RELEASE ORAL
Qty: 90 TABLET | Refills: 0 | Status: SHIPPED | OUTPATIENT
Start: 2019-09-13 | End: 2020-01-08 | Stop reason: SDUPTHER

## 2019-09-13 RX ORDER — ATORVASTATIN CALCIUM 20 MG/1
TABLET, FILM COATED ORAL
Qty: 90 TABLET | Refills: 0 | Status: SHIPPED | OUTPATIENT
Start: 2019-09-13 | End: 2020-03-04

## 2019-09-18 PROBLEM — Z00.00 ANNUAL PHYSICAL EXAM: Status: RESOLVED | Noted: 2019-08-19 | Resolved: 2019-09-18

## 2019-11-26 ENCOUNTER — OFFICE VISIT (OUTPATIENT)
Dept: PULMONOLOGY | Age: 59
End: 2019-11-26
Payer: COMMERCIAL

## 2019-11-26 VITALS
WEIGHT: 291 LBS | SYSTOLIC BLOOD PRESSURE: 130 MMHG | TEMPERATURE: 98.1 F | BODY MASS INDEX: 44.1 KG/M2 | HEIGHT: 68 IN | HEART RATE: 91 BPM | OXYGEN SATURATION: 96 % | DIASTOLIC BLOOD PRESSURE: 86 MMHG | RESPIRATION RATE: 16 BRPM

## 2019-11-26 DIAGNOSIS — R06.83 SNORING: Primary | ICD-10-CM

## 2019-11-26 DIAGNOSIS — R51.9 MORNING HEADACHE: ICD-10-CM

## 2019-11-26 DIAGNOSIS — E66.01 OBESITY, MORBID, BMI 40.0-49.9 (HCC): ICD-10-CM

## 2019-11-26 DIAGNOSIS — G47.10 HYPERSOMNIA: ICD-10-CM

## 2019-11-26 PROCEDURE — G8417 CALC BMI ABV UP PARAM F/U: HCPCS | Performed by: NURSE PRACTITIONER

## 2019-11-26 PROCEDURE — G8427 DOCREV CUR MEDS BY ELIG CLIN: HCPCS | Performed by: NURSE PRACTITIONER

## 2019-11-26 PROCEDURE — G8482 FLU IMMUNIZE ORDER/ADMIN: HCPCS | Performed by: NURSE PRACTITIONER

## 2019-11-26 PROCEDURE — 99203 OFFICE O/P NEW LOW 30 MIN: CPT | Performed by: NURSE PRACTITIONER

## 2019-11-26 ASSESSMENT — SLEEP AND FATIGUE QUESTIONNAIRES
NECK CIRCUMFERENCE (INCHES): 15
HOW LIKELY ARE YOU TO NOD OFF OR FALL ASLEEP WHILE SITTING AND TALKING TO SOMEONE: 0
HOW LIKELY ARE YOU TO NOD OFF OR FALL ASLEEP IN A CAR, WHILE STOPPED FOR A FEW MINUTES IN TRAFFIC: 0
HOW LIKELY ARE YOU TO NOD OFF OR FALL ASLEEP WHILE SITTING QUIETLY AFTER LUNCH WITHOUT ALCOHOL: 0
HOW LIKELY ARE YOU TO NOD OFF OR FALL ASLEEP WHILE SITTING AND READING: 0
HOW LIKELY ARE YOU TO NOD OFF OR FALL ASLEEP WHILE LYING DOWN TO REST IN THE AFTERNOON WHEN CIRCUMSTANCES PERMIT: 3
HOW LIKELY ARE YOU TO NOD OFF OR FALL ASLEEP WHILE SITTING INACTIVE IN A PUBLIC PLACE: 0
HOW LIKELY ARE YOU TO NOD OFF OR FALL ASLEEP WHILE WATCHING TV: 0
ESS TOTAL SCORE: 3
HOW LIKELY ARE YOU TO NOD OFF OR FALL ASLEEP WHEN YOU ARE A PASSENGER IN A CAR FOR AN HOUR WITHOUT A BREAK: 0

## 2019-12-13 ENCOUNTER — HOSPITAL ENCOUNTER (OUTPATIENT)
Dept: SLEEP CENTER | Age: 59
Discharge: HOME | End: 2019-12-15
Payer: COMMERCIAL

## 2019-12-13 DIAGNOSIS — E66.01 OBESITY, MORBID, BMI 40.0-49.9 (HCC): ICD-10-CM

## 2019-12-13 DIAGNOSIS — R06.83 SNORING: ICD-10-CM

## 2019-12-13 DIAGNOSIS — R51.9 MORNING HEADACHE: ICD-10-CM

## 2019-12-13 DIAGNOSIS — G47.10 HYPERSOMNIA: ICD-10-CM

## 2019-12-13 PROCEDURE — 95806 SLEEP STUDY UNATT&RESP EFFT: CPT

## 2019-12-20 ENCOUNTER — TELEPHONE (OUTPATIENT)
Dept: PULMONOLOGY | Age: 59
End: 2019-12-20

## 2019-12-20 DIAGNOSIS — G47.33 OSA (OBSTRUCTIVE SLEEP APNEA): Primary | ICD-10-CM

## 2020-01-08 RX ORDER — NIFEDIPINE 60 MG/1
TABLET, EXTENDED RELEASE ORAL
Qty: 90 TABLET | Refills: 0 | Status: SHIPPED | OUTPATIENT
Start: 2020-01-08 | End: 2020-04-24

## 2020-02-10 ENCOUNTER — OFFICE VISIT (OUTPATIENT)
Dept: FAMILY MEDICINE CLINIC | Age: 60
End: 2020-02-10
Payer: COMMERCIAL

## 2020-02-10 VITALS
BODY MASS INDEX: 44.41 KG/M2 | HEART RATE: 81 BPM | HEIGHT: 68 IN | DIASTOLIC BLOOD PRESSURE: 84 MMHG | OXYGEN SATURATION: 97 % | SYSTOLIC BLOOD PRESSURE: 136 MMHG | WEIGHT: 293 LBS

## 2020-02-10 PROBLEM — F33.1 MODERATE EPISODE OF RECURRENT MAJOR DEPRESSIVE DISORDER (HCC): Status: ACTIVE | Noted: 2020-02-10

## 2020-02-10 PROCEDURE — G8482 FLU IMMUNIZE ORDER/ADMIN: HCPCS | Performed by: INTERNAL MEDICINE

## 2020-02-10 PROCEDURE — 1036F TOBACCO NON-USER: CPT | Performed by: INTERNAL MEDICINE

## 2020-02-10 PROCEDURE — G8427 DOCREV CUR MEDS BY ELIG CLIN: HCPCS | Performed by: INTERNAL MEDICINE

## 2020-02-10 PROCEDURE — G8417 CALC BMI ABV UP PARAM F/U: HCPCS | Performed by: INTERNAL MEDICINE

## 2020-02-10 PROCEDURE — 99214 OFFICE O/P EST MOD 30 MIN: CPT | Performed by: INTERNAL MEDICINE

## 2020-02-10 PROCEDURE — 3017F COLORECTAL CA SCREEN DOC REV: CPT | Performed by: INTERNAL MEDICINE

## 2020-02-10 ASSESSMENT — ENCOUNTER SYMPTOMS
RESPIRATORY NEGATIVE: 1
GASTROINTESTINAL NEGATIVE: 1
ALLERGIC/IMMUNOLOGIC NEGATIVE: 1

## 2020-02-10 NOTE — PROGRESS NOTES
Subjective:      Patient ID: Anabella Daniels is a 62 y.o. female. HPI  Moderate episode of recurrent major depressive disorder ([address]  Just started on Wellbutrin. Depression some worse recently. Obstructive sleep apnea syndrome  Has started C-pap. Essential hypertension  No change in meds, no c/o with meds, no chest pain, SOB, palpatations, or syncope. Home bp not taken. Pure hypercholesterolemia  Poor diet and wt is up 10 lbs from last yr. No c/o w/ meds. Past Medical History:   Diagnosis Date    Anxiety     with panic attacks    Arthritis     knees    Chronic back pain     Dental crowns present     upper left dental implant    Depression     Hyperlipidemia     borderline    Hypertension     Hypotension     Her Father after anesthesia    IBS (irritable bowel syndrome)     with rectal bleeding    Knee osteoarthritis 2013    Morbid obesity (HCC)     Positive H. pylori test 12/10/12    Prolonged emergence from general anesthesia      Current Outpatient Medications   Medication Sig Dispense Refill    NIFEdipine (PROCARDIA XL) 60 MG extended release tablet TAKE ONE TABLET BY MOUTH DAILY 90 tablet 0    losartan (COZAAR) 50 MG tablet TAKE ONE TABLET BY MOUTH DAILY 90 tablet 0    atorvastatin (LIPITOR) 20 MG tablet TAKE ONE TABLET BY MOUTH DAILY 90 tablet 0    omeprazole (PRILOSEC) 20 MG delayed release capsule Take 1 capsule by mouth every morning (before breakfast) 30 capsule 5    citalopram (CELEXA) 40 MG tablet Take 40 mg by mouth nightly       traZODone (DESYREL) 50 MG tablet Take 100 mg by mouth nightly       clonazepam (KLONOPIN) 2 MG tablet Take 2 mg by mouth nightly        No current facility-administered medications for this visit.       No Known Allergies  Social History     Tobacco Use    Smoking status: Former Smoker     Packs/day: 1.50     Years: 21.00     Pack years: 31.50     Last attempt to quit: 2015     Years since quittin.1    Smokeless tobacco: Never Psychiatric:         Speech: Speech normal.         Behavior: Behavior normal.         Thought Content: Thought content normal.         Judgment: Judgment normal.         Assessment:      Problem   Moderate Episode of Recurrent Major Depressive Disorder (Hcc). recnet change in meds. Obstructive Sleep Apnea Syndrome. Recently began C-pap   Essential Hypertension. Good w/ meds. Pure Hypercholesterolemia. Stable diet and wt. No c/o w/ meds. Plan:      All above problems reviewed and the found to be unchanged except for the following : Moderate Episode of Recurrent Major Depressive Disorder (Hcc). Continue meds and f/u w/ Psychiatrist as scheduled. Obstructive Sleep Apnea Syndrome. Continue C-pap. Continue to work on compliance. Essential Hypertension. Continue present meds. Home BP checks. Call if>140/90. Improve diet. Avoid caffeine and salt. Call if new c/o w/ meds. Pure Hypercholesterolemia. Will do labs and improve diet. Will call if need to adjust meds.               Yandel Bonner MD

## 2020-03-04 RX ORDER — LOSARTAN POTASSIUM 50 MG/1
TABLET ORAL
Qty: 90 TABLET | Refills: 0 | Status: SHIPPED | OUTPATIENT
Start: 2020-03-04 | End: 2020-06-08

## 2020-03-04 RX ORDER — ATORVASTATIN CALCIUM 20 MG/1
TABLET, FILM COATED ORAL
Qty: 90 TABLET | Refills: 0 | Status: SHIPPED | OUTPATIENT
Start: 2020-03-04 | End: 2020-06-08

## 2020-03-18 ENCOUNTER — TELEPHONE (OUTPATIENT)
Dept: PULMONOLOGY | Age: 60
End: 2020-03-18

## 2020-04-24 RX ORDER — NIFEDIPINE 60 MG/1
TABLET, EXTENDED RELEASE ORAL
Qty: 90 TABLET | Refills: 0 | Status: SHIPPED | OUTPATIENT
Start: 2020-04-24 | End: 2020-07-27

## 2020-05-17 RX ORDER — OMEPRAZOLE 20 MG/1
CAPSULE, DELAYED RELEASE ORAL
Qty: 30 CAPSULE | Refills: 3 | Status: SHIPPED | OUTPATIENT
Start: 2020-05-17 | End: 2020-08-10

## 2020-06-08 RX ORDER — LOSARTAN POTASSIUM 50 MG/1
TABLET ORAL
Qty: 90 TABLET | Refills: 1 | Status: SHIPPED | OUTPATIENT
Start: 2020-06-08 | End: 2020-12-11

## 2020-06-08 RX ORDER — ATORVASTATIN CALCIUM 20 MG/1
TABLET, FILM COATED ORAL
Qty: 90 TABLET | Refills: 1 | Status: SHIPPED | OUTPATIENT
Start: 2020-06-08 | End: 2020-12-11

## 2020-06-24 ENCOUNTER — TELEPHONE (OUTPATIENT)
Dept: PULMONOLOGY | Age: 60
End: 2020-06-24

## 2020-07-27 RX ORDER — NIFEDIPINE 60 MG/1
TABLET, EXTENDED RELEASE ORAL
Qty: 90 TABLET | Refills: 1 | Status: SHIPPED | OUTPATIENT
Start: 2020-07-27 | End: 2020-12-11

## 2020-08-10 RX ORDER — OMEPRAZOLE 20 MG/1
CAPSULE, DELAYED RELEASE ORAL
Qty: 90 CAPSULE | Refills: 2 | Status: SHIPPED | OUTPATIENT
Start: 2020-08-10 | End: 2021-04-05

## 2020-12-11 RX ORDER — ATORVASTATIN CALCIUM 20 MG/1
TABLET, FILM COATED ORAL
Qty: 90 TABLET | Refills: 0 | Status: SHIPPED | OUTPATIENT
Start: 2020-12-11 | End: 2021-03-08 | Stop reason: SDUPTHER

## 2020-12-11 RX ORDER — LOSARTAN POTASSIUM 50 MG/1
TABLET ORAL
Qty: 90 TABLET | Refills: 0 | Status: SHIPPED | OUTPATIENT
Start: 2020-12-11 | End: 2021-03-11

## 2020-12-11 RX ORDER — NIFEDIPINE 60 MG/1
TABLET, EXTENDED RELEASE ORAL
Qty: 90 TABLET | Refills: 0 | Status: SHIPPED | OUTPATIENT
Start: 2020-12-11 | End: 2021-03-11

## 2020-12-11 NOTE — TELEPHONE ENCOUNTER
Pt called back is very worried about covid. Does not want to come in for a physical. Wants to know if she can still get her refills. Please advise.

## 2020-12-11 NOTE — TELEPHONE ENCOUNTER
Jes Hill is requesting refill(s) lipitor, losartan, nifedipine  Last OV 2/10/20 (pertaining to medication)  LR 6/8/20 (per medication requested)  Next office visit scheduled or attempted Yes   If no, reason:  LM to schedule, pt is overdue, was due in August for H&P

## 2020-12-11 NOTE — TELEPHONE ENCOUNTER
Patient was advised scripts have already been sent in for her and advised her of all our safety measures we are taking to keep our healthy patients healthy but to call when she is ready to schedule.

## 2021-02-12 ENCOUNTER — TELEPHONE (OUTPATIENT)
Dept: FAMILY MEDICINE CLINIC | Age: 61
End: 2021-02-12

## 2021-02-12 NOTE — TELEPHONE ENCOUNTER
Pt said she has terrible anxiety this morning that is making her sick. She is wondering if her appt at 11am today can be done virtually.

## 2021-02-17 ENCOUNTER — OFFICE VISIT (OUTPATIENT)
Dept: ORTHOPEDIC SURGERY | Age: 61
End: 2021-02-17
Payer: COMMERCIAL

## 2021-02-17 VITALS — WEIGHT: 293 LBS | BODY MASS INDEX: 44.41 KG/M2 | HEIGHT: 68 IN

## 2021-02-17 DIAGNOSIS — M16.12 PRIMARY OSTEOARTHRITIS OF LEFT HIP: ICD-10-CM

## 2021-02-17 DIAGNOSIS — M76.32 IT BAND SYNDROME, LEFT: ICD-10-CM

## 2021-02-17 DIAGNOSIS — M79.652 LEFT THIGH PAIN: Primary | ICD-10-CM

## 2021-02-17 PROCEDURE — G8417 CALC BMI ABV UP PARAM F/U: HCPCS | Performed by: NURSE PRACTITIONER

## 2021-02-17 PROCEDURE — G8427 DOCREV CUR MEDS BY ELIG CLIN: HCPCS | Performed by: NURSE PRACTITIONER

## 2021-02-17 PROCEDURE — G8484 FLU IMMUNIZE NO ADMIN: HCPCS | Performed by: NURSE PRACTITIONER

## 2021-02-17 PROCEDURE — 99213 OFFICE O/P EST LOW 20 MIN: CPT | Performed by: NURSE PRACTITIONER

## 2021-02-17 PROCEDURE — 3017F COLORECTAL CA SCREEN DOC REV: CPT | Performed by: NURSE PRACTITIONER

## 2021-02-17 PROCEDURE — 1036F TOBACCO NON-USER: CPT | Performed by: NURSE PRACTITIONER

## 2021-02-17 RX ORDER — MELOXICAM 7.5 MG/1
15 TABLET ORAL DAILY
Qty: 30 TABLET | Refills: 3 | Status: SHIPPED | OUTPATIENT
Start: 2021-02-17 | End: 2021-05-25 | Stop reason: ALTCHOICE

## 2021-02-17 NOTE — PROGRESS NOTES
Subjective    Patient ID: Karan Benson is a 56 y.o..  female. Chief Complaint   Patient presents with    Pain     pain lt thigh       Thigh Pain  Patient complains of left thigh pain. The patient reports she started having lateral thigh pain about one week ago. No known ANAT, it just started one day. She reports she has had numbness in her thigh for several years after a block in that leg during knee surgery in the right knee. She has had tingling in her thigh recently. The numbness and tingling do not radiate. She has taken ibuprofen as well as used ice and heat with no relief. The patient reports she has had a decrease in activity due to covid, she has only left her house a handful of times in the past year. Patient's medications, allergies, past medical, surgical, social and family histories were reviewed and updated as appropriate. Physical Exam:   Constitutional:  Pt well groomed, no acute distress, well developed, no obvious deformities  Vitals:    02/17/21 1406   Weight: (!) 313 lb (142 kg)   Height: 5' 8\" (1.727 m)     -Oriented to person, place, and time  -mood and affect are appropriate     Hip and thigh exam - left hip exam shows;    -range of motion of bilateral hips is full range of motion  - The patient does not have pain on motion  - there is tenderness over the lateral region, mostly over the IT band. No tenderness over anterior hip or groin. No tenderness over trochanteric bursa; difficult to discern due to body habitus  -there are not any masses  -there is not  deformity noted.   -negative log roll  -antalgic gait    Contralateral Exam:  -No obvious deformities  -No abrasions or cellulitis noted, NVI   -Full ROM   -No joint laxity  -no palpable tenderness noted    Neurological exam:   -Deep tendon reflexes are 2/4 at the knees and 2/4 at the ankles with strong extensor hallicus longus motor strength bilaterally.    --Distal pulses DP/PT: R. 2+; L. 2+.     Skin exam: There is not any cellulitis, lymphedema or cutaneous lesions noted in the lower extremities. Xray:  2 view (AP/Frog leg) of left hip show: imaging quality poor due to body habitus and positioning per tech; no acute fractures or deformities; decreased joint space bilateral hips    Assessment: left hip osteoarthritis, IT band syndrome    Plan: I gave the patient a prescription for mobic and advised her not to take any other NSAIDs while she is taking this. I did recommend formal physical therapy, which the patient refused at this time. She was given exercises to do at home. The patient requested pain medication, I advised her I was unable to give her narcotics at this time but was able to prescribe mobic. The patient will follow up with Dr. Rosetta Guerra to further discuss possible steroid injection. No orders of the defined types were placed in this encounter.

## 2021-02-22 ENCOUNTER — OFFICE VISIT (OUTPATIENT)
Dept: ORTHOPEDIC SURGERY | Age: 61
End: 2021-02-22
Payer: COMMERCIAL

## 2021-02-22 VITALS — HEIGHT: 68 IN | WEIGHT: 293 LBS | BODY MASS INDEX: 44.41 KG/M2

## 2021-02-22 DIAGNOSIS — M25.552 ACUTE HIP PAIN, LEFT: ICD-10-CM

## 2021-02-22 DIAGNOSIS — M70.62 GREATER TROCHANTERIC BURSITIS OF LEFT HIP: Primary | ICD-10-CM

## 2021-02-22 DIAGNOSIS — M76.32 IT BAND SYNDROME, LEFT: ICD-10-CM

## 2021-02-22 PROCEDURE — 99213 OFFICE O/P EST LOW 20 MIN: CPT | Performed by: ORTHOPAEDIC SURGERY

## 2021-02-22 PROCEDURE — 20610 DRAIN/INJ JOINT/BURSA W/O US: CPT | Performed by: ORTHOPAEDIC SURGERY

## 2021-02-22 RX ORDER — BETAMETHASONE SODIUM PHOSPHATE AND BETAMETHASONE ACETATE 3; 3 MG/ML; MG/ML
6 INJECTION, SUSPENSION INTRA-ARTICULAR; INTRALESIONAL; INTRAMUSCULAR; SOFT TISSUE ONCE
Status: COMPLETED | OUTPATIENT
Start: 2021-02-22 | End: 2021-02-22

## 2021-02-22 RX ORDER — BUPIVACAINE HYDROCHLORIDE 2.5 MG/ML
2 INJECTION, SOLUTION INFILTRATION; PERINEURAL ONCE
Status: COMPLETED | OUTPATIENT
Start: 2021-02-22 | End: 2021-02-22

## 2021-02-22 RX ORDER — ACETAMINOPHEN AND CODEINE PHOSPHATE 300; 30 MG/1; MG/1
1 TABLET ORAL EVERY 6 HOURS PRN
Qty: 28 TABLET | Refills: 0 | Status: SHIPPED | OUTPATIENT
Start: 2021-02-22 | End: 2021-03-01

## 2021-02-22 RX ORDER — LIDOCAINE HYDROCHLORIDE 10 MG/ML
2 INJECTION, SOLUTION INFILTRATION; PERINEURAL ONCE
Status: COMPLETED | OUTPATIENT
Start: 2021-02-22 | End: 2021-02-22

## 2021-02-22 RX ADMIN — BUPIVACAINE HYDROCHLORIDE 5 MG: 2.5 INJECTION, SOLUTION INFILTRATION; PERINEURAL at 11:39

## 2021-02-22 RX ADMIN — LIDOCAINE HYDROCHLORIDE 2 ML: 10 INJECTION, SOLUTION INFILTRATION; PERINEURAL at 11:40

## 2021-02-22 RX ADMIN — BETAMETHASONE SODIUM PHOSPHATE AND BETAMETHASONE ACETATE 6 MG: 3; 3 INJECTION, SUSPENSION INTRA-ARTICULAR; INTRALESIONAL; INTRAMUSCULAR; SOFT TISSUE at 11:37

## 2021-03-02 ENCOUNTER — TELEPHONE (OUTPATIENT)
Dept: ORTHOPEDIC SURGERY | Age: 61
End: 2021-03-02

## 2021-03-03 DIAGNOSIS — M25.552 ACUTE HIP PAIN, LEFT: ICD-10-CM

## 2021-03-03 DIAGNOSIS — M70.62 GREATER TROCHANTERIC BURSITIS OF LEFT HIP: ICD-10-CM

## 2021-03-03 DIAGNOSIS — M76.32 IT BAND SYNDROME, LEFT: Primary | ICD-10-CM

## 2021-03-04 DIAGNOSIS — M25.552 ACUTE HIP PAIN, LEFT: ICD-10-CM

## 2021-03-04 DIAGNOSIS — M70.62 GREATER TROCHANTERIC BURSITIS OF LEFT HIP: ICD-10-CM

## 2021-03-04 DIAGNOSIS — M76.32 IT BAND SYNDROME, LEFT: Primary | ICD-10-CM

## 2021-03-04 RX ORDER — ACETAMINOPHEN AND CODEINE PHOSPHATE 300; 30 MG/1; MG/1
1 TABLET ORAL EVERY 6 HOURS
Qty: 28 TABLET | Refills: 0 | Status: SHIPPED | OUTPATIENT
Start: 2021-03-04 | End: 2021-03-11

## 2021-03-08 ENCOUNTER — TELEPHONE (OUTPATIENT)
Dept: ORTHOPEDIC SURGERY | Age: 61
End: 2021-03-08

## 2021-03-08 ENCOUNTER — OFFICE VISIT (OUTPATIENT)
Dept: FAMILY MEDICINE CLINIC | Age: 61
End: 2021-03-08
Payer: COMMERCIAL

## 2021-03-08 VITALS
TEMPERATURE: 97.6 F | HEART RATE: 105 BPM | OXYGEN SATURATION: 96 % | DIASTOLIC BLOOD PRESSURE: 92 MMHG | SYSTOLIC BLOOD PRESSURE: 132 MMHG | RESPIRATION RATE: 18 BRPM

## 2021-03-08 DIAGNOSIS — M25.552 LEFT HIP PAIN: ICD-10-CM

## 2021-03-08 DIAGNOSIS — G47.33 OBSTRUCTIVE SLEEP APNEA SYNDROME: ICD-10-CM

## 2021-03-08 DIAGNOSIS — E66.01 OBESITY, MORBID, BMI 40.0-49.9 (HCC): ICD-10-CM

## 2021-03-08 DIAGNOSIS — I10 ESSENTIAL HYPERTENSION: ICD-10-CM

## 2021-03-08 DIAGNOSIS — F32.A FATIGUE DUE TO DEPRESSION: ICD-10-CM

## 2021-03-08 DIAGNOSIS — F33.1 MODERATE EPISODE OF RECURRENT MAJOR DEPRESSIVE DISORDER (HCC): ICD-10-CM

## 2021-03-08 DIAGNOSIS — E78.00 PURE HYPERCHOLESTEROLEMIA: ICD-10-CM

## 2021-03-08 DIAGNOSIS — R53.83 FATIGUE DUE TO DEPRESSION: ICD-10-CM

## 2021-03-08 DIAGNOSIS — Z00.00 ANNUAL PHYSICAL EXAM: Primary | ICD-10-CM

## 2021-03-08 DIAGNOSIS — M17.12 PRIMARY OSTEOARTHRITIS OF LEFT KNEE: ICD-10-CM

## 2021-03-08 LAB
BASOPHILS ABSOLUTE: 0 K/UL (ref 0–0.2)
BASOPHILS RELATIVE PERCENT: 0.7 %
EOSINOPHILS ABSOLUTE: 0.1 K/UL (ref 0–0.6)
EOSINOPHILS RELATIVE PERCENT: 1.4 %
HCT VFR BLD CALC: 44.5 % (ref 36–48)
HEMOGLOBIN: 14.9 G/DL (ref 12–16)
LYMPHOCYTES ABSOLUTE: 1.8 K/UL (ref 1–5.1)
LYMPHOCYTES RELATIVE PERCENT: 28.1 %
MCH RBC QN AUTO: 28 PG (ref 26–34)
MCHC RBC AUTO-ENTMCNC: 33.5 G/DL (ref 31–36)
MCV RBC AUTO: 83.7 FL (ref 80–100)
MONOCYTES ABSOLUTE: 0.6 K/UL (ref 0–1.3)
MONOCYTES RELATIVE PERCENT: 9.2 %
NEUTROPHILS ABSOLUTE: 3.8 K/UL (ref 1.7–7.7)
NEUTROPHILS RELATIVE PERCENT: 60.6 %
PDW BLD-RTO: 15.1 % (ref 12.4–15.4)
PLATELET # BLD: 254 K/UL (ref 135–450)
PMV BLD AUTO: 8 FL (ref 5–10.5)
RBC # BLD: 5.31 M/UL (ref 4–5.2)
WBC # BLD: 6.4 K/UL (ref 4–11)

## 2021-03-08 PROCEDURE — 99396 PREV VISIT EST AGE 40-64: CPT | Performed by: INTERNAL MEDICINE

## 2021-03-08 PROCEDURE — 36415 COLL VENOUS BLD VENIPUNCTURE: CPT | Performed by: INTERNAL MEDICINE

## 2021-03-08 PROCEDURE — G8484 FLU IMMUNIZE NO ADMIN: HCPCS | Performed by: INTERNAL MEDICINE

## 2021-03-08 RX ORDER — ATORVASTATIN CALCIUM 20 MG/1
20 TABLET, FILM COATED ORAL DAILY
Qty: 90 TABLET | Refills: 1 | Status: SHIPPED | OUTPATIENT
Start: 2021-03-08 | End: 2021-03-11

## 2021-03-08 SDOH — ECONOMIC STABILITY: FOOD INSECURITY: WITHIN THE PAST 12 MONTHS, YOU WORRIED THAT YOUR FOOD WOULD RUN OUT BEFORE YOU GOT MONEY TO BUY MORE.: NEVER TRUE

## 2021-03-08 SDOH — ECONOMIC STABILITY: FOOD INSECURITY: WITHIN THE PAST 12 MONTHS, THE FOOD YOU BOUGHT JUST DIDN'T LAST AND YOU DIDN'T HAVE MONEY TO GET MORE.: NEVER TRUE

## 2021-03-08 ASSESSMENT — ENCOUNTER SYMPTOMS
ALLERGIC/IMMUNOLOGIC NEGATIVE: 1
RESPIRATORY NEGATIVE: 1
GASTROINTESTINAL NEGATIVE: 1
EYES NEGATIVE: 1

## 2021-03-08 NOTE — PROGRESS NOTES
Subjective:      Patient ID: Trini Kang is a 56 y.o. female. HPI  Moderate episode of recurrent major depressive disorder ([address]  Still not well controlled w/ Citalopram, Trazodone, and Clonazepam. Sees Psychiatrist regularly. Obstructive sleep apnea syndrome  Didn't use C-pap and got rid of it. Essential hypertension  No change in meds, no c/o with meds, no chest pain, SOB, palpatations, or syncope. Home bp not taken. Pure hypercholesterolemia  Poor diet and wt is up 10 lbs from last yr. No c/o w/ meds. Obesity, morbid, BMI 40.0-49.9 ([address]  Wt is up. s/p Gastric surgery 12/8/2017. Hasn't seen surgery. Knee osteoarthritis  S/p R TKR. L OA severe. Left hip pain  Severe pain on/off. Saw Ortho. Injected. Needs MRI . Annual physical exam  Mammo: 3/27/2018  Pap: 4/2019  Colonoscopy: 5/17/2018. rechx 5 yrs  DEXA: age 70  Eye: due  Hearing: passed in office exam  Immunnization: Shingrix vaccine soon, Flu shot in [place].  MMSE: na  Get Up and Go: na  Tob: nonsmoker/never  ETOH: none  Caffeine: moderate am  Cardiac Risk Assessment: pending. Living will:  no  Medical power of : no           Review of Systems   Constitutional: Positive for fatigue. HENT: Negative. Eyes: Negative. Respiratory: Negative. Cardiovascular: Negative. Gastrointestinal: Negative. Endocrine: Negative. Genitourinary: Negative. Musculoskeletal: Negative. Skin: Negative. Allergic/Immunologic: Negative. Neurological: Negative. Hematological: Negative. Psychiatric/Behavioral: Positive for sleep disturbance. Vitals:    03/08/21 1429 03/08/21 1454   BP: 128/86 (!) 132/92   Pulse: 105    Resp: 18    Temp: 97.6 °F (36.4 °C)    SpO2: 96%        Objective:   Physical Exam  Constitutional:       General: She is not in acute distress. Appearance: Normal appearance. She is well-developed. She is not ill-appearing, toxic-appearing or diaphoretic.    HENT:      Head: Normocephalic and atraumatic. Right Ear: Hearing, tympanic membrane, ear canal and external ear normal.      Left Ear: Hearing, tympanic membrane, ear canal and external ear normal.      Nose: Nose normal.      Right Sinus: No maxillary sinus tenderness or frontal sinus tenderness. Left Sinus: No maxillary sinus tenderness or frontal sinus tenderness. Mouth/Throat:      Pharynx: Uvula midline. No oropharyngeal exudate. Eyes:      General: Lids are normal.      Conjunctiva/sclera: Conjunctivae normal.      Pupils: Pupils are equal, round, and reactive to light. Funduscopic exam:     Right eye: No hemorrhage, exudate, AV nicking, arteriolar narrowing or papilledema. Left eye: No hemorrhage, exudate, AV nicking, arteriolar narrowing or papilledema. Neck:      Musculoskeletal: Full passive range of motion without pain, normal range of motion and neck supple. No neck rigidity or muscular tenderness. Thyroid: No thyromegaly. Vascular: Normal carotid pulses. No carotid bruit, hepatojugular reflux or JVD. Trachea: Trachea and phonation normal. No tracheal deviation. Cardiovascular:      Rate and Rhythm: Normal rate and regular rhythm. No extrasystoles are present. Chest Wall: PMI is not displaced. Pulses: Normal pulses. No decreased pulses. Carotid pulses are 2+ on the right side and 2+ on the left side. Radial pulses are 2+ on the right side and 2+ on the left side. Femoral pulses are 2+ on the right side and 2+ on the left side. Popliteal pulses are 2+ on the right side and 2+ on the left side. Dorsalis pedis pulses are 2+ on the right side and 2+ on the left side. Posterior tibial pulses are 2+ on the right side and 2+ on the left side. Heart sounds: Normal heart sounds. No murmur. No friction rub. No gallop. Pulmonary:      Effort: Pulmonary effort is normal. No respiratory distress. Breath sounds: Normal breath sounds. No stridor. No decreased breath sounds, wheezing, rhonchi or rales. Chest:      Chest wall: No tenderness. Abdominal:      General: Bowel sounds are normal. There is no distension or abdominal bruit. Palpations: Abdomen is soft. There is no shifting dullness, fluid wave, mass or pulsatile mass. Tenderness: There is no abdominal tenderness. There is no right CVA tenderness, left CVA tenderness, guarding or rebound. Hernia: No hernia is present. There is no hernia in the ventral area. Genitourinary:     Exam position: Supine. Comments: NE  Musculoskeletal: Normal range of motion. General: No tenderness. Right lower leg: No edema. Left lower leg: No edema. Lymphadenopathy:      Head:      Right side of head: No submental, submandibular, tonsillar, preauricular, posterior auricular or occipital adenopathy. Left side of head: No submental, submandibular, tonsillar, preauricular, posterior auricular or occipital adenopathy. Cervical: No cervical adenopathy. Upper Body:      Right upper body: No supraclavicular or epitrochlear adenopathy. Left upper body: No supraclavicular or epitrochlear adenopathy. Skin:     General: Skin is warm and dry. Capillary Refill: Capillary refill takes less than 2 seconds. Coloration: Skin is not jaundiced or pale. Findings: No abrasion, bruising, erythema, lesion or rash. Nails: There is no clubbing. Neurological:      General: No focal deficit present. Mental Status: She is alert and oriented to person, place, and time. Mental status is at baseline. She is not disoriented. Cranial Nerves: No cranial nerve deficit. Sensory: No sensory deficit. Motor: No weakness, tremor, atrophy, abnormal muscle tone or seizure activity. Coordination: Coordination normal.      Gait: Gait normal.      Deep Tendon Reflexes: Reflexes are normal and symmetric.  Reflexes normal. Babinski sign absent on the right side. Babinski sign absent on the left side. Reflex Scores:       Tricep reflexes are 2+ on the right side and 2+ on the left side. Bicep reflexes are 2+ on the right side and 2+ on the left side. Brachioradialis reflexes are 2+ on the right side and 2+ on the left side. Patellar reflexes are 2+ on the right side and 2+ on the left side. Achilles reflexes are 2+ on the right side and 2+ on the left side. Psychiatric:         Speech: Speech normal.         Behavior: Behavior normal.         Thought Content: Thought content normal.         Judgment: Judgment normal.         Assessment / Plan:      Annual Physical Exam. Must do Mammogram, eye exam, and Gyn exam.      Moderate Episode of Recurrent Major Depressive Disorder (Hcc). Not doing that well. To discuss w/ Psychiatrist med changes. Obesity, Morbid, Bmi 40.0-49.9 (Hcc). Must improve diet and lose weight. To call Gastric bypass surgeon. Obstructive Sleep Apnea Syndrome. To restart C-pap. To sleep MD.     Essential Hypertension. Continue present meds. Home BP checks. Call if>140/90. Improve diet. Avoid caffeine and salt. Call if new c/o w/ meds. Pure Hypercholesterolemia. To improve diet and lose weight. Will do labs and adjust med if needed. Left Hip Pain. To f/u w/ Ortho. MRI hip should be next. Knee Osteoarthritis. To ORtho needs replacement.

## 2021-03-08 NOTE — PATIENT INSTRUCTIONS
Annual Physical Exam. Must do Mammogram, eye exam, and Gyn exam.      Moderate Episode of Recurrent Major Depressive Disorder (Hcc). Not doing that well. To discuss w/ Psychiatrist med changes. Obesity, Morbid, Bmi 40.0-49.9 (Hcc). Must improve diet and lose weight. To call Gastric bypass surgeon. Obstructive Sleep Apnea Syndrome. To restart C-pap. To sleep MD.     Essential Hypertension. Continue present meds. Home BP checks. Call if>140/90. Improve diet. Avoid caffeine and salt. Call if new c/o w/ meds. Pure Hypercholesterolemia. To improve diet and lose weight. Will do labs and adjust med if needed. Left Hip Pain. To f/u w/ Ortho. MRI hip should be next. Knee Osteoarthritis. To ORtho needs replacement. Mammo: 3/27/2018  Pap: 4/2019  Colonoscopy: 5/17/2018. rechx 5 yrs  DEXA: age 70  Eye: due  Hearing: passed in office exam  Immunnization: Shingrix vaccine soon, Flu shot in [place].  MMSE: na  Get Up and Go: na  Tob: nonsmoker/never  ETOH: none  Caffeine: moderate am  Cardiac Risk Assessment: pending.   Living will:  no  Medical power of : no

## 2021-03-08 NOTE — TELEPHONE ENCOUNTER
General Question     Subject: pt is calling about change around some things around   Patient and /or Facility Request: Letty Salas she said she apologizes to Patria Snider, but she has to move some thing around.      Contact Number: [phone]

## 2021-03-08 NOTE — ASSESSMENT & PLAN NOTE
Mammo: 3/27/2018  Pap: 4/2019  Colonoscopy: 5/17/2018. rechx 5 yrs  DEXA: age 70  Eye: due  Hearing: passed in office exam  Immunnization: Shingrix vaccine soon, Flu shot in [place].  MMSE: na  Get Up and Go: na  Tob: nonsmoker/never  ETOH: none  Caffeine: moderate am  Cardiac Risk Assessment: pending.   Living will:  no  Medical power of : no

## 2021-03-09 ENCOUNTER — IMMUNIZATION (OUTPATIENT)
Dept: PRIMARY CARE CLINIC | Age: 61
End: 2021-03-09
Payer: COMMERCIAL

## 2021-03-09 LAB
ALBUMIN SERPL-MCNC: 4 G/DL (ref 3.4–5)
ALP BLD-CCNC: 114 U/L (ref 40–129)
ALT SERPL-CCNC: 28 U/L (ref 10–40)
ANION GAP SERPL CALCULATED.3IONS-SCNC: 15 MMOL/L (ref 3–16)
AST SERPL-CCNC: 21 U/L (ref 15–37)
BILIRUB SERPL-MCNC: 0.4 MG/DL (ref 0–1)
BILIRUBIN DIRECT: <0.2 MG/DL (ref 0–0.3)
BILIRUBIN, INDIRECT: NORMAL MG/DL (ref 0–1)
BUN BLDV-MCNC: 9 MG/DL (ref 7–20)
CALCIUM SERPL-MCNC: 9.2 MG/DL (ref 8.3–10.6)
CHLORIDE BLD-SCNC: 105 MMOL/L (ref 99–110)
CHOLESTEROL, TOTAL: 196 MG/DL (ref 0–199)
CO2: 23 MMOL/L (ref 21–32)
CREAT SERPL-MCNC: 0.6 MG/DL (ref 0.6–1.2)
GFR AFRICAN AMERICAN: >60
GFR NON-AFRICAN AMERICAN: >60
GLUCOSE BLD-MCNC: 97 MG/DL (ref 70–99)
HDLC SERPL-MCNC: 45 MG/DL (ref 40–60)
LDL CHOLESTEROL CALCULATED: 110 MG/DL
POTASSIUM SERPL-SCNC: 4.1 MMOL/L (ref 3.5–5.1)
SODIUM BLD-SCNC: 143 MMOL/L (ref 136–145)
TOTAL PROTEIN: 6.6 G/DL (ref 6.4–8.2)
TRIGL SERPL-MCNC: 203 MG/DL (ref 0–150)
TSH SERPL DL<=0.05 MIU/L-ACNC: 1.24 UIU/ML (ref 0.27–4.2)
VLDLC SERPL CALC-MCNC: 41 MG/DL

## 2021-03-09 PROCEDURE — 0011A PR IMM ADMN SARSCOV2 100 MCG/0.5 ML 1ST DOSE: CPT | Performed by: FAMILY MEDICINE

## 2021-03-09 PROCEDURE — 91301 COVID-19, MODERNA VACCINE 100MCG/0.5ML DOSE: CPT | Performed by: FAMILY MEDICINE

## 2021-03-11 RX ORDER — ATORVASTATIN CALCIUM 20 MG/1
TABLET, FILM COATED ORAL
Qty: 90 TABLET | Refills: 0 | Status: SHIPPED | OUTPATIENT
Start: 2021-03-11 | End: 2021-11-11

## 2021-03-11 RX ORDER — LOSARTAN POTASSIUM 50 MG/1
TABLET ORAL
Qty: 90 TABLET | Refills: 0 | Status: SHIPPED | OUTPATIENT
Start: 2021-03-11 | End: 2021-06-09

## 2021-03-11 RX ORDER — NIFEDIPINE 60 MG/1
TABLET, EXTENDED RELEASE ORAL
Qty: 90 TABLET | Refills: 0 | Status: SHIPPED | OUTPATIENT
Start: 2021-03-11 | End: 2021-06-09

## 2021-03-16 DIAGNOSIS — M16.12 PRIMARY OSTEOARTHRITIS OF LEFT HIP: Primary | ICD-10-CM

## 2021-03-16 RX ORDER — METHYLPREDNISOLONE 4 MG/1
TABLET ORAL
Qty: 1 KIT | Refills: 0 | Status: SHIPPED | OUTPATIENT
Start: 2021-03-16 | End: 2021-03-22

## 2021-03-29 ENCOUNTER — TELEPHONE (OUTPATIENT)
Dept: ORTHOPEDIC SURGERY | Age: 61
End: 2021-03-29

## 2021-04-05 RX ORDER — OMEPRAZOLE 20 MG/1
CAPSULE, DELAYED RELEASE ORAL
Qty: 90 CAPSULE | Refills: 2 | Status: SHIPPED | OUTPATIENT
Start: 2021-04-05 | End: 2022-02-22

## 2021-04-06 ENCOUNTER — IMMUNIZATION (OUTPATIENT)
Dept: PRIMARY CARE CLINIC | Age: 61
End: 2021-04-06
Payer: COMMERCIAL

## 2021-04-06 PROCEDURE — 0012A COVID-19, MODERNA VACCINE 100MCG/0.5ML DOSE: CPT | Performed by: FAMILY MEDICINE

## 2021-04-06 PROCEDURE — 91301 COVID-19, MODERNA VACCINE 100MCG/0.5ML DOSE: CPT | Performed by: FAMILY MEDICINE

## 2021-04-07 PROBLEM — Z00.00 ANNUAL PHYSICAL EXAM: Status: RESOLVED | Noted: 2019-08-19 | Resolved: 2021-04-07

## 2021-05-04 ENCOUNTER — OFFICE VISIT (OUTPATIENT)
Dept: ORTHOPEDIC SURGERY | Age: 61
End: 2021-05-04
Payer: COMMERCIAL

## 2021-05-04 VITALS — HEIGHT: 68 IN | BODY MASS INDEX: 44.41 KG/M2 | WEIGHT: 293 LBS

## 2021-05-04 DIAGNOSIS — M17.12 PRIMARY OSTEOARTHRITIS OF LEFT KNEE: ICD-10-CM

## 2021-05-04 DIAGNOSIS — M25.562 ACUTE PAIN OF LEFT KNEE: Primary | ICD-10-CM

## 2021-05-04 PROCEDURE — 1036F TOBACCO NON-USER: CPT | Performed by: ORTHOPAEDIC SURGERY

## 2021-05-04 PROCEDURE — 3017F COLORECTAL CA SCREEN DOC REV: CPT | Performed by: ORTHOPAEDIC SURGERY

## 2021-05-04 PROCEDURE — G8428 CUR MEDS NOT DOCUMENT: HCPCS | Performed by: ORTHOPAEDIC SURGERY

## 2021-05-04 PROCEDURE — G8417 CALC BMI ABV UP PARAM F/U: HCPCS | Performed by: ORTHOPAEDIC SURGERY

## 2021-05-04 PROCEDURE — 99213 OFFICE O/P EST LOW 20 MIN: CPT | Performed by: ORTHOPAEDIC SURGERY

## 2021-05-04 NOTE — LETTER
[address] [facility], [address] [place] [address] 81 [address] [facility], [address] [facility]    Date:  2021                                    PRE OP ORDERS    Name: Jose Juan Mccall    : 1960    Please take this form with you. [facility] 3 WKS PRIOR TO SURGERY    THE FOLLOWING LABS NEED TO BE COMPLETED:    _x__UA  _x__URINE C/S (THIS NEEDS TO BE DONE EVEN IF THE UA IS NORMAL)  _x__CBC W/ DIFF  _x__PT/INR  _x__PTT  _x__TRANSFERRIN  _x__ALBUMIN  _x__CHEM 7  _x__HEMAGLOBIN A1-C if patient is diabetic  _x___EKG if NOT already completed by PCP/Cardiologist                           Diagnosis:  Left knee OSTEOARTHRITIS            RT KNEE OA M17.11      LT KNEE OA M17.12         RT HIP OA  M16.11         LT HIP OA M16.12         RT SHLD OA  M19.011   LT SHLD OA  M19.012             Z01.812  (Pre-op examination code)              Isis Handy.  Nanci Guevara MD        2021 3:42 PM

## 2021-05-04 NOTE — PROGRESS NOTES
KNEE VISIT      HISTORY OF PRESENT ILLNESS    Jailyn Schwarz is a 56 y.o. female who presents for evaluation of her left knee. She was treated in the past for it and was actually had undergone a subchondroplasty of the medial femoral condyle which helped for just a short period of time. She has had other issues going on and now feels that she is ready to have her total knee done which we discussed in the past.  She grades her pain 4-8 over 10 depending upon activity and is concerned because she lives alone. She has had some falls and that is also an issue for her. She has had a right total knee replacement done several years ago and has done well with that. ROS    Well-documented patient history form dated 5/4/2021  All other ROS negative except for above. Past Surgical history    Past Surgical History:   Procedure Laterality Date    CHOLECYSTECTOMY  2008    COLONOSCOPY      due age 46    COLONOSCOPY  12/10/12    FOOT FRACTURE SURGERY      FOOT SURGERY  11/2011    Right peroneal tendon Connor Fox); ACP peroneal tendons with US guided injection    JOINT REPLACEMENT Right 2013    knee    KNEE ARTHROSCOPY Left 10/13/2017    Left knee DVA with partial medial menisectomy and loose body removal; internal fixation of insuff.  Fx's of the MFT/MTP with bone substitute    SLEEVE GASTRECTOMY  12/08/2017    LAPAROSCOPIC SLEEVE GASTRECTOMY WITH HIATAL HERNIA REPAIR    TONSILLECTOMY  1963    UPPER GASTROINTESTINAL ENDOSCOPY  12/10/2012    gastritis       PAST MEDICAL    Past Medical History:   Diagnosis Date    Anxiety     with panic attacks    Arthritis     knees    Chronic back pain     Dental crowns present     upper left dental implant    Depression     Hyperlipidemia     borderline    Hypertension     Hypotension     Her Father after anesthesia    IBS (irritable bowel syndrome)     with rectal bleeding    Knee osteoarthritis 1/25/2013    Morbid obesity (HCC)     Positive H. pylori test 12/10/12    Prolonged emergence from general anesthesia        Allergies    No Known Allergies    Meds    Current Outpatient Medications   Medication Sig Dispense Refill    omeprazole (PRILOSEC) 20 MG delayed release capsule TAKE ONE CAPSULE BY MOUTH EVERY MORNING BEFORE BREAKFAST 90 capsule 2    atorvastatin (LIPITOR) 20 MG tablet TAKE ONE TABLET BY MOUTH DAILY 90 tablet 0    losartan (COZAAR) 50 MG tablet TAKE ONE TABLET BY MOUTH DAILY 90 tablet 0    NIFEdipine (PROCARDIA XL) 60 MG extended release tablet TAKE ONE TABLET BY MOUTH DAILY 90 tablet 0    meloxicam (MOBIC) 7.5 MG tablet Take 2 tablets by mouth daily (Patient not taking: Reported on 3/8/2021) 30 tablet 3    citalopram (CELEXA) 40 MG tablet Take 40 mg by mouth nightly       traZODone (DESYREL) 50 MG tablet Take 100 mg by mouth nightly       clonazepam (KLONOPIN) 2 MG tablet Take 2 mg by mouth nightly        No current facility-administered medications for this visit.         Social    Social History     Socioeconomic History    Marital status:      Spouse name: Not on file    Number of children: Not on file    Years of education: Not on file    Highest education level: Not on file   Occupational History    Not on file   Social Needs    Financial resource strain: Not very hard    Food insecurity     Worry: Never true     Inability: Never true    Transportation needs     Medical: No     Non-medical: No   Tobacco Use    Smoking status: Former Smoker     Packs/day: 1.50     Years: 21.00     Pack years: 31.50     Quit date: 2015     Years since quittin.4    Smokeless tobacco: Never Used    Tobacco comment: still chews nicotine gum 2mg (20 pieces per day)   Substance and Sexual Activity    Alcohol use: No     Alcohol/week: 0.0 standard drinks    Drug use: No    Sexual activity: Not Currently   Lifestyle    Physical activity     Days per week: Not on file     Minutes per session: Not on file    Stress: Not on file Relationships    Social connections     Talks on phone: Not on file     Gets together: Not on file     Attends [employer] service: Not on file     Active member of club or organization: Not on file     Attends meetings of clubs or organizations: Not on file     Relationship status: Not on file    Intimate partner violence     Fear of current or ex partner: Not on file     Emotionally abused: Not on file     Physically abused: Not on file     Forced sexual activity: Not on file   Other Topics Concern    Not on file   Social History Narrative    Not on file       Family HISTORY    Family History   Problem Relation Age of Onset    Cancer Mother         pancreatic cancer    High Cholesterol Mother     Arthritis Mother     High Blood Pressure Father     Diabetes Father     Obesity Father     Other Father         gout, spinal stenosis    Arthritis Sister     Arthritis Maternal Grandmother     Substance Abuse Maternal Grandfather     High Blood Pressure Paternal Grandmother        PHYSICAL EXAM    Vital Signs:  Ht 5' 8\" (1.727 m)   Wt (!) 313 lb (142 kg)   BMI 47.59 kg/m²   General Appearance: Obese body habitus. Alert and oriented to person, place, and time. Affect:  Normal.   Gait:  Normal. Good balance and coordination. Skin:  Intact. Sensation:  Intact. Strength:  Intact. Reflexes:  Intact. Pulses:  Intact. Knee Exam:    Effusion: Negative    Range of Motion Right Left   Extension 0 0   Flexion 110 110     Provocative Test Right Left    Positive Negative Positive Negative   Anterior drawer [] [x] [] [x]   Lachman [] [x] [] [x]   Posterior drawer [] [x] [] [x]   Varus testing [] [x] [x] []   Valgus testing [] [x] [x] []   Joint line tenderness [x] [] [x] []     Additional Exam Comments: Her neurocirculatory lymphatic exam otherwise is normal symmetric both lower extremities. She has more truncal obesity and her legs are not relatively as bad as her trunk.       IMAGING STUDIES    X-rays 3 views of the left knee demonstrate severe end-stage osteoarthritis with postoperative changes from her subchondroplasty. IMPRESSION    Severe end-stage osteoarthritis left knee    PLAN      1. Conservative care options including physical therapy, NSAIDs, bracing, and activity modification were discussed. 2.  The indications for therapeutic injections were discussed. 3.  The indications for additional imaging studies were discussed. 4.  After considering the various options discussed, the patient elected to pursue a course that includes proceeding with left total knee replacement since she is failed to improve over time with medicines therapy injections and less invasive surgery. The patient was counseled at length about the risks of jerzy Covid-19 during their perioperative period and any recovery window from their procedure. The patient was made aware that jerzy Covid-19  may worsen their prognosis for recovering from their procedure  and lend to a higher morbidity and/or mortality risk. All material risks, benefits, and reasonable alternatives including postponing the procedure were discussed. The patient does wish to proceed with the procedure at this time. INFORMED CONSENT NOTE        We discussed the risks, benefits, and alternatives to the proposed procedure, as well as the necessity of other members of the healthcare team participating in the procedure. All questions were answered and the patient elected to proceed with the proposed procedure and signed the informed consent form.

## 2021-05-18 ENCOUNTER — TELEPHONE (OUTPATIENT)
Dept: ORTHOPEDIC SURGERY | Age: 61
End: 2021-05-18

## 2021-05-25 ENCOUNTER — OFFICE VISIT (OUTPATIENT)
Dept: FAMILY MEDICINE CLINIC | Age: 61
End: 2021-05-25
Payer: COMMERCIAL

## 2021-05-25 ENCOUNTER — TELEPHONE (OUTPATIENT)
Dept: ORTHOPEDIC SURGERY | Age: 61
End: 2021-05-25

## 2021-05-25 VITALS
OXYGEN SATURATION: 98 % | HEART RATE: 91 BPM | DIASTOLIC BLOOD PRESSURE: 82 MMHG | HEIGHT: 66 IN | RESPIRATION RATE: 18 BRPM | WEIGHT: 293 LBS | BODY MASS INDEX: 47.09 KG/M2 | SYSTOLIC BLOOD PRESSURE: 122 MMHG

## 2021-05-25 DIAGNOSIS — Z01.818 PREOP EXAMINATION: Primary | ICD-10-CM

## 2021-05-25 LAB
ALBUMIN SERPL-MCNC: 4.4 G/DL (ref 3.4–5)
ANION GAP SERPL CALCULATED.3IONS-SCNC: 14 MMOL/L (ref 3–16)
APTT: 34.5 SEC (ref 24.2–36.2)
BACTERIA: ABNORMAL /HPF
BASOPHILS ABSOLUTE: 0.1 K/UL (ref 0–0.2)
BASOPHILS RELATIVE PERCENT: 0.8 %
BILIRUBIN URINE: ABNORMAL
BLOOD, URINE: NEGATIVE
BUN BLDV-MCNC: 9 MG/DL (ref 7–20)
CALCIUM SERPL-MCNC: 9.1 MG/DL (ref 8.3–10.6)
CHLORIDE BLD-SCNC: 104 MMOL/L (ref 99–110)
CLARITY: ABNORMAL
CO2: 23 MMOL/L (ref 21–32)
COLOR: ABNORMAL
COMMENT UA: ABNORMAL
CREAT SERPL-MCNC: 0.7 MG/DL (ref 0.6–1.2)
CRYSTALS, UA: ABNORMAL /HPF
EOSINOPHILS ABSOLUTE: 0 K/UL (ref 0–0.6)
EOSINOPHILS RELATIVE PERCENT: 0.5 %
EPITHELIAL CELLS, UA: 18 /HPF (ref 0–5)
GFR AFRICAN AMERICAN: >60
GFR NON-AFRICAN AMERICAN: >60
GLUCOSE BLD-MCNC: 97 MG/DL (ref 70–99)
GLUCOSE URINE: NEGATIVE MG/DL
HCT VFR BLD CALC: 45.5 % (ref 36–48)
HEMOGLOBIN: 15 G/DL (ref 12–16)
INR BLD: 0.97 (ref 0.86–1.14)
KETONES, URINE: ABNORMAL MG/DL
LEUKOCYTE ESTERASE, URINE: NEGATIVE
LYMPHOCYTES ABSOLUTE: 2.3 K/UL (ref 1–5.1)
LYMPHOCYTES RELATIVE PERCENT: 26.4 %
MCH RBC QN AUTO: 27.3 PG (ref 26–34)
MCHC RBC AUTO-ENTMCNC: 32.9 G/DL (ref 31–36)
MCV RBC AUTO: 82.9 FL (ref 80–100)
MICROSCOPIC EXAMINATION: YES
MONOCYTES ABSOLUTE: 0.7 K/UL (ref 0–1.3)
MONOCYTES RELATIVE PERCENT: 7.9 %
MUCUS: ABNORMAL /LPF
NEUTROPHILS ABSOLUTE: 5.5 K/UL (ref 1.7–7.7)
NEUTROPHILS RELATIVE PERCENT: 64.4 %
NITRITE, URINE: NEGATIVE
PDW BLD-RTO: 14.7 % (ref 12.4–15.4)
PH UA: 6 (ref 5–8)
PLATELET # BLD: 329 K/UL (ref 135–450)
PMV BLD AUTO: 7.6 FL (ref 5–10.5)
POTASSIUM SERPL-SCNC: 4.3 MMOL/L (ref 3.5–5.1)
PROTEIN UA: 30 MG/DL
PROTHROMBIN TIME: 11.2 SEC (ref 10–13.2)
RBC # BLD: 5.49 M/UL (ref 4–5.2)
RBC UA: ABNORMAL /HPF (ref 0–4)
SODIUM BLD-SCNC: 141 MMOL/L (ref 136–145)
SPECIFIC GRAVITY UA: 1.03 (ref 1–1.03)
TRANSFERRIN: 386 MG/DL (ref 200–360)
URINE TYPE: ABNORMAL
UROBILINOGEN, URINE: 0.2 E.U./DL
WBC # BLD: 8.6 K/UL (ref 4–11)
WBC UA: 23 /HPF (ref 0–5)
YEAST: PRESENT /HPF

## 2021-05-25 PROCEDURE — 99212 OFFICE O/P EST SF 10 MIN: CPT | Performed by: INTERNAL MEDICINE

## 2021-05-25 PROCEDURE — G8427 DOCREV CUR MEDS BY ELIG CLIN: HCPCS | Performed by: INTERNAL MEDICINE

## 2021-05-25 PROCEDURE — 36415 COLL VENOUS BLD VENIPUNCTURE: CPT | Performed by: INTERNAL MEDICINE

## 2021-05-25 PROCEDURE — 93000 ELECTROCARDIOGRAM COMPLETE: CPT | Performed by: INTERNAL MEDICINE

## 2021-05-25 PROCEDURE — G8417 CALC BMI ABV UP PARAM F/U: HCPCS | Performed by: INTERNAL MEDICINE

## 2021-05-25 NOTE — PROGRESS NOTES
Subjective:      Ladan Lima is a 56 y.o. female who presents to the office today for a preoperative consultation at the request of surgeon Dr Marla Loera who plans on performing L TKR  on June 21. This consultation is requested for the specific conditions prompting preoperative evaluation (i.e. because of potential affect on operative risk): HTN, ESTHER, Hyperlipidemia, Obesitiy. Planned anesthesia is General.  The patient has the following known anesthesia issues: issues waking up from anesthesia. Patient has a bleeding risk of : no recent abnormal bleeding, no remote history of abnormal bleeding, use of Ca-channel blockers (see med list)  Patient does not have objection to receiving blood products if needed.   Past Medical History:   Diagnosis Date    Anxiety     with panic attacks    Arthritis     knees    Chronic back pain     Dental crowns present     upper left dental implant    Depression     Hyperlipidemia     borderline    Hypertension     Hypotension     Her Father after anesthesia    IBS (irritable bowel syndrome)     with rectal bleeding    Knee osteoarthritis 1/25/2013    Morbid obesity ([address]     Positive H. pylori test 12/10/12    Prolonged emergence from general anesthesia      Patient Active Problem List    Diagnosis Date Noted    Moderate episode of recurrent major depressive disorder ([address] 02/10/2020    Obesity, morbid, BMI 40.0-49.9 ([address] 11/06/2012    Obstructive sleep apnea syndrome     Essential hypertension 12/01/2010    Pure hypercholesterolemia 12/01/2010    Left hip pain 02/22/2021    Greater trochanteric bursitis of left hip 02/22/2021    It band syndrome, left 02/22/2021    Gastroesophageal reflux disease without esophagitis 08/19/2019    Lower abdominal pain 02/18/2019    Left foot pain 08/28/2018    Primary osteoarthritis of left foot 08/28/2018    S/P laparoscopic sleeve gastrectomy 12/08/2017    Insufficiency fracture of medial femoral condyle ([address] 08/22/2017    Degenerative tear of medial meniscus of left knee 08/22/2017    Primary osteoarthritis of left knee 08/22/2017    Insufficiency fracture of tibia 08/22/2017    Acute pain of left knee 04/25/2017    Localized osteoarthrosis, lower leg 11/18/2013    Right TKR 01/29/2013    Knee osteoarthritis 01/25/2013    Positive H. pylori test     Chronic back pain     IBS (irritable bowel syndrome)     Anxiety 12/01/2010     Past Surgical History:   Procedure Laterality Date    CHOLECYSTECTOMY  2008    COLONOSCOPY      due age 46    COLONOSCOPY  12/10/12    FOOT FRACTURE SURGERY      FOOT SURGERY  11/2011    Right peroneal tendon Jessica Marizol); ACP peroneal tendons with US guided injection    JOINT REPLACEMENT Right 2013    knee    KNEE ARTHROSCOPY Left 10/13/2017    Left knee DVA with partial medial menisectomy and loose body removal; internal fixation of insuff.  Fx's of the MFT/MTP with bone substitute    SLEEVE GASTRECTOMY  12/08/2017    LAPAROSCOPIC SLEEVE GASTRECTOMY WITH HIATAL HERNIA REPAIR    TONSILLECTOMY  1963    UPPER GASTROINTESTINAL ENDOSCOPY  12/10/2012    gastritis     Family History   Problem Relation Age of Onset    Cancer Mother         pancreatic cancer    High Cholesterol Mother     Arthritis Mother     High Blood Pressure Father     Diabetes Father     Obesity Father     Other Father         gout, spinal stenosis    Arthritis Sister     Arthritis Maternal Grandmother     Substance Abuse Maternal Grandfather     High Blood Pressure Paternal Grandmother      Current Outpatient Medications   Medication Sig Dispense Refill    omeprazole (PRILOSEC) 20 MG delayed release capsule TAKE ONE CAPSULE BY MOUTH EVERY MORNING BEFORE BREAKFAST 90 capsule 2    atorvastatin (LIPITOR) 20 MG tablet TAKE ONE TABLET BY MOUTH DAILY 90 tablet 0    losartan (COZAAR) 50 MG tablet TAKE ONE TABLET BY MOUTH DAILY 90 tablet 0    NIFEdipine (PROCARDIA XL) 60 MG extended release tablet TAKE ONE TABLET BY MOUTH DAILY 90 tablet 0    citalopram (CELEXA) 40 MG tablet Take 40 mg by mouth nightly       traZODone (DESYREL) 50 MG tablet Take 100 mg by mouth nightly       clonazepam (KLONOPIN) 2 MG tablet Take 2 mg by mouth nightly        No current facility-administered medications for this visit. Current Outpatient Medications on File Prior to Visit   Medication Sig Dispense Refill    omeprazole (PRILOSEC) 20 MG delayed release capsule TAKE ONE CAPSULE BY MOUTH EVERY MORNING BEFORE BREAKFAST 90 capsule 2    atorvastatin (LIPITOR) 20 MG tablet TAKE ONE TABLET BY MOUTH DAILY 90 tablet 0    losartan (COZAAR) 50 MG tablet TAKE ONE TABLET BY MOUTH DAILY 90 tablet 0    NIFEdipine (PROCARDIA XL) 60 MG extended release tablet TAKE ONE TABLET BY MOUTH DAILY 90 tablet 0    citalopram (CELEXA) 40 MG tablet Take 40 mg by mouth nightly       traZODone (DESYREL) 50 MG tablet Take 100 mg by mouth nightly       clonazepam (KLONOPIN) 2 MG tablet Take 2 mg by mouth nightly        No current facility-administered medications on file prior to visit.      No Known Allergies  Review of Systems  A comprehensive review of systems was negative except for: Musculoskeletal: positive for L Knee pain      Objective:      /86   Pulse 91   Resp 18   Ht 5' 6\" (1.676 m)   Wt (!) 350 lb 9.6 oz (159 kg)   SpO2 98%   BMI 56.59 kg/m²     General Appearance:  Alert, cooperative, no distress, Obese, appears stated age   Head:  Normocephalic, without obvious abnormality, atraumatic   Eyes:  PERRL, conjunctiva/corneas clear, EOM's intact, fundi benign, both eyes   Ears:  Normal TM's and external ear canals, both ears   Nose: Nares normal, septum midline,mucosa normal, no drainage or sinus tenderness   Throat: Lips, mucosa, and tongue normal; teeth and gums normal   Neck: Supple, symmetrical, trachea midline, no adenopathy;  thyroid: not enlarged, symmetric, no tenderness/mass/nodules; no carotid bruit or JVD   Back: Symmetric, no curvature, ROM normal, no CVA tenderness   Lungs:   Clear to auscultation bilaterally, respirations unlabored   Breasts:  NE   Heart:  Regular rate and rhythm, S1 and S2 normal, no murmur, rub, or gallop   Abdomen:   Soft, non-tender, bowel sounds active all four quadrants,  no masses, no organomegaly   Pelvic: Deferred   Extremities: S/p TKR R knee, L knee severe OA. Pulses: 2+ and symmetric   Skin: Skin color, texture, turgor normal, no rashes or lesions   Lymph nodes: Cervical, supraclavicular, and axillary nodes normal   Neurologic: Normal         Predictors of intubation difficulty:   Morbid obesity? yes    Anatomically abnormal facies? no   Prominent incisors? no   Receding mandible? no   Short, thick neck? no   Neck range of motion: normal    Dentition: No chipped, loose, or missing teeth. Cardiographics  ECG: normal sinus rhythm, no blocks or conduction defects, no ischemic changes     Lab Review   Lab Results   Component Value Date     03/08/2021    K 4.1 03/08/2021     03/08/2021    CO2 23 03/08/2021    BUN 9 03/08/2021    CREATININE 0.6 03/08/2021    GLUCOSE 97 03/08/2021    CALCIUM 9.2 03/08/2021     Lab Results   Component Value Date    WBC 6.4 03/08/2021    HGB 14.9 03/08/2021    HCT 44.5 03/08/2021    MCV 83.7 03/08/2021     03/08/2021     Lab Results   Component Value Date    CHOL 196 03/08/2021    TRIG 203 03/08/2021    HDL 45 03/08/2021    HDL 36 11/17/2011    LDLDIRECT 185 01/03/2017        Assessment:        56 y.o. female with planned surgery as above. Known risk factors for perioperative complications: None    Difficulty with intubation will be anticipated. Plan:      1. Preoperative workup as follows : NONE  2. Change in medication regimen before surgery: discontinue ASA 7d before surgery, discontinue NSAIDs, supplements, and vitamins 7d before surgery  3. Prophylaxis for cardiac events with perioperative beta-blockers: not indicated  4.  Invasive hemodynamic monitoring perioperatively: not indicated  5. Deep vein thrombosis prophylaxis postoperatively:regimen to be chosen by surgical team  6. Surveillance for postoperative MI with ECG immediately postoperatively and on postoperative days 1 and 2 AND troponin levels 24 hours postoperatively and on day 4 or hospital discharge (whichever comes first): not indicated  7. Other measures: NONE  MEDICALLY CLEARED FOR L TKR. PT DOES HAVE SLEEP APNEA. PT DOES HAVE ISSUES WAKING UP FROM ANESTHESIA.

## 2021-05-25 NOTE — TELEPHONE ENCOUNTER
PA requsted via [facility] by phone @ [phone] w/clinicals.   Reference # [id number]

## 2021-05-26 DIAGNOSIS — M17.12 PRIMARY OSTEOARTHRITIS OF LEFT KNEE: Primary | ICD-10-CM

## 2021-05-26 LAB — URINE CULTURE, ROUTINE: NORMAL

## 2021-05-26 NOTE — TELEPHONE ENCOUNTER
Dez Porter [id number]    Date: 6/21/2021  Type of SX:  Inpatient  Location: [facility]  CPT: 82975   DX Code: M17.12  SX area: Lt knee  Insurance: [facility]

## 2021-05-27 ENCOUNTER — TELEPHONE (OUTPATIENT)
Dept: ORTHOPEDIC SURGERY | Age: 61
End: 2021-05-27

## 2021-06-02 ENCOUNTER — CLINICAL DOCUMENTATION (OUTPATIENT)
Dept: OTHER | Age: 61
End: 2021-06-02

## 2021-06-10 RX ORDER — LOSARTAN POTASSIUM 50 MG/1
TABLET ORAL
Qty: 90 TABLET | Refills: 0 | Status: SHIPPED | OUTPATIENT
Start: 2021-06-10 | End: 2021-09-27 | Stop reason: SDUPTHER

## 2021-06-10 RX ORDER — NIFEDIPINE 60 MG/1
TABLET, EXTENDED RELEASE ORAL
Qty: 86 TABLET | Refills: 0 | Status: SHIPPED | OUTPATIENT
Start: 2021-06-10 | End: 2021-09-27 | Stop reason: SDUPTHER

## 2021-06-14 ENCOUNTER — TELEPHONE (OUTPATIENT)
Dept: ORTHOPEDIC SURGERY | Age: 61
End: 2021-06-14

## 2021-06-16 NOTE — PROGRESS NOTES
PRE OP INSTRUCTION SHEET   1. Do not eat or drink anything after 12 midnight  prior to surgery. This includes no water, chewing gum or mints. 2. Take the following pills will a small sip of water (see MAR)                                        3. Aspirin, Ibuprofen, Advil, Naproxen, Vitamin E, fish oil and other Anti-inflammatory products should be stopped for 5 days before surgery or as directed by your physician. 4. Check with your Doctor regarding stopping Plavix, Coumadin, Lovenox, Fragmin or other blood thinners   5. Do not smoke, and do not drink any alcoholic beverages 24 hours prior to surgery. This includes NA Beer. 6. You may brush your teeth and gargle the morning of surgery. DO NOT SWALLOW WATER   7. You MUST make arrangements for a responsible adult to take you home after your surgery. You will not be allowed to leave alone or drive yourself home. It is strongly suggested someone stay with you the first 24 hrs. Your surgery will be cancelled if you do not have a ride home. 8. A parent/legal guardian must accompany a child scheduled for surgery and plan to stay at the hospital until the child is discharged. Please do not bring other children with you. 9. Please wear simple, loose fitting clothing to the hospital.  Asif Herb not bring valuables (money, credit cards, checkbooks, etc.) Do not wear any makeup (including no eye makeup) or nail polish on your fingers or toes. 10. DO NOT wear any jewelry or piercings on day of surgery. All body piercing jewelry must be removed. 11. If you have dentures,glasses, or contacts they will be removed before going to the OR; we will provide you a container. 12. Please see your family doctor/and cardiologist for a history & physical and/or concerning medications. Bring any test results/reports from your physician's office. Have history and labs faxed to [phone].  Remember to bring Blood Bank bracelet on the day of surgery. 14. If you have a Living Will and Durable Power of  for Healthcare, please bring in a copy. 13. Notify your Surgeon if you develop any illness between now and surgery  time, cough, cold, fever, sore throat, nausea, vomiting, etc.  Please notify your surgeon if you experience dizziness, shortness of breath or blurred vision between now & the time of your surgery   16. DO NOT shave your operative site 96 hours prior to surgery. For face & neck surgery, men may use an electric razor 48 hours prior to surgery. 17. Shower with _x__Antibacterial soap (x_chlorhexidine for total joint  Pt's) shower two times before surgery.(the morning of and the night before. 18. To provide excellent care visitors will be limited to one in the room at any given time.   Please call pre admission testing if you any further questions [phone] or [phone]

## 2021-06-18 ENCOUNTER — ANESTHESIA EVENT (OUTPATIENT)
Dept: OPERATING ROOM | Age: 61
DRG: 469 | End: 2021-06-18
Payer: COMMERCIAL

## 2021-06-19 NOTE — ANESTHESIA PRE PROCEDURE
Department of Anesthesiology  Preprocedure Note       Name:  Jailyn Schwarz   Age:  56 y.o.  :  1960                                          MRN:  [id number]         Date:  2021      Surgeon: Venancio Aguero):  Rafael Norton MD    Procedure: Procedure(s):  LEFT TOTAL KNEE REPLACEMENT    Medications prior to admission:   Prior to Admission medications    Medication Sig Start Date End Date Taking? Authorizing Provider   NIFEdipine (PROCARDIA XL) 60 MG extended release tablet TAKE ONE TABLET BY MOUTH DAILY  Patient taking differently: Take 60 mg by mouth nightly  6/10/21  Yes Hawa Goodson MD   losartan (COZAAR) 50 MG tablet TAKE ONE TABLET BY MOUTH DAILY  Patient taking differently: Take 50 mg by mouth nightly  6/10/21  Yes Hawa Goodson MD   mupirocin (BACTROBAN) 2 % ointment APPLY 1/2 INCH TO INSIDE OF EACH NOSTRIL Q 12 HR STARTING 5 DAYS PRIOR TO SURGERY INCLUDING MORNING OF SURGERY. 21  Yes Rafael Norton MD   omeprazole (PRILOSEC) 20 MG delayed release capsule TAKE ONE CAPSULE BY MOUTH EVERY MORNING BEFORE BREAKFAST  Patient taking differently: Take 20 mg by mouth nightly  21  Yes KAREN Ortiz MD   atorvastatin (LIPITOR) 20 MG tablet TAKE ONE TABLET BY MOUTH DAILY  Patient taking differently: Take 20 mg by mouth nightly  3/11/21  Yes Hawa Goodson MD   citalopram (CELEXA) 40 MG tablet Take 40 mg by mouth nightly    Yes Historical Provider, MD   traZODone (DESYREL) 50 MG tablet Take 100 mg by mouth nightly  6/5/15  Yes Kevin Cortez   clonazepam [facility] 2 MG tablet Take 2 mg by mouth nightly  10/28/11  Yes Susana Guerin       Current medications:    No current facility-administered medications for this encounter.      Current Outpatient Medications   Medication Sig Dispense Refill    NIFEdipine (PROCARDIA XL) 60 MG extended release tablet TAKE ONE TABLET BY MOUTH DAILY (Patient taking differently: Take 60 mg by mouth nightly ) 86 tablet 0    losartan (COZAAR) M76.32       Past Medical History:        Diagnosis Date    Anxiety     with panic attacks    Arthritis     knees    Chronic back pain     Dental crowns present     upper left dental implant    Depression     Hyperlipidemia     borderline    Hypertension     Hypotension     Her Father after anesthesia    IBS (irritable bowel syndrome)     with rectal bleeding    Knee osteoarthritis 2013    Morbid obesity ([address]     Positive H. pylori test 12/10/12    Prolonged emergence from general anesthesia        Past Surgical History:        Procedure Laterality Date    CHOLECYSTECTOMY      COLONOSCOPY      due age 46    COLONOSCOPY  12/10/12    FOOT FRACTURE SURGERY      FOOT SURGERY  2011    Right peroneal tendon [employer]); ACP peroneal tendons with US guided injection    JOINT REPLACEMENT Right 2013    knee    KNEE ARTHROSCOPY Left 10/13/2017    Left knee DVA with partial medial menisectomy and loose body removal; internal fixation of insuff.  Fx's of the MFT/MTP with bone substitute    SLEEVE GASTRECTOMY  2017    LAPAROSCOPIC SLEEVE GASTRECTOMY WITH HIATAL HERNIA REPAIR    TONSILLECTOMY  1963    UPPER GASTROINTESTINAL ENDOSCOPY  12/10/2012    gastritis       Social History:    Social History     Tobacco Use    Smoking status: Former Smoker     Packs/day: 1.50     Years: 21.00     Pack years: 31.50     Quit date: 2015     Years since quittin.5    Smokeless tobacco: Never Used    Tobacco comment: still chews nicotine gum 2mg (20 pieces per day)   Substance Use Topics    Alcohol use: No     Alcohol/week: 0.0 standard drinks                                Counseling given: Not Answered  Comment: still chews nicotine gum 2mg (20 pieces per day)      Vital Signs (Current):   Vitals:    21 1116   Weight: (!) 350 lb (158.8 kg)   Height: 5' 7\" (1.702 m)                                              BP Readings from Last 3 Encounters:   21 122/82   21 (!) Cardiovascular:Negative CV ROS    (+) hypertension:, hyperlipidemia                  Neuro/Psych:   Negative Neuro/Psych ROS  (+) neuromuscular disease (Left leg numbness from prior block):, psychiatric history:depression/anxiety              ROS comment: Left leg numbness over thigh from a previous block GI/Hepatic/Renal: Neg GI/Hepatic/Renal ROS  (+) GERD: well controlled,      (-) hiatal hernia       Endo/Other: Negative Endo/Other ROS   (+) : arthritis:., .                 Abdominal:           Vascular:                                    Anesthesia Plan      general     ASA 4     (I discussed with the patient the risks and benefits of PIV, general anesthesia, IV Narcotics, PACU. All questions were answered the patient agrees with the plan and wishes to proceed. Patient with complete numbness on left leg from prior block, it has come back slightly but took years. Will use IV meds for pain and intraop injections. Discussed with Dr. Dorice Favre.)  Induction: intravenous. Pre-Operative Diagnosis: Primary osteoarthritis of left knee [M17.12]    56 y.o.   BMI:  Body mass index is 54.82 kg/m².      Vitals:    21 1116 21 0800   BP:  115/76   Pulse:  76   Resp:  20   Temp:  98.8 °F (37.1 °C)   TempSrc:  Infrared   SpO2:  95%   Weight: (!) 350 lb (158.8 kg)    Height: 5' 7\" (1.702 m)        No Known Allergies    Social History     Tobacco Use    Smoking status: Former Smoker     Packs/day: 1.50     Years: 21.00     Pack years: 31.50     Quit date: 2015     Years since quittin.5    Smokeless tobacco: Never Used    Tobacco comment: still chews nicotine gum 2mg (20 pieces per day)   Substance Use Topics    Alcohol use: No     Alcohol/week: 0.0 standard drinks       LABS:    CBC  Lab Results   Component Value Date/Time    WBC 8.6 2021 12:16 PM    HGB 15.0 2021 12:16 PM    HCT 45.5 2021 12:16 PM     2021 12:16 PM     RENAL  Lab Results   Component Value Date/Time     05/25/2021 12:16 PM    K 4.3 05/25/2021 12:16 PM     05/25/2021 12:16 PM    CO2 23 05/25/2021 12:16 PM    BUN 9 05/25/2021 12:16 PM    CREATININE 0.7 05/25/2021 12:16 PM    GLUCOSE 97 05/25/2021 12:16 PM     COAGS  Lab Results   Component Value Date/Time    PROTIME 11.2 05/25/2021 12:18 PM    INR 0.97 05/25/2021 12:18 PM    APTT 34.5 05/25/2021 12:18 PM     Bobby Pérez MD   6/19/2021

## 2021-06-21 ENCOUNTER — ANESTHESIA (OUTPATIENT)
Dept: OPERATING ROOM | Age: 61
DRG: 469 | End: 2021-06-21
Payer: COMMERCIAL

## 2021-06-21 ENCOUNTER — APPOINTMENT (OUTPATIENT)
Dept: GENERAL RADIOLOGY | Age: 61
DRG: 469 | End: 2021-06-21
Attending: ORTHOPAEDIC SURGERY
Payer: COMMERCIAL

## 2021-06-21 ENCOUNTER — HOSPITAL ENCOUNTER (INPATIENT)
Age: 61
LOS: 5 days | Discharge: NURSING, REHAB OR LONG-TERM CARE | DRG: 469 | End: 2021-06-26
Attending: ORTHOPAEDIC SURGERY | Admitting: ORTHOPAEDIC SURGERY
Payer: COMMERCIAL

## 2021-06-21 VITALS — TEMPERATURE: 96.6 F | SYSTOLIC BLOOD PRESSURE: 122 MMHG | DIASTOLIC BLOOD PRESSURE: 69 MMHG | OXYGEN SATURATION: 78 %

## 2021-06-21 DIAGNOSIS — F41.9 ANXIETY: Primary | ICD-10-CM

## 2021-06-21 DIAGNOSIS — Z96.659 STATUS POST TOTAL KNEE REPLACEMENT, UNSPECIFIED LATERALITY: ICD-10-CM

## 2021-06-21 LAB
ABO/RH: NORMAL
ANTIBODY SCREEN: NORMAL

## 2021-06-21 PROCEDURE — 6360000002 HC RX W HCPCS: Performed by: ORTHOPAEDIC SURGERY

## 2021-06-21 PROCEDURE — 73560 X-RAY EXAM OF KNEE 1 OR 2: CPT

## 2021-06-21 PROCEDURE — C9290 INJ, BUPIVACAINE LIPOSOME: HCPCS | Performed by: ORTHOPAEDIC SURGERY

## 2021-06-21 PROCEDURE — 6370000000 HC RX 637 (ALT 250 FOR IP): Performed by: ANESTHESIOLOGY

## 2021-06-21 PROCEDURE — C1713 ANCHOR/SCREW BN/BN,TIS/BN: HCPCS | Performed by: ORTHOPAEDIC SURGERY

## 2021-06-21 PROCEDURE — 36415 COLL VENOUS BLD VENIPUNCTURE: CPT

## 2021-06-21 PROCEDURE — 6360000002 HC RX W HCPCS: Performed by: NURSE ANESTHETIST, CERTIFIED REGISTERED

## 2021-06-21 PROCEDURE — 86900 BLOOD TYPING SEROLOGIC ABO: CPT

## 2021-06-21 PROCEDURE — 3700000001 HC ADD 15 MINUTES (ANESTHESIA): Performed by: ORTHOPAEDIC SURGERY

## 2021-06-21 PROCEDURE — 7100000000 HC PACU RECOVERY - FIRST 15 MIN: Performed by: ORTHOPAEDIC SURGERY

## 2021-06-21 PROCEDURE — 86901 BLOOD TYPING SEROLOGIC RH(D): CPT

## 2021-06-21 PROCEDURE — 3600000015 HC SURGERY LEVEL 5 ADDTL 15MIN: Performed by: ORTHOPAEDIC SURGERY

## 2021-06-21 PROCEDURE — 2709999900 HC NON-CHARGEABLE SUPPLY: Performed by: ORTHOPAEDIC SURGERY

## 2021-06-21 PROCEDURE — 3600000005 HC SURGERY LEVEL 5 BASE: Performed by: ORTHOPAEDIC SURGERY

## 2021-06-21 PROCEDURE — 86850 RBC ANTIBODY SCREEN: CPT

## 2021-06-21 PROCEDURE — L1830 KO IMMOB CANVAS LONG PRE OTS: HCPCS | Performed by: ORTHOPAEDIC SURGERY

## 2021-06-21 PROCEDURE — 2580000003 HC RX 258: Performed by: ANESTHESIOLOGY

## 2021-06-21 PROCEDURE — 2500000003 HC RX 250 WO HCPCS: Performed by: ORTHOPAEDIC SURGERY

## 2021-06-21 PROCEDURE — 2500000003 HC RX 250 WO HCPCS: Performed by: NURSE ANESTHETIST, CERTIFIED REGISTERED

## 2021-06-21 PROCEDURE — 7100000001 HC PACU RECOVERY - ADDTL 15 MIN: Performed by: ORTHOPAEDIC SURGERY

## 2021-06-21 PROCEDURE — 2580000003 HC RX 258: Performed by: ORTHOPAEDIC SURGERY

## 2021-06-21 PROCEDURE — 2700000000 HC OXYGEN THERAPY PER DAY

## 2021-06-21 PROCEDURE — 6360000002 HC RX W HCPCS: Performed by: ANESTHESIOLOGY

## 2021-06-21 PROCEDURE — 3700000000 HC ANESTHESIA ATTENDED CARE: Performed by: ORTHOPAEDIC SURGERY

## 2021-06-21 PROCEDURE — 6370000000 HC RX 637 (ALT 250 FOR IP): Performed by: ORTHOPAEDIC SURGERY

## 2021-06-21 PROCEDURE — 0SRD0J9 REPLACEMENT OF LEFT KNEE JOINT WITH SYNTHETIC SUBSTITUTE, CEMENTED, OPEN APPROACH: ICD-10-PCS | Performed by: ORTHOPAEDIC SURGERY

## 2021-06-21 PROCEDURE — 94761 N-INVAS EAR/PLS OXIMETRY MLT: CPT

## 2021-06-21 PROCEDURE — C1776 JOINT DEVICE (IMPLANTABLE): HCPCS | Performed by: ORTHOPAEDIC SURGERY

## 2021-06-21 PROCEDURE — 1200000000 HC SEMI PRIVATE

## 2021-06-21 DEVICE — CEMENT BNE 40GM W/ GENT HI VISC RADPQ FOR REV SURG: Type: IMPLANTABLE DEVICE | Site: KNEE | Status: FUNCTIONAL

## 2021-06-21 DEVICE — 35MM PATELLA, VITAMIN E
Type: IMPLANTABLE DEVICE | Site: KNEE | Status: FUNCTIONAL
Brand: BKS E-VITALIZE

## 2021-06-21 DEVICE — SIZE 5 TIBIAL TRAY NONPOROUS
Type: IMPLANTABLE DEVICE | Site: KNEE | Status: FUNCTIONAL
Brand: BALANCED KNEE SYSTEM

## 2021-06-21 DEVICE — LT SIZE 5 FEMORAL CR NONPOROUS
Type: IMPLANTABLE DEVICE | Site: KNEE | Status: FUNCTIONAL
Brand: BKS TRIMAX

## 2021-06-21 DEVICE — SIZE 5 7MM TIBIAL INSERT CR
Type: IMPLANTABLE DEVICE | Site: KNEE | Status: FUNCTIONAL
Brand: BKS E-VITALIZE

## 2021-06-21 RX ORDER — MAGNESIUM HYDROXIDE 1200 MG/15ML
LIQUID ORAL CONTINUOUS PRN
Status: COMPLETED | OUTPATIENT
Start: 2021-06-21 | End: 2021-06-21

## 2021-06-21 RX ORDER — MORPHINE SULFATE 10 MG/ML
1 INJECTION, SOLUTION INTRAMUSCULAR; INTRAVENOUS EVERY 5 MIN PRN
Status: DISCONTINUED | OUTPATIENT
Start: 2021-06-21 | End: 2021-06-21 | Stop reason: HOSPADM

## 2021-06-21 RX ORDER — MIDAZOLAM HYDROCHLORIDE 1 MG/ML
INJECTION INTRAMUSCULAR; INTRAVENOUS PRN
Status: DISCONTINUED | OUTPATIENT
Start: 2021-06-21 | End: 2021-06-21 | Stop reason: SDUPTHER

## 2021-06-21 RX ORDER — MEPERIDINE HYDROCHLORIDE 25 MG/ML
12.5 INJECTION INTRAMUSCULAR; INTRAVENOUS; SUBCUTANEOUS EVERY 5 MIN PRN
Status: DISCONTINUED | OUTPATIENT
Start: 2021-06-21 | End: 2021-06-21 | Stop reason: HOSPADM

## 2021-06-21 RX ORDER — SODIUM CHLORIDE 0.9 % (FLUSH) 0.9 %
5-40 SYRINGE (ML) INJECTION EVERY 12 HOURS SCHEDULED
Status: DISCONTINUED | OUTPATIENT
Start: 2021-06-21 | End: 2021-06-26 | Stop reason: HOSPADM

## 2021-06-21 RX ORDER — OXYCODONE HYDROCHLORIDE AND ACETAMINOPHEN 5; 325 MG/1; MG/1
2 TABLET ORAL PRN
Status: DISCONTINUED | OUTPATIENT
Start: 2021-06-21 | End: 2021-06-21 | Stop reason: HOSPADM

## 2021-06-21 RX ORDER — LABETALOL HYDROCHLORIDE 5 MG/ML
5 INJECTION, SOLUTION INTRAVENOUS EVERY 10 MIN PRN
Status: DISCONTINUED | OUTPATIENT
Start: 2021-06-21 | End: 2021-06-21 | Stop reason: HOSPADM

## 2021-06-21 RX ORDER — CELECOXIB 200 MG/1
200 CAPSULE ORAL ONCE
Status: COMPLETED | OUTPATIENT
Start: 2021-06-21 | End: 2021-06-21

## 2021-06-21 RX ORDER — OMEPRAZOLE 20 MG/1
20 CAPSULE, DELAYED RELEASE ORAL NIGHTLY
Status: DISCONTINUED | OUTPATIENT
Start: 2021-06-21 | End: 2021-06-26 | Stop reason: HOSPADM

## 2021-06-21 RX ORDER — CITALOPRAM 20 MG/1
40 TABLET ORAL NIGHTLY
Status: DISCONTINUED | OUTPATIENT
Start: 2021-06-21 | End: 2021-06-26 | Stop reason: HOSPADM

## 2021-06-21 RX ORDER — ONDANSETRON 2 MG/ML
INJECTION INTRAMUSCULAR; INTRAVENOUS PRN
Status: DISCONTINUED | OUTPATIENT
Start: 2021-06-21 | End: 2021-06-21 | Stop reason: SDUPTHER

## 2021-06-21 RX ORDER — ONDANSETRON 2 MG/ML
4 INJECTION INTRAMUSCULAR; INTRAVENOUS EVERY 6 HOURS PRN
Status: DISCONTINUED | OUTPATIENT
Start: 2021-06-21 | End: 2021-06-26 | Stop reason: HOSPADM

## 2021-06-21 RX ORDER — NIFEDIPINE 30 MG/1
60 TABLET, EXTENDED RELEASE ORAL DAILY
Status: DISCONTINUED | OUTPATIENT
Start: 2021-06-21 | End: 2021-06-26 | Stop reason: HOSPADM

## 2021-06-21 RX ORDER — LOSARTAN POTASSIUM 25 MG/1
50 TABLET ORAL DAILY
Status: DISCONTINUED | OUTPATIENT
Start: 2021-06-21 | End: 2021-06-26 | Stop reason: HOSPADM

## 2021-06-21 RX ORDER — ONDANSETRON 4 MG/1
4 TABLET, ORALLY DISINTEGRATING ORAL EVERY 8 HOURS PRN
Status: DISCONTINUED | OUTPATIENT
Start: 2021-06-21 | End: 2021-06-26 | Stop reason: HOSPADM

## 2021-06-21 RX ORDER — ACETAMINOPHEN 325 MG/1
650 TABLET ORAL EVERY 6 HOURS
Status: DISCONTINUED | OUTPATIENT
Start: 2021-06-21 | End: 2021-06-26 | Stop reason: HOSPADM

## 2021-06-21 RX ORDER — TRANEXAMIC ACID 100 MG/ML
1000 INJECTION, SOLUTION INTRAVENOUS 2 TIMES DAILY PRN
Status: DISCONTINUED | OUTPATIENT
Start: 2021-06-21 | End: 2021-06-21 | Stop reason: HOSPADM

## 2021-06-21 RX ORDER — OXYCODONE HYDROCHLORIDE 5 MG/1
10 TABLET ORAL EVERY 4 HOURS PRN
Status: DISCONTINUED | OUTPATIENT
Start: 2021-06-21 | End: 2021-06-26 | Stop reason: HOSPADM

## 2021-06-21 RX ORDER — MORPHINE SULFATE 10 MG/ML
2 INJECTION, SOLUTION INTRAMUSCULAR; INTRAVENOUS EVERY 5 MIN PRN
Status: DISCONTINUED | OUTPATIENT
Start: 2021-06-21 | End: 2021-06-21 | Stop reason: HOSPADM

## 2021-06-21 RX ORDER — HYDRALAZINE HYDROCHLORIDE 20 MG/ML
5 INJECTION INTRAMUSCULAR; INTRAVENOUS EVERY 10 MIN PRN
Status: DISCONTINUED | OUTPATIENT
Start: 2021-06-21 | End: 2021-06-21 | Stop reason: HOSPADM

## 2021-06-21 RX ORDER — FENTANYL CITRATE 50 UG/ML
INJECTION, SOLUTION INTRAMUSCULAR; INTRAVENOUS PRN
Status: DISCONTINUED | OUTPATIENT
Start: 2021-06-21 | End: 2021-06-21 | Stop reason: SDUPTHER

## 2021-06-21 RX ORDER — GABAPENTIN 300 MG/1
300 CAPSULE ORAL ONCE
Status: COMPLETED | OUTPATIENT
Start: 2021-06-21 | End: 2021-06-21

## 2021-06-21 RX ORDER — ONDANSETRON 2 MG/ML
4 INJECTION INTRAMUSCULAR; INTRAVENOUS PRN
Status: DISCONTINUED | OUTPATIENT
Start: 2021-06-21 | End: 2021-06-21 | Stop reason: HOSPADM

## 2021-06-21 RX ORDER — ATORVASTATIN CALCIUM 10 MG/1
20 TABLET, FILM COATED ORAL DAILY
Status: DISCONTINUED | OUTPATIENT
Start: 2021-06-21 | End: 2021-06-26 | Stop reason: HOSPADM

## 2021-06-21 RX ORDER — CELECOXIB 100 MG/1
100 CAPSULE ORAL 2 TIMES DAILY
Status: DISCONTINUED | OUTPATIENT
Start: 2021-06-21 | End: 2021-06-23

## 2021-06-21 RX ORDER — TRAZODONE HYDROCHLORIDE 100 MG/1
100 TABLET ORAL NIGHTLY
Status: DISCONTINUED | OUTPATIENT
Start: 2021-06-21 | End: 2021-06-26 | Stop reason: HOSPADM

## 2021-06-21 RX ORDER — PROPOFOL 10 MG/ML
INJECTION, EMULSION INTRAVENOUS PRN
Status: DISCONTINUED | OUTPATIENT
Start: 2021-06-21 | End: 2021-06-21 | Stop reason: SDUPTHER

## 2021-06-21 RX ORDER — SODIUM CHLORIDE 9 MG/ML
25 INJECTION, SOLUTION INTRAVENOUS PRN
Status: DISCONTINUED | OUTPATIENT
Start: 2021-06-21 | End: 2021-06-26 | Stop reason: HOSPADM

## 2021-06-21 RX ORDER — DEXAMETHASONE SODIUM PHOSPHATE 4 MG/ML
INJECTION, SOLUTION INTRA-ARTICULAR; INTRALESIONAL; INTRAMUSCULAR; INTRAVENOUS; SOFT TISSUE PRN
Status: DISCONTINUED | OUTPATIENT
Start: 2021-06-21 | End: 2021-06-21 | Stop reason: SDUPTHER

## 2021-06-21 RX ORDER — SODIUM CHLORIDE 9 MG/ML
INJECTION, SOLUTION INTRAVENOUS CONTINUOUS
Status: DISCONTINUED | OUTPATIENT
Start: 2021-06-21 | End: 2021-06-22

## 2021-06-21 RX ORDER — OXYCODONE HYDROCHLORIDE AND ACETAMINOPHEN 5; 325 MG/1; MG/1
1 TABLET ORAL PRN
Status: DISCONTINUED | OUTPATIENT
Start: 2021-06-21 | End: 2021-06-21 | Stop reason: HOSPADM

## 2021-06-21 RX ORDER — OXYCODONE HYDROCHLORIDE 5 MG/1
5 TABLET ORAL EVERY 4 HOURS PRN
Status: DISCONTINUED | OUTPATIENT
Start: 2021-06-21 | End: 2021-06-26 | Stop reason: HOSPADM

## 2021-06-21 RX ORDER — LIDOCAINE HYDROCHLORIDE 20 MG/ML
INJECTION, SOLUTION INFILTRATION; PERINEURAL PRN
Status: DISCONTINUED | OUTPATIENT
Start: 2021-06-21 | End: 2021-06-21 | Stop reason: SDUPTHER

## 2021-06-21 RX ORDER — SENNA AND DOCUSATE SODIUM 50; 8.6 MG/1; MG/1
1 TABLET, FILM COATED ORAL 2 TIMES DAILY
Status: DISCONTINUED | OUTPATIENT
Start: 2021-06-21 | End: 2021-06-26

## 2021-06-21 RX ORDER — PROMETHAZINE HYDROCHLORIDE 25 MG/ML
6.25 INJECTION, SOLUTION INTRAMUSCULAR; INTRAVENOUS
Status: DISCONTINUED | OUTPATIENT
Start: 2021-06-21 | End: 2021-06-21 | Stop reason: HOSPADM

## 2021-06-21 RX ORDER — GABAPENTIN 300 MG/1
300 CAPSULE ORAL 3 TIMES DAILY
Status: DISCONTINUED | OUTPATIENT
Start: 2021-06-21 | End: 2021-06-26 | Stop reason: HOSPADM

## 2021-06-21 RX ORDER — ACETAMINOPHEN 500 MG
1000 TABLET ORAL ONCE
Status: COMPLETED | OUTPATIENT
Start: 2021-06-21 | End: 2021-06-21

## 2021-06-21 RX ORDER — DIPHENHYDRAMINE HYDROCHLORIDE 50 MG/ML
12.5 INJECTION INTRAMUSCULAR; INTRAVENOUS
Status: DISCONTINUED | OUTPATIENT
Start: 2021-06-21 | End: 2021-06-21 | Stop reason: HOSPADM

## 2021-06-21 RX ORDER — SODIUM CHLORIDE 0.9 % (FLUSH) 0.9 %
5-40 SYRINGE (ML) INJECTION PRN
Status: DISCONTINUED | OUTPATIENT
Start: 2021-06-21 | End: 2021-06-26 | Stop reason: HOSPADM

## 2021-06-21 RX ORDER — HYDROMORPHONE HCL 110MG/55ML
PATIENT CONTROLLED ANALGESIA SYRINGE INTRAVENOUS PRN
Status: DISCONTINUED | OUTPATIENT
Start: 2021-06-21 | End: 2021-06-21 | Stop reason: SDUPTHER

## 2021-06-21 RX ORDER — SODIUM CHLORIDE, SODIUM LACTATE, POTASSIUM CHLORIDE, CALCIUM CHLORIDE 600; 310; 30; 20 MG/100ML; MG/100ML; MG/100ML; MG/100ML
INJECTION, SOLUTION INTRAVENOUS CONTINUOUS
Status: DISCONTINUED | OUTPATIENT
Start: 2021-06-21 | End: 2021-06-21

## 2021-06-21 RX ORDER — ROCURONIUM BROMIDE 10 MG/ML
INJECTION, SOLUTION INTRAVENOUS PRN
Status: DISCONTINUED | OUTPATIENT
Start: 2021-06-21 | End: 2021-06-21 | Stop reason: SDUPTHER

## 2021-06-21 RX ORDER — CLONAZEPAM 1 MG/1
2 TABLET ORAL NIGHTLY
Status: DISCONTINUED | OUTPATIENT
Start: 2021-06-21 | End: 2021-06-26 | Stop reason: HOSPADM

## 2021-06-21 RX ADMIN — LIDOCAINE HYDROCHLORIDE 50 MG: 20 INJECTION, SOLUTION INFILTRATION; PERINEURAL at 09:37

## 2021-06-21 RX ADMIN — CITALOPRAM HYDROBROMIDE 40 MG: 20 TABLET ORAL at 20:14

## 2021-06-21 RX ADMIN — ACETAMINOPHEN 650 MG: 325 TABLET ORAL at 20:14

## 2021-06-21 RX ADMIN — TRAZODONE HYDROCHLORIDE 100 MG: 100 TABLET ORAL at 20:15

## 2021-06-21 RX ADMIN — FENTANYL CITRATE 150 MCG: 50 INJECTION, SOLUTION INTRAMUSCULAR; INTRAVENOUS at 09:59

## 2021-06-21 RX ADMIN — OXYCODONE HYDROCHLORIDE 10 MG: 5 TABLET ORAL at 16:56

## 2021-06-21 RX ADMIN — CLONAZEPAM 2 MG: 1 TABLET ORAL at 20:15

## 2021-06-21 RX ADMIN — SODIUM CHLORIDE, PRESERVATIVE FREE 10 ML: 5 INJECTION INTRAVENOUS at 20:16

## 2021-06-21 RX ADMIN — ACETAMINOPHEN 1000 MG: 500 TABLET ORAL at 08:17

## 2021-06-21 RX ADMIN — SODIUM CHLORIDE, POTASSIUM CHLORIDE, SODIUM LACTATE AND CALCIUM CHLORIDE: 600; 310; 30; 20 INJECTION, SOLUTION INTRAVENOUS at 10:04

## 2021-06-21 RX ADMIN — SODIUM CHLORIDE, POTASSIUM CHLORIDE, SODIUM LACTATE AND CALCIUM CHLORIDE: 600; 310; 30; 20 INJECTION, SOLUTION INTRAVENOUS at 08:17

## 2021-06-21 RX ADMIN — DOCUSATE SODIUM 50MG AND SENNOSIDES 8.6MG 1 TABLET: 8.6; 5 TABLET, FILM COATED ORAL at 20:15

## 2021-06-21 RX ADMIN — Medication 3000 MG: at 09:29

## 2021-06-21 RX ADMIN — HYDROMORPHONE HYDROCHLORIDE 1 MG: 2 INJECTION, SOLUTION INTRAMUSCULAR; INTRAVENOUS; SUBCUTANEOUS at 11:43

## 2021-06-21 RX ADMIN — CELECOXIB 100 MG: 100 CAPSULE ORAL at 20:14

## 2021-06-21 RX ADMIN — SUGAMMADEX 400 MG: 100 INJECTION, SOLUTION INTRAVENOUS at 11:20

## 2021-06-21 RX ADMIN — ATORVASTATIN CALCIUM 20 MG: 10 TABLET, FILM COATED ORAL at 20:14

## 2021-06-21 RX ADMIN — HYDROMORPHONE HYDROCHLORIDE 0.5 MG: 1 INJECTION, SOLUTION INTRAMUSCULAR; INTRAVENOUS; SUBCUTANEOUS at 12:19

## 2021-06-21 RX ADMIN — MIDAZOLAM 2 MG: 1 INJECTION INTRAMUSCULAR; INTRAVENOUS at 09:30

## 2021-06-21 RX ADMIN — SODIUM CHLORIDE 25 ML: 9 INJECTION, SOLUTION INTRAVENOUS at 17:20

## 2021-06-21 RX ADMIN — HYDROMORPHONE HYDROCHLORIDE 0.5 MG: 2 INJECTION, SOLUTION INTRAMUSCULAR; INTRAVENOUS; SUBCUTANEOUS at 10:10

## 2021-06-21 RX ADMIN — ROCURONIUM BROMIDE 70 MG: 10 INJECTION, SOLUTION INTRAVENOUS at 09:37

## 2021-06-21 RX ADMIN — GABAPENTIN 300 MG: 300 CAPSULE ORAL at 08:17

## 2021-06-21 RX ADMIN — TRANEXAMIC ACID 1000 MG: 100 INJECTION, SOLUTION INTRAVENOUS at 09:45

## 2021-06-21 RX ADMIN — LOSARTAN POTASSIUM 50 MG: 25 TABLET, FILM COATED ORAL at 20:15

## 2021-06-21 RX ADMIN — ACETAMINOPHEN 650 MG: 325 TABLET ORAL at 15:49

## 2021-06-21 RX ADMIN — ONDANSETRON 4 MG: 2 INJECTION, SOLUTION INTRAMUSCULAR; INTRAVENOUS at 09:32

## 2021-06-21 RX ADMIN — FENTANYL CITRATE 100 MCG: 50 INJECTION, SOLUTION INTRAMUSCULAR; INTRAVENOUS at 09:37

## 2021-06-21 RX ADMIN — CEFAZOLIN 3000 MG: 10 INJECTION, POWDER, FOR SOLUTION INTRAVENOUS at 17:25

## 2021-06-21 RX ADMIN — PROPOFOL 200 MG: 10 INJECTION, EMULSION INTRAVENOUS at 09:37

## 2021-06-21 RX ADMIN — SODIUM CHLORIDE: 9 INJECTION, SOLUTION INTRAVENOUS at 15:47

## 2021-06-21 RX ADMIN — GABAPENTIN 300 MG: 300 CAPSULE ORAL at 20:15

## 2021-06-21 RX ADMIN — OMEPRAZOLE 20 MG: 20 CAPSULE, DELAYED RELEASE ORAL at 20:14

## 2021-06-21 RX ADMIN — HYDROMORPHONE HYDROCHLORIDE 0.5 MG: 2 INJECTION, SOLUTION INTRAMUSCULAR; INTRAVENOUS; SUBCUTANEOUS at 10:16

## 2021-06-21 RX ADMIN — ONDANSETRON 4 MG: 2 INJECTION, SOLUTION INTRAMUSCULAR; INTRAVENOUS at 11:02

## 2021-06-21 RX ADMIN — NIFEDIPINE 60 MG: 30 TABLET, FILM COATED, EXTENDED RELEASE ORAL at 20:14

## 2021-06-21 RX ADMIN — DEXAMETHASONE SODIUM PHOSPHATE 8 MG: 4 INJECTION, SOLUTION INTRAMUSCULAR; INTRAVENOUS at 09:51

## 2021-06-21 RX ADMIN — ROCURONIUM BROMIDE 30 MG: 10 INJECTION, SOLUTION INTRAVENOUS at 10:03

## 2021-06-21 RX ADMIN — GABAPENTIN 300 MG: 300 CAPSULE ORAL at 15:49

## 2021-06-21 RX ADMIN — ASPIRIN 325 MG: 325 TABLET, COATED ORAL at 20:15

## 2021-06-21 RX ADMIN — CELECOXIB 200 MG: 200 CAPSULE ORAL at 08:17

## 2021-06-21 ASSESSMENT — PULMONARY FUNCTION TESTS
PIF_VALUE: 29
PIF_VALUE: 0
PIF_VALUE: 0
PIF_VALUE: 26
PIF_VALUE: 28
PIF_VALUE: 26
PIF_VALUE: 28
PIF_VALUE: 0
PIF_VALUE: 28
PIF_VALUE: 0
PIF_VALUE: 26
PIF_VALUE: 28
PIF_VALUE: 1
PIF_VALUE: 29
PIF_VALUE: 27
PIF_VALUE: 25
PIF_VALUE: 27
PIF_VALUE: 25
PIF_VALUE: 28
PIF_VALUE: 28
PIF_VALUE: 3
PIF_VALUE: 6
PIF_VALUE: 28
PIF_VALUE: 26
PIF_VALUE: 25
PIF_VALUE: 8
PIF_VALUE: 2
PIF_VALUE: 28
PIF_VALUE: 28
PIF_VALUE: 26
PIF_VALUE: 28
PIF_VALUE: 29
PIF_VALUE: 29
PIF_VALUE: 27
PIF_VALUE: 27
PIF_VALUE: 28
PIF_VALUE: 22
PIF_VALUE: 28
PIF_VALUE: 28
PIF_VALUE: 29
PIF_VALUE: 28
PIF_VALUE: 29
PIF_VALUE: 1
PIF_VALUE: 29
PIF_VALUE: 27
PIF_VALUE: 25
PIF_VALUE: 29
PIF_VALUE: 27
PIF_VALUE: 29
PIF_VALUE: 27
PIF_VALUE: 17
PIF_VALUE: 29
PIF_VALUE: 26
PIF_VALUE: 29
PIF_VALUE: 25
PIF_VALUE: 25
PIF_VALUE: 29
PIF_VALUE: 4
PIF_VALUE: 30
PIF_VALUE: 27
PIF_VALUE: 26
PIF_VALUE: 27
PIF_VALUE: 27
PIF_VALUE: 28
PIF_VALUE: 29
PIF_VALUE: 28
PIF_VALUE: 2
PIF_VALUE: 28
PIF_VALUE: 29
PIF_VALUE: 25
PIF_VALUE: 28
PIF_VALUE: 27
PIF_VALUE: 29
PIF_VALUE: 28
PIF_VALUE: 25
PIF_VALUE: 28
PIF_VALUE: 3
PIF_VALUE: 28
PIF_VALUE: 28
PIF_VALUE: 0
PIF_VALUE: 29
PIF_VALUE: 4
PIF_VALUE: 27
PIF_VALUE: 28
PIF_VALUE: 25
PIF_VALUE: 25
PIF_VALUE: 2
PIF_VALUE: 0
PIF_VALUE: 25
PIF_VALUE: 25
PIF_VALUE: 29
PIF_VALUE: 28
PIF_VALUE: 29
PIF_VALUE: 28
PIF_VALUE: 3
PIF_VALUE: 28
PIF_VALUE: 24
PIF_VALUE: 27
PIF_VALUE: 28
PIF_VALUE: 25
PIF_VALUE: 27
PIF_VALUE: 28
PIF_VALUE: 27
PIF_VALUE: 1
PIF_VALUE: 28
PIF_VALUE: 29
PIF_VALUE: 25
PIF_VALUE: 1
PIF_VALUE: 27
PIF_VALUE: 1
PIF_VALUE: 3
PIF_VALUE: 28
PIF_VALUE: 29
PIF_VALUE: 28
PIF_VALUE: 29
PIF_VALUE: 28
PIF_VALUE: 25
PIF_VALUE: 29
PIF_VALUE: 27
PIF_VALUE: 28
PIF_VALUE: 27
PIF_VALUE: 28

## 2021-06-21 ASSESSMENT — PAIN DESCRIPTION - LOCATION
LOCATION: KNEE
LOCATION: LEG

## 2021-06-21 ASSESSMENT — PAIN SCALES - GENERAL
PAINLEVEL_OUTOF10: 0
PAINLEVEL_OUTOF10: 6
PAINLEVEL_OUTOF10: 10
PAINLEVEL_OUTOF10: 0
PAINLEVEL_OUTOF10: 0
PAINLEVEL_OUTOF10: 9
PAINLEVEL_OUTOF10: 4
PAINLEVEL_OUTOF10: 10

## 2021-06-21 ASSESSMENT — PAIN - FUNCTIONAL ASSESSMENT: PAIN_FUNCTIONAL_ASSESSMENT: 0-10

## 2021-06-21 ASSESSMENT — PAIN DESCRIPTION - ORIENTATION
ORIENTATION: LEFT
ORIENTATION: MID

## 2021-06-21 ASSESSMENT — PAIN DESCRIPTION - DESCRIPTORS: DESCRIPTORS: ACHING

## 2021-06-21 ASSESSMENT — PAIN DESCRIPTION - PAIN TYPE
TYPE: SURGICAL PAIN
TYPE: ACUTE PAIN

## 2021-06-21 NOTE — ANESTHESIA POSTPROCEDURE EVALUATION
PM        PLT                      329                 05/25/2021 12:16 PM   RENAL  Lab Results       Component                Value               Date/Time                  NA                       141                 05/25/2021 12:16 PM        K                        4.3                 05/25/2021 12:16 PM        CL                       104                 05/25/2021 12:16 PM        CO2                      23                  05/25/2021 12:16 PM        BUN                      9                   05/25/2021 12:16 PM        CREATININE               0.7                 05/25/2021 12:16 PM        GLUCOSE                  97                  05/25/2021 12:16 PM   COAGS  Lab Results       Component                Value               Date/Time                  PROTIME                  11.2                05/25/2021 12:18 PM        INR                      0.97                05/25/2021 12:18 PM        APTT                     34.5                05/25/2021 12:18 PM     Intake & Output:  @29JKQO@    Nausea & Vomiting:  No    Level of Consciousness:  Awake    Pain Assessment:  Adequate analgesia    Anesthesia Complications:  No apparent anesthetic complications    SUMMARY      Vital signs stable  OK to discharge from Stage I post anesthesia care.   Care transferred from Anesthesiology department on discharge from perioperative area

## 2021-06-21 NOTE — CARE COORDINATION
Case Management Assessment  Initial Evaluation      Patient Name: Jes Hill  YOB: 1960  Diagnosis: Primary osteoarthritis of left knee [M17.12]  Date / Time: 6/21/2021  7:23 AM    Admission status/Date: 6/21 inpt  Chart Reviewed: Yes      Patient Interviewed: Yes   Family Interviewed:  No      Hospitalization in the last 30 days:  No      Health Care Decision Maker :     (CM - must 1st enter selection under Navigator - emergency contact- Health Care Decision Maker Relationship and pick relationship)   Who do you trust or have selected to make healthcare decisions for you      Met with:  patient  Interview conducted  (bedside/phone):    Current PCP:  [facility] required for SNF : Y          3 night stay required -  [employer]  Support Systems/Care Needs: Family Members  Transportation: self    Meal Preparation: self    Housing  Living Arrangements:  Lives 2 story home  Steps: [address] [facility] for return to present living arrangements: Unsure  Identified Issues:     [address] : No Agency:(Services)  Type of Home Care Services: None  Passport/Waiver : No  :                      Phone Number:    Passport/Waiver Services: N/A          Durable Medical Equiptment   DME Provider: N/A  Equipment:   Walker_x__Cane___RTS___ BSC___Shower Chair___Hospital Bed___W/C____Other________  02 at ____Liter(s)---wears(frequency)_______ HHN ___ CPAP___ BiPap___   N/A____      Home O2 Use :  No    If No for home O2---if presently on O2 during hospitalization:  Yes  if yes CM to follow for potential DC O2 need  Informed of need for care provider to bring portable home O2 tank on day of discharge for nursing to connect prior to leaving:   Not Indicated  Verbalized agreement/Understanding:   Not Indicated    Community Service Affiliation  Dialysis:  No    · Agency:  · Location:  · Dialysis Schedule:  · Phone:   · Fax:     Other Community Services: (ex:PT/OT,Mental Health,Wound Clinic, Cardio/Pul [address]) none    DISCHARGE PLAN: Explained Case Management role/services. Reviewed chart and met with pt at bedside. Role of CM explained.  lives home alone and is unsure of plan. [facility] [place] [facility] planning for [facility] [address] Discussed poss home with [facility] and pt is agreeable if able. Discussed will await therapy evals. Will follow and finalize dcp after therapy sees pt.

## 2021-06-21 NOTE — PROGRESS NOTES
Patient admitted to room _213 from PACU after surgery for Left total knee. Patient oriented to room, call light, bed rails, phone, lights and bathroom. Patient instructed about the schedule of the day including: vital sign frequency, lab draws, possible tests, frequency of MD and staff rounds, daily weights, I &O's and prescribed diet. Bed alarm deferred patient  refuses alarm. Bed locked, in lowest position, side rails up 2/4, call light within reach. Recliner Assessment:     Patient is not able to demonstrate the ability to move from a reclining position to an upright position within the recliner due to post op. 4 Eyes Skin Assessment     The patient is being assess for   Admission    I agree that 2 RN's have performed a thorough Head to Toe Skin Assessment on the patient. ALL assessment sites listed below have been assessed. Areas assessed for pressure by both nurses:   [x]   Head, Face, and Ears   [x]   Shoulders, Back, and Chest, Abdomen  [x]   Arms, Elbows, and Hands   [x]   Coccyx, Sacrum, and Ischium  [x]   Legs, Feet, and Heels  Patient LLE with ace wrap foot to thigh due to Left total knee replacement today        Skin Assessed Under all Medical Devices by both nurses:  O2 device tubing                       SHARE this note so that the co-signing nurse is able to place an eSignature    Co-signer eSignature: Electronically signed by Gerson Kamara RN on 6/21/21 at 4:42 PM EDT    Does the Patient have Skin Breakdown related to pressure?   No       Adeel Prevention initiated:  Yes   Wound Care Orders initiated:  NA      [facility] nurse consulted for Pressure Injury (Stage 3,4, Unstageable, DTI, NWPT, Complex wounds)and New or Established Ostomies:  NA      Primary Nurse eSignature: Electronically signed by Phi Parry RN on 6/21/21 at 4:26 PM EDT

## 2021-06-21 NOTE — PROGRESS NOTES
Patient admitted to room 213 at 1405 from PACU. Patient alert and oriented. Purewick in place. VSS stable.

## 2021-06-21 NOTE — PROGRESS NOTES
4 Eyes Skin Assessment   Four eyes skin assessment completed at this time by Katherine Mart RN   and Stephen Desai RN. Assessment revealed: few pink scattered scratches noted on forearms and lower legs, no open areas    The patient is being assessed for  admission    I agree that 2 RN's have performed a thorough Head to Toe Skin Assessment on the patient. ALL assessment sites listed below have been assessed.        Areas assessed by both nurses:  [x]   Head, Face, and Ears   [x]   Shoulders, Back, and Chest  [x]   Arms, Elbows, and Hands   [x]   Coccyx, Sacrum, and Ischum  [x]   Legs, Feet, and Luis Aanand Arriaga RN

## 2021-06-21 NOTE — ACP (ADVANCE CARE PLANNING)
Advance Care Planning   Healthcare Decision Maker:    Primary Decision Maker: José Luis Ash child - [phone]    Click here to complete Healthcare Decision Makers including selection of the Healthcare Decision Maker Relationship (ie \"Primary\").

## 2021-06-21 NOTE — PROGRESS NOTES
Pt resting in bed quietly at this time. Denies any needs. All needs met  and call light within reach.

## 2021-06-21 NOTE — BRIEF OP NOTE
Brief Postoperative Note      Patient: Claude Ballard  YOB: 1960  MRN: [id number]    Date of Procedure: 6/21/2021    Pre-Op Diagnosis: LEFT KNEE OSTEOARTHRITIS    Post-Op Diagnosis: Same       Procedure(s):  LEFT TOTAL KNEE REPLACEMENT    Surgeon(s):  Rony Chin MD    Assistant:  Surgical Assistant: Gabby Wheeler    Anesthesia: General    Estimated Blood Loss (mL): less than 50     Complications: None    Specimens:   * No specimens in log *    Implants:  Implant Name Type Inv.  Item Serial No.  Lot No. LRB No. Used Action   CEMENT BNE 40GM W/ GENT HI VISC RADPQ FOR REV SURG  CEMENT BNE 40GM W/ GENT HI VISC RADPQ FOR REV SURG  [employer]- [id number] Left 2 Implanted   COMPONENT FEM SZ 5 L KNEE NP CRUCE RET BKS TRIMAX  COMPONENT FEM SZ 5 L KNEE NP CRUCE RET BKS TRIMAX  ORTHO DEVELOPMENT [employer] [id number] Left 1 Implanted   TRAY TIB SZ 5 NP A BAL KNEE  TRAY TIB SZ 5 NP A BAL KNEE  ORTHO DEVELOPMENT [employer] [id number] Left 1 Implanted   COMPONENT PATELLAR 35 MM KNEE VITAMIN-E  COMPONENT PATELLAR 35 MM KNEE VITAMIN-E  ORTHO DEVELOPMENT [employer] [id number] Left 1 Implanted   INSERT TIB SZ 5 THK7MM CRUCE RET BKS E VITALIZE  INSERT TIB SZ 5 THK7MM CRUCE RET BKS E VITALIZE  ORTHO DEVELOPMENT [employer] [id number] Left 1 Implanted         Drains: * No LDAs found *    Findings: brendan    Electronically signed by Rony Chin MD on 6/21/2021 at 12:12 PM

## 2021-06-21 NOTE — H&P
Update History & Physical    The patient's History and Physical  was reviewed with the patient and I examined the patient. There was no change. The surgical site was confirmed by the patient and me. Plan: The risks, benefits, expected outcome, and alternative to the recommended procedure have been discussed with the patient. Patient understands and wants to proceed with the procedure.      Electronically signed by Gregory Ta MD on 6/21/2021 at 7:35 AM

## 2021-06-22 LAB
BASOPHILS ABSOLUTE: 0 K/UL (ref 0–0.2)
BASOPHILS RELATIVE PERCENT: 0.1 %
EOSINOPHILS ABSOLUTE: 0 K/UL (ref 0–0.6)
EOSINOPHILS RELATIVE PERCENT: 0 %
HCT VFR BLD CALC: 36.3 % (ref 36–48)
HEMOGLOBIN: 12 G/DL (ref 12–16)
LYMPHOCYTES ABSOLUTE: 1.5 K/UL (ref 1–5.1)
LYMPHOCYTES RELATIVE PERCENT: 17.5 %
MCH RBC QN AUTO: 27.2 PG (ref 26–34)
MCHC RBC AUTO-ENTMCNC: 33.2 G/DL (ref 31–36)
MCV RBC AUTO: 81.9 FL (ref 80–100)
MONOCYTES ABSOLUTE: 1.1 K/UL (ref 0–1.3)
MONOCYTES RELATIVE PERCENT: 13.4 %
NEUTROPHILS ABSOLUTE: 5.9 K/UL (ref 1.7–7.7)
NEUTROPHILS RELATIVE PERCENT: 69 %
PDW BLD-RTO: 14.2 % (ref 12.4–15.4)
PLATELET # BLD: 255 K/UL (ref 135–450)
PMV BLD AUTO: 6.9 FL (ref 5–10.5)
RBC # BLD: 4.42 M/UL (ref 4–5.2)
WBC # BLD: 8.6 K/UL (ref 4–11)

## 2021-06-22 PROCEDURE — 97116 GAIT TRAINING THERAPY: CPT

## 2021-06-22 PROCEDURE — 2580000003 HC RX 258: Performed by: ORTHOPAEDIC SURGERY

## 2021-06-22 PROCEDURE — 85025 COMPLETE CBC W/AUTO DIFF WBC: CPT

## 2021-06-22 PROCEDURE — 97112 NEUROMUSCULAR REEDUCATION: CPT

## 2021-06-22 PROCEDURE — 6360000002 HC RX W HCPCS: Performed by: ORTHOPAEDIC SURGERY

## 2021-06-22 PROCEDURE — 97162 PT EVAL MOD COMPLEX 30 MIN: CPT

## 2021-06-22 PROCEDURE — 6370000000 HC RX 637 (ALT 250 FOR IP): Performed by: ANESTHESIOLOGY

## 2021-06-22 PROCEDURE — 2700000000 HC OXYGEN THERAPY PER DAY

## 2021-06-22 PROCEDURE — 94761 N-INVAS EAR/PLS OXIMETRY MLT: CPT

## 2021-06-22 PROCEDURE — 97166 OT EVAL MOD COMPLEX 45 MIN: CPT

## 2021-06-22 PROCEDURE — 6370000000 HC RX 637 (ALT 250 FOR IP): Performed by: ORTHOPAEDIC SURGERY

## 2021-06-22 PROCEDURE — 97535 SELF CARE MNGMENT TRAINING: CPT

## 2021-06-22 PROCEDURE — 36415 COLL VENOUS BLD VENIPUNCTURE: CPT

## 2021-06-22 PROCEDURE — 1200000000 HC SEMI PRIVATE

## 2021-06-22 PROCEDURE — 97110 THERAPEUTIC EXERCISES: CPT

## 2021-06-22 PROCEDURE — 99222 1ST HOSP IP/OBS MODERATE 55: CPT | Performed by: PHYSICIAN ASSISTANT

## 2021-06-22 PROCEDURE — 97530 THERAPEUTIC ACTIVITIES: CPT

## 2021-06-22 RX ADMIN — ASPIRIN 325 MG: 325 TABLET, COATED ORAL at 08:45

## 2021-06-22 RX ADMIN — GABAPENTIN 300 MG: 300 CAPSULE ORAL at 20:18

## 2021-06-22 RX ADMIN — ACETAMINOPHEN 650 MG: 325 TABLET ORAL at 13:43

## 2021-06-22 RX ADMIN — CITALOPRAM HYDROBROMIDE 40 MG: 20 TABLET ORAL at 20:19

## 2021-06-22 RX ADMIN — DOCUSATE SODIUM 50MG AND SENNOSIDES 8.6MG 1 TABLET: 8.6; 5 TABLET, FILM COATED ORAL at 20:18

## 2021-06-22 RX ADMIN — GABAPENTIN 300 MG: 300 CAPSULE ORAL at 08:44

## 2021-06-22 RX ADMIN — ACETAMINOPHEN 650 MG: 325 TABLET ORAL at 20:19

## 2021-06-22 RX ADMIN — NIFEDIPINE 60 MG: 30 TABLET, FILM COATED, EXTENDED RELEASE ORAL at 20:18

## 2021-06-22 RX ADMIN — CLONAZEPAM 2 MG: 1 TABLET ORAL at 20:19

## 2021-06-22 RX ADMIN — CEFAZOLIN 3000 MG: 10 INJECTION, POWDER, FOR SOLUTION INTRAVENOUS at 02:04

## 2021-06-22 RX ADMIN — ACETAMINOPHEN 650 MG: 325 TABLET ORAL at 04:16

## 2021-06-22 RX ADMIN — ACETAMINOPHEN 650 MG: 325 TABLET ORAL at 08:44

## 2021-06-22 RX ADMIN — LOSARTAN POTASSIUM 50 MG: 25 TABLET, FILM COATED ORAL at 20:18

## 2021-06-22 RX ADMIN — OXYCODONE HYDROCHLORIDE 10 MG: 5 TABLET ORAL at 17:58

## 2021-06-22 RX ADMIN — GABAPENTIN 300 MG: 300 CAPSULE ORAL at 13:43

## 2021-06-22 RX ADMIN — ASPIRIN 325 MG: 325 TABLET, COATED ORAL at 20:17

## 2021-06-22 RX ADMIN — DOCUSATE SODIUM 50MG AND SENNOSIDES 8.6MG 1 TABLET: 8.6; 5 TABLET, FILM COATED ORAL at 08:45

## 2021-06-22 RX ADMIN — TRAZODONE HYDROCHLORIDE 100 MG: 100 TABLET ORAL at 20:19

## 2021-06-22 RX ADMIN — ATORVASTATIN CALCIUM 20 MG: 10 TABLET, FILM COATED ORAL at 20:18

## 2021-06-22 RX ADMIN — CELECOXIB 100 MG: 100 CAPSULE ORAL at 20:18

## 2021-06-22 RX ADMIN — CELECOXIB 100 MG: 100 CAPSULE ORAL at 08:45

## 2021-06-22 RX ADMIN — SODIUM CHLORIDE: 9 INJECTION, SOLUTION INTRAVENOUS at 02:05

## 2021-06-22 RX ADMIN — OMEPRAZOLE 20 MG: 20 CAPSULE, DELAYED RELEASE ORAL at 20:18

## 2021-06-22 ASSESSMENT — PAIN DESCRIPTION - PAIN TYPE
TYPE: SURGICAL PAIN
TYPE: SURGICAL PAIN

## 2021-06-22 ASSESSMENT — PAIN DESCRIPTION - FREQUENCY: FREQUENCY: INTERMITTENT

## 2021-06-22 ASSESSMENT — PAIN SCALES - GENERAL
PAINLEVEL_OUTOF10: 0
PAINLEVEL_OUTOF10: 0
PAINLEVEL_OUTOF10: 3
PAINLEVEL_OUTOF10: 0
PAINLEVEL_OUTOF10: 2
PAINLEVEL_OUTOF10: 4
PAINLEVEL_OUTOF10: 9

## 2021-06-22 ASSESSMENT — PAIN DESCRIPTION - ONSET: ONSET: UNABLE TO TELL

## 2021-06-22 ASSESSMENT — PAIN DESCRIPTION - ORIENTATION
ORIENTATION: LEFT
ORIENTATION: LEFT

## 2021-06-22 ASSESSMENT — PAIN DESCRIPTION - LOCATION
LOCATION: KNEE
LOCATION: KNEE

## 2021-06-22 ASSESSMENT — PAIN - FUNCTIONAL ASSESSMENT: PAIN_FUNCTIONAL_ASSESSMENT: PREVENTS OR INTERFERES SOME ACTIVE ACTIVITIES AND ADLS

## 2021-06-22 NOTE — PROGRESS NOTES
Patient resting in bed watching TV, no further complaints, pain med effective. Patient able to make needs know.

## 2021-06-22 NOTE — PROGRESS NOTES
Patient complain of LLE pain due to knee replacement pain rated 9/10. Oxycodone 10 mg PO given at this time. Call light in reach.

## 2021-06-22 NOTE — PROGRESS NOTES
Inpatient Physical Therapy Daily Treatment Note    Unit: [facility]  Date:  6/22/2021  Patient Name:    Ki Schaeffer  Admitting diagnosis:  Primary osteoarthritis of left knee [M17.12]  Admit Date:  6/21/2021  Precautions/Restrictions:  Fall risk, Bed/chair alarm, Lines -IV and Purewick catheter, WB Restrictions (FWB on the LLE) and Knee immobilizer, can get up only from raised chairs. Discharge Recommendations: SNF  DME needs for discharge: defer to facility       Therapy recommendation for EMS Transport: requires transport by cot due to requires transport by cot due to need of raised sitting surface to get up, unable to get up from wheelchairt sitting surface. Therapy recommendations for staff:   Assist of 2 with use of standard walker (SW), knee immobilizer on the L LE and gait belt for all transfers and ambulation to/from chair  to/from bathroom  within room   Note: Patient can get up from raised sitting surface and raised bed, remind patient to push up from the arms while getting up. History of Present Illness: 56 y. o. female with a PMH of HTN, Anxiety, Depression, ESTHER, Morbid Obesity, s/p laparoscopic sleeve gastrectomy who presents for elective left total knee arthoplasty by Dr. Kanwal Sierra 6/21/2021. Underwent surgery uneventfully and was transferred to floor for further post operative management. Pt mentions to me that pain is controlled well  and worsens with movement. Home Health S4 Level Recommendation: Level 3 Safety  AM-PAC Mobility Score   AM-PAC Inpatient Mobility Raw Score : 16       Treatment Time: 5627 - 8056  Treatment number: 2  Timed Code Treatment Minutes: 60 minutes  Total Treatment Minutes:  60  minutes    Cognition    A&O x4   Able to follow 2 step commands    Subjective  Patient lying supine in bed with no family present   Pt agreeable to this PT tx.      Pain   Yes  Location: L knee (back of the knee)  Rating:    moderate/10  Pain Medicine Status: RN notified     Bed Mobility using bed rails  Supine to Sit:    Min A   Sit to Supine:   Min A   Rolling:   Min A   Scooting:   Min A     Transfer Training     Sit to stand: Mod A to Min A from raised surface with SW  Stand to sit:   Min A   Bed to Chair:   Min A  with use of gait belt, standard walker (SW) and knee immobilizer on left    Gait Training gait completed as indicated below  Distance:      40 ft  Deviations (firm surface/linoleum):  decreased amada, increased CHINO, decreased step length bilaterally and decreased stance time bilaterally  Assistive Device Used:    gait belt, rolling walker (RW) and knee immobilizer on left  Level of Assist:    CGA  Comment:     Stair Training deferred, pt unsafe/not appropriate to complete stairs at this time  # of Steps:   N/A  Level of Assist:  Not Tested  UE Support:  NA  Assistive Device:  N/A  Pattern:   N/A  Comments:     Therapeutic Exercise all completed bilaterally unless indicated  Ankle Pumps x 10 reps  Quad sets x 10 reps  Heel slides x 10 reps (with active assisted ROM)  LAQ x 10 reps  SLR x 10 reps (with active assisted ROM)  marching x 10 reps  Hip abduction: x 10 reps   Single leg bridging on the RLE x 10 reps. Balance  Sitting:  Fair ; CGA  Comments:     Standing: Fair -; CGA  Comments: ~5 min    Patient Education      Role of PT, POC, Discharge recommendations, DC recommendations, safety awareness, transfer techniques, pursed lip breathing, energy conservation, pacing activity and calling for assist with mobility. Positioning Needs       Pt in bed, alarm set, positioned in proper neutral alignment and pressure relief provided. Call light provided and all needs within reach  Ice provided and instructed in proper off/on schedule    ROM Measurements N/A  Knee Flexion: 75 degrees  Knee Extension: 0 degrees    Activity Tolerance   Pt completed therapy session with Pain noted with standing and ambulation activities.     Other  N/A    Assessment :  Patient needed consistent verbal cueing for the sequencing of the task with 50% carryover effect. Nursing staff was shown with demonstration with patient regarding bed mobility, functional transfers and ambulation with RW with donning and doffing of the Knee immobilizer. Recommending SNF upon discharge as patient functioning well below baseline, demonstrates good rehab potential and unable to return home due to limited or no family support, inability to negotiate stairs to enter home/bedroom/bathroom, burden of care beyond caregiver ability, home environment not conducive to patient recovery and limited safety awareness. Goals (all goals ongoing unless otherwise indicated)  To be met in 3 visits:  1). Independent with LE Ex x 10 reps     To be met in 6 visits:  1). Supine to/from sit: Mod A   2). Sit to/from stand: Min A  from standard sitting surface. 3). Bed to chair: Min A   4). Gait: Ambulate 50 ft.  with  SBA and use of LRAD (least restrictive assistive device)  5). Tolerate B LE exercises 3 sets of 10-15 reps  6). Ascend/descend 2 steps with Mod A  with use of hand rail unilateral and LRAD (least restrictive assistive device)    Plan   Continue with plan of care. Signature: Jaci Roblero, MS PT, # [id number]    If patient discharges from this facility prior to next visit, this note will serve as the Discharge Summary.

## 2021-06-22 NOTE — PROGRESS NOTES
Department of Orthopedic Surgery  Physician Assistant   Progress Note    Subjective:     Post-Operative Day: 1 Status Post left Total Knee Arthroplasty  Systemic or Specific Complaints:Pain Control    Objective:     Patient Vitals for the past 24 hrs:   BP Temp Temp src Pulse Resp SpO2 Height Weight   06/22/21 0834 118/70 97.1 °F (36.2 °C) Oral 77 16 -- -- --   06/22/21 0434 136/84 97.5 °F (36.4 °C) Oral 80 16 93 % -- --   06/22/21 0014 104/74 97.2 °F (36.2 °C) Oral 71 16 94 % -- --   06/21/21 2046 -- -- -- -- -- 99 % -- --   06/21/21 2000 127/83 97.6 °F (36.4 °C) Oral 86 16 90 % -- --   06/21/21 1629 120/66 97.2 °F (36.2 °C) Oral 97 16 92 % -- --   06/21/21 1600 -- -- -- -- -- -- 5' 8\" (1.727 m) (!) 365 lb 6.4 oz (165.7 kg)   06/21/21 1533 (!) 141/92 96.6 °F (35.9 °C) Oral 88 16 94 % -- --   06/21/21 1523 121/78 -- -- 89 -- -- -- --   06/21/21 1430 121/78 96.9 °F (36.1 °C) Oral 89 16 97 % -- --   06/21/21 1245 (!) 148/84 -- -- 85 12 93 % -- --   06/21/21 1230 (!) 168/91 97.9 °F (36.6 °C) Infrared 90 13 96 % -- --   06/21/21 1215 (!) 159/100 -- -- 89 14 97 % -- --   06/21/21 1210 (!) 159/100 -- -- 88 14 100 % -- --   06/21/21 1155 (!) 180/89 -- -- 90 14 96 % -- --   06/21/21 1150 (!) 167/100 -- -- 92 13 97 % -- --   06/21/21 1145 (!) 172/93 -- -- 92 14 (!) 84 % -- --   06/21/21 1140 (!) 165/98 97.3 °F (36.3 °C) Infrared 90 14 (!) 80 % -- --       General: alert, appears stated age and cooperative   Wound: Wound clean and dry no evidence of infection. , Wound Intact and Positive for Edema   Motion: Painful range of Motion   DVT Exam: No evidence of DVT seen on physical exam.       Knee swollen but thigh soft to palpation. Moving foot and ankle. Good distal pulses. Data Review  CBC:   Lab Results   Component Value Date    WBC 8.6 06/22/2021    RBC 4.42 06/22/2021    HGB 12.0 06/22/2021    HCT 36.3 06/22/2021     06/22/2021       Assessment:     Status Post left Total Knee Arthroplasty.  Doing well postoperatively. Plan:      1: Continues current post-op course : Will work with PT today. Has limited help at home so she may benefit from short stay at SNF if she qualifies. We'll see how she does today.   2:  Continue Deep venous thrombosis prophylaxis  3:  Continue physical therapy  4:  Continue Pain Control

## 2021-06-22 NOTE — PROGRESS NOTES
Blood pressure 104/74, pulse 71, temperature 97.2 °F (36.2 °C), temperature source Oral, resp. rate 16, height 5' 8\" (1.727 m), weight (!) 365 lb 6.4 oz (165.7 kg), SpO2 94 %, not currently breastfeeding. Patient states her pain level is good at this time. Maybe rate it as a 2. Scheduled tylenol given per order. New ice bag applied to left knee. Patient states she was able to rest well tonight. Will continue to monitor.   Electronically signed by Norma Worthington RN on 6/22/2021 at 4:18 AM

## 2021-06-22 NOTE — PROGRESS NOTES
Blood pressure 127/83, pulse 86, temperature 97.6 °F (36.4 °C), temperature source Oral, resp. rate 16, height 5' 8\" (1.727 m), weight (!) 365 lb 6.4 oz (165.7 kg), SpO2 90 %, not currently breastfeeding. Patient alert oriented x 4. Denies pain at this time. Ice on knee. Knee mobilizer on per order. No warmth to touch. Cap refill in left toes < 3 sec. Patient very sleepy. Lung sounds diminished. Some coughing noted at times. Oxygen level was 87% o2 increased to 4L now oxygen level 90%. Will continue to monitor patient.      Electronically signed by Bean Taylor RN on 6/21/2021 at 8:13 PM

## 2021-06-22 NOTE — PROGRESS NOTES
Inpatient Physical Therapy Evaluation and Treatment    Unit: [facility]  Date:  6/22/2021  Patient Name:    Dileep Yañez  Admitting diagnosis:  Primary osteoarthritis of left knee [M17.12]  Admit Date:  6/21/2021  Precautions/Restrictions/WB Status/ Lines/ Wounds/ Oxygen: Fall risk, Bed/chair alarm, Lines -IV and Purewick catheter, WB Restrictions (FWB on the LLE) and Knee immobilizer, can get up only from raised chairs. Treatment Time: 8298 - 2341  Treatment Number:  1   Timed Code Treatment Minutes: 72 minutes  Total Treatment Minutes:  82  minutes    Patient Goals for Therapy: \" Go to Rehab \"          Discharge Recommendations: SNF  DME needs for discharge: defer to facility       Therapy recommendation for EMS Transport: requires transport by cot due to need of raised sitting surface to get up, unable to get up from wheelchairt sitting surface. Therapy recommendations for staff:   Assist of 2 with use of standard walker (SW), knee immobilizer on the L LE and gait belt for all transfers and ambulation to/from chair  to/from bathroom  within room   Note: Patient can get up from raised sitting surface and raised bed, remind patient to push up from the arms while getting up. History of Present Illness: 56 y. o. female with a PMH of HTN, Anxiety, Depression, ESTHER, Morbid Obesity, s/p laparoscopic sleeve gastrectomy who presents for elective left total knee arthoplasty by Dr. Reddy Rizzo 6/21/2021. Underwent surgery uneventfully and was transferred to floor for further post operative management. Pt mentions to me that pain is controlled well  and worsens with movement. Home Health S4 Level Recommendation:  Level 3 Safety  AM-PAC Mobility Score    AM-PAC Inpatient Mobility Raw Score : 12       Preadmission Environment    Pt. Lives Alone, Sister can stay with her till July 2nd, 2021 and then daughter can present.   Home environment:  one story home  Steps to enter first floor: 2 steps to enter hold on to door handle and L sided railing only accessible at lower step  Steps to second floor: N/A  Bathroom: walk in shower, comfort height toilet and built in shower seat  Equipment owned: RW, wc (manual ), lift chair and had CPAP machine but did not have anymore since she was not comfortable to use. Preadmission Status:  Pt. Able to drive: Yes  Pt Fully independent with ADLs: Yes  Pt. Required assistance from family for: Independent PTA  Pt. independent for transfers and gait and walked with No Device, groceries delivered at home. Patient can not stand more than 2 min due to low back pain  History of falls Yes 5 falls in last 1 yr, recent 2 falls due to buckling of the knee and was able to get up by herself. Pain   Yes  Location: L knee  Ratin /10  Pain Medicine Status: Received pain med prior to tx    Cognition    A&O x4   Able to follow 2 step commands    Subjective  Patient lying supine in bed with no family present. Pt agreeable to this PT eval & tx. Upper Extremity ROM/Strength  Please see OT evaluation. Lower Extremity ROM / Strength   AROM WFL: Yes  ROM limitations: L knee flexion 72 degrees, L knee extension -2 degrees. Strength Assessment (measured on a 0-5 scale):  R LE   Quad   3+   Ant Tib  4   Hamstring 3-   Iliopsoas 3  L LE  Quad   3-   Ant Tib  3+   Hamstring 3-   Iliopsoas 3-    Lower Extremity Sensation    WFL    Lower Extremity Proprioception:   WFL    Coordination and Tone  WFL    Balance  Sitting:  Fair -; CGA  Comments: Patient sits with posterior trunk lean for maintaining balance within CHINO. Standing: Poor+; Max A  and 2 persons  Comments: Patient improved from Max A x 2 to Min A x 1 from raised sitting surface. Bed Mobility   Supine to Sit:    Max A  and 2 persons  Sit to Supine:   Not Tested  Rolling:   Not Tested  Scooting in sitting: CGA  Scooting in supine:  Not Tested    Transfer Training     Sit to stand:    Max A  and 2 persons unsuccessful with SW and STEDY from standard bed height; from raised bed, raised reclined sitting surface and raised BSC Min A x 1 with SW.   Stand to sit:   Mod A   Bed to Chair:   Min A  with use of gait belt, standard walker (SW) and knee immobilizer on left, patient needed consistent cueing for anterior trunk lean, sequencing for pushing on the arms, assist for clearing the LLE while standing up. Patient needed Min A from raised sitting surface but was unsuccessful to perform standing with standard sitting height. Gait gait completed as indicated below  Distance:      40 ft  Deviations (firm surface/linoleum):  decreased amada, increased CHINO, antalgic pattern, decreased step length on left and decreased stance time on left  Assistive Device Used:    gait belt, rolling walker (RW) and knee immobilizer on left  Level of Assist:    CGA  Comment:     Stair Training deferred, pt unsafe/ not appropriate to complete stairs at this time    Activity Tolerance   Pt completed therapy session with Pain noted with all functional mobility along with increased anxiety with functional transfers. At rest  SpO2: 94% on room air  BP: 135/86. HR: 79 bpm    After bed to chair transfers:   BP: 103/63  HR: 72 bpm  SpO2: 95% on room air. Positioning Needs   Pt reclined in chair, alarm set, positioned in proper neutral alignment and pressure relief provided. Call light provided and all needs within reach  Ice provided and instructed in proper off/on schedule    Exercises Initiated  all completed bilaterally unless indicated  Ankle Pumps x 10 reps  Quad sets x 10 reps  Heel slides x 10 reps  LAQ x 10 reps  SLR x 10 reps AAROM bilaterally. marching x 10 reps    Other  None. Patient/Family Education   Pt educated on role of inpatient PT, POC, importance of continued activity, DC recommendations, safety awareness, transfer techniques, pursed lip breathing, energy conservation, pacing activity and calling for assist with mobility.     Assessment  Pt

## 2021-06-22 NOTE — PROGRESS NOTES
Inpatient Occupational Therapy  Evaluation and Treatment    Unit: [facility]  Date:  6/22/2021  Patient Name:    Elda Sibley  Admitting diagnosis:  Primary osteoarthritis of left knee [M17.12]  Admit Date:  6/21/2021  Precautions/Restrictions/WB Status/ Lines/ Wounds/ Oxygen: fall risk, IV and WB restrictions (L LE WBAT)   KI donned when standing    Treatment Time: 9:06-10:40  Treatment Number: 1     Billable Treatment Time: 84 minutes   Total Treatment Time:  94   minutes    Patient Goals for Therapy:  \" to go to rehab \"      Discharge Recommendations: SNF  DME needs for discharge: defer to facility       Therapy recommendations for staff:   Assist of 2 with use of standard walker (SW) for all ambulation to/from bathroom    History of Present Illness: 56 y.o. female with a PMH of HTN, Anxiety, Depression, ESTHER, Morbid Obesity, s/p laparoscopic sleeve gastrectomy who presents for elective left total knee arthoplasty by Dr. Asuncion Hatch on 6/21/2021. Underwent surgery uneventfully and was transferred to floor for further post operative management. Pt mentions to me that pain is controlled well  and worsens with movement. Home Health S4 Level Recommendation:  Level 3 Safety  AM-PAC Score: AM-PAC Inpatient Daily Activity Raw Score: 15    Preadmission Environment    Pt. Lives Alone, Sister can stay with her till July 2nd, 2021 and then daughter can present. Home environment:    one story home  Steps to enter first floor: 2 steps to enter hold on to door handle and L sided railing only accessible at lower step  Steps to second floor: N/A  Bathroom: walk in shower, comfort height toilet and built in shower seat  Equipment owned: RW, wc (manual ), lift chair and had CPAP machine but not anymore was not comfortable with it     Preadmission Status:  Pt. Able to drive: Yes  Pt Fully independent with ADLs: Yes  Pt. Required assistance from family for:  Independent PTA  Pt. independent for transfers and gait and walked with No Device, groceries delivered at home. Patient can not stand more than 2 min due to low back pain  History of falls Yes 5 falls in last 1 yr, recent 2 falls due to buckling of the knee and was able to get up by herself.     Pain   Yes  Location: L knee  Ratin /10  Pain Medicine Status: Received pain med prior to tx     Cognition    A&O x4   Able to follow 2 step commands    Subjective  Patient lying supine in bed with no family present. Pt agreeable to this OT eval & tx. Upper Extremity ROM:    WFL,  pt able to perform all bed mobility, transfers, and gait without ROM limitation. Upper Extremity Strength:    BUE strength impaired but not formally assessed w/ MMT    Upper Extremity Sensation    WFL    Upper Extremity Proprioception:  WFL    Coordination and Tone  Diminished    Balance  Functional Sitting Balance:  WFL  Functional Standing Balance:Impaired    Bed mobility:    Supine to sit:   Mod A  Sit to supine:   Not Tested  Rolling:    Not Tested  Scooting in sitting:  Min A  Scooting to head of bed:   Not Tested    Bridging:   Not Tested    Transfers:    Sit to stand: Max A of 2 initially from edge of bed due to fear of pain. Attempted STEDY but unable to clear paddles. Patient able to stand with SW with min A of 2 with bed elevated. Able to stand from chair and BSC with min A of 1. Stand to sit:  Min A  Bed to chair:   Min A and with use of RW  BSC over Standard toilet: Min A and with use of RW    Dressing:      UE:   Not Tested  LE:    Total A     Bathing:    UE:  Not Tested  LE:  Not Tested    Eating:   Not Tested    Toileting:  N/A    Activity Tolerance   Pt completed therapy session with Pain noted with position changes  SpO2: 94% on room air  BP: 135/86, BP after transfer to chair 105/58  HR: 79 bpm    Positioning Needs:   Up in chair, LE elevated to promote extension, Cold pack applied.    Alarm Set    Exercise / Activities Initiated:   N/A    Patient/Family Education:   Role of OT  Recommendations for DC  Safe RW use/hand placement    Assessment of Deficits: Pt seen for Occupational therapy evaluation in acute care setting. Pt demonstrated decreased Activity tolerance, ADLs, IADLs, Balance , Bathing, Bed mobility, Dressing, ROM, Safety Awareness, Strength, Transfers and Coping Skills. Pt functioning below baseline and will likely benefit from skilled occupational therapy services to maximize safety and independence. Goal(s) : To be met in 3 Visits:  1). Bed to toilet/BSC: CGA    To be met in 5 Visits:  1). Supine to/from Sit:  Min A  2). Upper Body Bathing:   SBA  3). Lower Body Bathing: Mod A  4). Upper Body Dressing:  SBA  5). Lower Body Dressing: Mod A  6). Pt to demonstrate UE exs x 15 reps with minimal cues    Rehabilitation Potential:  Good for goals listed above. Strengths for achieving goals include: Pt motivated, PLOF and Pt cooperative  Barriers to achieving goals include:  Complexity of condition     Plan: To be seen 3-5 x/wk while in acute care setting for therapeutic exercises, bed mobility, transfers, dressing, bathing, family/patient education, ADL/IADL retraining, energy conservation training.        Alexa Weeks OTR/L #[id number]      If patient discharges from this facility prior to next visit, this note will serve as the Discharge Summary

## 2021-06-22 NOTE — PLAN OF CARE
Problem: Pain:  Goal: Pain level will decrease  Description: Pain level will decrease  Outcome: Ongoing     Problem: Pain:  Goal: Control of acute pain  Description: Control of acute pain  Outcome: Ongoing   Patient on scheduled Tylenol, and patient voices no pain.

## 2021-06-22 NOTE — PROGRESS NOTES
Patient back to bed with assist x 2. Patient used walker and transfered from chair to bed without difficulty. Pt resting in bed quietly at this time. Denies any needs. All needs met and call light within reach.

## 2021-06-22 NOTE — CONSULTS
Consult note     Gerardo Collins;  MRN: [id number] ; Admit Date: 6/21/2021  7:23 AM   Current Date and Time: 6/22/2021 9:27 AM    PCP  --  Leo Torres MD         Reason for Consultation - Medical Management during hospitalization    HPI:   Patient is a 56 y.o. female with a PMH of HTN, Anxiety, Depression, ESTHER, Morbid Obesity, s/p laparoscopic sleeve gastrectomy who presents for elective left total knee arthoplasty by Dr. Latisha Tavera on 6/21/2021. Underwent surgery uneventfully and was transferred to floor for further post operative management. Pt mentions to me that pain is controlled well  and worsens with movement. Denies any chest pain, sob or dizziness. No fevers noted. Past Medical History:   Diagnosis Date    Anxiety     with panic attacks    Arthritis     knees    Chronic back pain     Dental crowns present     upper left dental implant    Depression     Hyperlipidemia     borderline    Hypertension     Hypotension     Her Father after anesthesia    IBS (irritable bowel syndrome)     with rectal bleeding    Knee osteoarthritis 1/25/2013    Morbid obesity ([address]     Positive H. pylori test 12/10/12    Prolonged emergence from general anesthesia       Past Surgical History:   Procedure Laterality Date    CHOLECYSTECTOMY  2008    COLONOSCOPY      due age 46    COLONOSCOPY  12/10/12    FOOT FRACTURE SURGERY      FOOT SURGERY  11/2011    Right peroneal tendon Milli Wolfe); ACP peroneal tendons with US guided injection    JOINT REPLACEMENT Right 2013    knee    KNEE ARTHROSCOPY Left 10/13/2017    Left knee DVA with partial medial menisectomy and loose body removal; internal fixation of insuff.  Fx's of the MFT/MTP with bone substitute    SLEEVE GASTRECTOMY  12/08/2017    LAPAROSCOPIC SLEEVE GASTRECTOMY WITH HIATAL HERNIA REPAIR    TONSILLECTOMY  1963    TOTAL KNEE ARTHROPLASTY Left 06/21/2021    UPPER GASTROINTESTINAL ENDOSCOPY  12/10/2012    gastritis      Medications Prior to Admission: NIFEdipine (PROCARDIA XL) 60 MG extended release tablet, TAKE ONE TABLET BY MOUTH DAILY (Patient taking differently: Take 60 mg by mouth nightly )  losartan (COZAAR) 50 MG tablet, TAKE ONE TABLET BY MOUTH DAILY (Patient taking differently: Take 50 mg by mouth nightly )  mupirocin (BACTROBAN) 2 % ointment, APPLY 1/2 INCH TO INSIDE OF EACH NOSTRIL Q 12 HR STARTING 5 DAYS PRIOR TO SURGERY INCLUDING MORNING OF SURGERY. omeprazole (PRILOSEC) 20 MG delayed release capsule, TAKE ONE CAPSULE BY MOUTH EVERY MORNING BEFORE BREAKFAST (Patient taking differently: Take 20 mg by mouth nightly )  atorvastatin (LIPITOR) 20 MG tablet, TAKE ONE TABLET BY MOUTH DAILY (Patient taking differently: Take 20 mg by mouth nightly )  citalopram (CELEXA) 40 MG tablet, Take 40 mg by mouth nightly   traZODone (DESYREL) 50 MG tablet, Take 100 mg by mouth nightly   clonazepam (KLONOPIN) 2 MG tablet, Take 2 mg by mouth nightly   No Known Allergies   Social History     Tobacco Use    Smoking status: Former Smoker     Packs/day: 1.50     Years: 21.00     Pack years: 31.50     Quit date: 2015     Years since quittin.5    Smokeless tobacco: Never Used    Tobacco comment: still chews nicotine gum 2mg (20 pieces per day)   Substance Use Topics    Alcohol use: No     Alcohol/week: 0.0 standard drinks      Family History   Problem Relation Age of Onset    Cancer Mother         pancreatic cancer    High Cholesterol Mother     Arthritis Mother     High Blood Pressure Father     Diabetes Father     Obesity Father     Other Father         gout, spinal stenosis    Arthritis Sister     Arthritis Maternal Grandmother     Substance Abuse Maternal Grandfather     High Blood Pressure Paternal Grandmother         Objective:     VS: Temp: 98.8 °F (37.1 °C)  Pulse: 76  BP: 115/76 (right arm and 115/72 left arm)  SpO2: 95 %  O2 Flow Rate (L/min): 6 L/min        Physical Exam:  General:  Awake, alert and oriented.  Appears to Gastrectomy    GERD  - cont Prilosec    Morbid Obesity  - Body mass index is 55.56 kg/m². - Complicating assessment and treatment. Placing patient at risk for multiple co-morbidities as well as early death and contributing to the patient's presentation.   - Counseled on weight loss.      DVT Prophylaxis: ASA  Diet: Adult Diet; regular  Code status: Full    MATILDE Conroy PA-C 9:39 AM 6/22/2021

## 2021-06-23 ENCOUNTER — APPOINTMENT (OUTPATIENT)
Dept: GENERAL RADIOLOGY | Age: 61
DRG: 469 | End: 2021-06-23
Attending: ORTHOPAEDIC SURGERY
Payer: COMMERCIAL

## 2021-06-23 LAB
ANION GAP SERPL CALCULATED.3IONS-SCNC: 10 MMOL/L (ref 3–16)
BASE EXCESS ARTERIAL: 2.3 MMOL/L (ref -3–3)
BASOPHILS ABSOLUTE: 0 K/UL (ref 0–0.2)
BASOPHILS RELATIVE PERCENT: 0.5 %
BUN BLDV-MCNC: 11 MG/DL (ref 7–20)
CALCIUM SERPL-MCNC: 8.7 MG/DL (ref 8.3–10.6)
CARBOXYHEMOGLOBIN ARTERIAL: 0.5 % (ref 0–1.5)
CHLORIDE BLD-SCNC: 104 MMOL/L (ref 99–110)
CO2: 23 MMOL/L (ref 21–32)
CREAT SERPL-MCNC: 0.7 MG/DL (ref 0.6–1.2)
D DIMER: 1218 NG/ML DDU (ref 0–229)
EOSINOPHILS ABSOLUTE: 0 K/UL (ref 0–0.6)
EOSINOPHILS RELATIVE PERCENT: 0.6 %
GFR AFRICAN AMERICAN: >60
GFR NON-AFRICAN AMERICAN: >60
GLUCOSE BLD-MCNC: 113 MG/DL (ref 70–99)
HCO3 ARTERIAL: 28.7 MMOL/L (ref 21–29)
HCT VFR BLD CALC: 32.9 % (ref 36–48)
HEMOGLOBIN, ART, EXTENDED: 11.4 G/DL (ref 12–16)
HEMOGLOBIN: 11 G/DL (ref 12–16)
LYMPHOCYTES ABSOLUTE: 1.8 K/UL (ref 1–5.1)
LYMPHOCYTES RELATIVE PERCENT: 27.7 %
MCH RBC QN AUTO: 27.4 PG (ref 26–34)
MCHC RBC AUTO-ENTMCNC: 33.4 G/DL (ref 31–36)
MCV RBC AUTO: 82.2 FL (ref 80–100)
METHEMOGLOBIN ARTERIAL: 0.3 %
MONOCYTES ABSOLUTE: 1 K/UL (ref 0–1.3)
MONOCYTES RELATIVE PERCENT: 15.4 %
NEUTROPHILS ABSOLUTE: 3.7 K/UL (ref 1.7–7.7)
NEUTROPHILS RELATIVE PERCENT: 55.8 %
O2 CONTENT ARTERIAL: 14 ML/DL
O2 SAT, ARTERIAL: 85.4 %
O2 THERAPY: ABNORMAL
PCO2 ARTERIAL: 53 MMHG (ref 35–45)
PDW BLD-RTO: 14.2 % (ref 12.4–15.4)
PH ARTERIAL: 7.35 (ref 7.35–7.45)
PLATELET # BLD: 234 K/UL (ref 135–450)
PMV BLD AUTO: 7.1 FL (ref 5–10.5)
PO2 ARTERIAL: 53 MMHG (ref 75–108)
POTASSIUM SERPL-SCNC: 4 MMOL/L (ref 3.5–5.1)
RBC # BLD: 4 M/UL (ref 4–5.2)
SODIUM BLD-SCNC: 137 MMOL/L (ref 136–145)
TCO2 ARTERIAL: 30.4 MMOL/L
WBC # BLD: 6.6 K/UL (ref 4–11)

## 2021-06-23 PROCEDURE — 97530 THERAPEUTIC ACTIVITIES: CPT

## 2021-06-23 PROCEDURE — 1200000000 HC SEMI PRIVATE

## 2021-06-23 PROCEDURE — 97116 GAIT TRAINING THERAPY: CPT

## 2021-06-23 PROCEDURE — 36415 COLL VENOUS BLD VENIPUNCTURE: CPT

## 2021-06-23 PROCEDURE — 99233 SBSQ HOSP IP/OBS HIGH 50: CPT | Performed by: INTERNAL MEDICINE

## 2021-06-23 PROCEDURE — 71046 X-RAY EXAM CHEST 2 VIEWS: CPT

## 2021-06-23 PROCEDURE — 82803 BLOOD GASES ANY COMBINATION: CPT

## 2021-06-23 PROCEDURE — 85025 COMPLETE CBC W/AUTO DIFF WBC: CPT

## 2021-06-23 PROCEDURE — 6370000000 HC RX 637 (ALT 250 FOR IP): Performed by: ORTHOPAEDIC SURGERY

## 2021-06-23 PROCEDURE — 80048 BASIC METABOLIC PNL TOTAL CA: CPT

## 2021-06-23 PROCEDURE — 85379 FIBRIN DEGRADATION QUANT: CPT

## 2021-06-23 PROCEDURE — 2700000000 HC OXYGEN THERAPY PER DAY

## 2021-06-23 PROCEDURE — 2580000003 HC RX 258: Performed by: ORTHOPAEDIC SURGERY

## 2021-06-23 PROCEDURE — 97110 THERAPEUTIC EXERCISES: CPT

## 2021-06-23 PROCEDURE — 6370000000 HC RX 637 (ALT 250 FOR IP): Performed by: ANESTHESIOLOGY

## 2021-06-23 PROCEDURE — 94761 N-INVAS EAR/PLS OXIMETRY MLT: CPT

## 2021-06-23 RX ORDER — KETOROLAC TROMETHAMINE 30 MG/ML
15 INJECTION, SOLUTION INTRAMUSCULAR; INTRAVENOUS EVERY 6 HOURS PRN
Status: DISCONTINUED | OUTPATIENT
Start: 2021-06-23 | End: 2021-06-26 | Stop reason: HOSPADM

## 2021-06-23 RX ADMIN — ACETAMINOPHEN 650 MG: 325 TABLET ORAL at 02:14

## 2021-06-23 RX ADMIN — CLONAZEPAM 2 MG: 1 TABLET ORAL at 20:18

## 2021-06-23 RX ADMIN — TRAZODONE HYDROCHLORIDE 100 MG: 100 TABLET ORAL at 20:17

## 2021-06-23 RX ADMIN — OXYCODONE HYDROCHLORIDE 10 MG: 5 TABLET ORAL at 02:14

## 2021-06-23 RX ADMIN — DOCUSATE SODIUM 50MG AND SENNOSIDES 8.6MG 1 TABLET: 8.6; 5 TABLET, FILM COATED ORAL at 20:17

## 2021-06-23 RX ADMIN — OMEPRAZOLE 20 MG: 20 CAPSULE, DELAYED RELEASE ORAL at 20:17

## 2021-06-23 RX ADMIN — ACETAMINOPHEN 650 MG: 325 TABLET ORAL at 15:31

## 2021-06-23 RX ADMIN — ASPIRIN 325 MG: 325 TABLET, COATED ORAL at 20:16

## 2021-06-23 RX ADMIN — ACETAMINOPHEN 650 MG: 325 TABLET ORAL at 08:57

## 2021-06-23 RX ADMIN — SODIUM CHLORIDE, PRESERVATIVE FREE 10 ML: 5 INJECTION INTRAVENOUS at 20:43

## 2021-06-23 RX ADMIN — CELECOXIB 100 MG: 100 CAPSULE ORAL at 08:57

## 2021-06-23 RX ADMIN — NIFEDIPINE 60 MG: 30 TABLET, FILM COATED, EXTENDED RELEASE ORAL at 20:17

## 2021-06-23 RX ADMIN — OXYCODONE HYDROCHLORIDE 10 MG: 5 TABLET ORAL at 08:57

## 2021-06-23 RX ADMIN — ATORVASTATIN CALCIUM 20 MG: 10 TABLET, FILM COATED ORAL at 20:17

## 2021-06-23 RX ADMIN — GABAPENTIN 300 MG: 300 CAPSULE ORAL at 08:57

## 2021-06-23 RX ADMIN — GABAPENTIN 300 MG: 300 CAPSULE ORAL at 15:32

## 2021-06-23 RX ADMIN — CITALOPRAM HYDROBROMIDE 40 MG: 20 TABLET ORAL at 20:17

## 2021-06-23 RX ADMIN — DOCUSATE SODIUM 50MG AND SENNOSIDES 8.6MG 1 TABLET: 8.6; 5 TABLET, FILM COATED ORAL at 08:57

## 2021-06-23 RX ADMIN — ASPIRIN 325 MG: 325 TABLET, COATED ORAL at 08:58

## 2021-06-23 RX ADMIN — SODIUM CHLORIDE, PRESERVATIVE FREE 10 ML: 5 INJECTION INTRAVENOUS at 09:03

## 2021-06-23 RX ADMIN — ACETAMINOPHEN 650 MG: 325 TABLET ORAL at 20:17

## 2021-06-23 RX ADMIN — GABAPENTIN 300 MG: 300 CAPSULE ORAL at 20:17

## 2021-06-23 RX ADMIN — OXYCODONE HYDROCHLORIDE 5 MG: 5 TABLET ORAL at 18:35

## 2021-06-23 RX ADMIN — LOSARTAN POTASSIUM 50 MG: 25 TABLET, FILM COATED ORAL at 20:16

## 2021-06-23 ASSESSMENT — PAIN SCALES - GENERAL
PAINLEVEL_OUTOF10: 9
PAINLEVEL_OUTOF10: 5
PAINLEVEL_OUTOF10: 0
PAINLEVEL_OUTOF10: 0
PAINLEVEL_OUTOF10: 5
PAINLEVEL_OUTOF10: 7

## 2021-06-23 NOTE — PROGRESS NOTES
Physical Therapy    Patient was approached for PM session of Physical therapy but patient was found sleeping. On asking about the session, patient refused due to fatigue and drowsiness. Patient verbalized understanding of the exercise program given to her today. Patient reported she had been doing the above mentioned exercises. Patient will be followed up later as schedule permits. No charge.   Ashley Marques, MS PT, # [id number]

## 2021-06-23 NOTE — PROGRESS NOTES
Patient has rested throughout the night, patient was on room air at beginning of shift, patient's oxygen saturation was 61% upon her second set of q4 vitals. Patient is now on 5L of 02 with an oxygen saturation > 90%. Patient did state she had sleep apnea but doesn't wear her c-pap at home. Pain meds gave as needed. Up with walker and assist to bathroom.

## 2021-06-23 NOTE — CARE COORDINATION
INTERDISCIPLINARY PLAN OF CARE CONFERENCE    Date/Time: 6/23/2021 3:53 PM  Completed by: Paris Anthony RN, Case Management      Patient Name:  David Cooper  YOB: 1960  Admitting Diagnosis: Primary osteoarthritis of left knee [M17.12]     Admit Date/Time:  6/21/2021  7:23 AM    Chart reviewed. Interdisciplinary team contacted or reviewed plan related to patient progress and discharge plans. Disciplines included Case Management, Nursing, and Dietitian. Current Status: Stable  PT/OT recommendation for discharge plan of care: SNF    Expected D/C Disposition:  Rehab  Confirmed plan with patient and/or family Yes confirmed with: (name) sister Moriah Jackson  Met with: patient   Discharge Plan Comments:  Reviewed chart and met with pt at bedside. Discussed therapy rec's for STR and pt is agreeable and chooses [facility]. Spoke with Remberto gonzalez at [facility] who after review states can accept at dc and has started pre cert.  Will cont to follow for additional needs    Home O2 in place on admit: No  Pt informed of need to bring portable home O2 tank on day of discharge for nursing to connect prior to leaving:  Not Indicated  Verbalized agreement/Understanding:  Not Indicated

## 2021-06-23 NOTE — PLAN OF CARE
Problem: Pain:  Goal: Pain level will decrease  Description: Pain level will decrease  6/22/2021 2228 by Kam Carter RN  Outcome: Ongoing  6/22/2021 1550 by Luisa Mccullough RN  Outcome: Ongoing  Goal: Control of acute pain  Description: Control of acute pain  6/22/2021 2228 by Kam Carter RN  Outcome: Ongoing  6/22/2021 1550 by Luisa Mccullough RN  Outcome: Ongoing  Goal: Control of chronic pain  Description: Control of chronic pain  Outcome: Ongoing     Problem: Discharge Planning:  Goal: Discharged to appropriate level of care  Description: Discharged to appropriate level of care  Outcome: Ongoing     Problem: Mobility - Impaired:  Goal: Mobility will improve  Description: Mobility will improve  Outcome: Ongoing     Problem: Infection - Surgical Site:  Goal: Will show no infection signs and symptoms  Description: Will show no infection signs and symptoms  Outcome: Ongoing     Problem: Pain - Acute:  Goal: Pain level will decrease  Description: Pain level will decrease  6/22/2021 2228 by Kam Carter RN  Outcome: Ongoing  6/22/2021 1550 by Luisa Mccullough RN  Outcome: Ongoing

## 2021-06-23 NOTE — PROGRESS NOTES
Occupational Therapy  Attempted to see patient this pm.  Patient refused, states she just got into bed and is fatigued and too cold to come out from under covers. States she is planning to go to SNF at d/c and will be agreeable to therapy tomorrow. Will continue to follow.   Yolanda Dennis, OTR/L [id number]

## 2021-06-23 NOTE — PROGRESS NOTES
When obtaining vital signs, patient's oxygen saturation was 61% on room air. 02 administered and is now on 5L to keep patient's oxygen saturation above 90%. Continuous pulse oximetry added as well.

## 2021-06-23 NOTE — PROGRESS NOTES
PM assessment completed, see flowsheet. Patient up to bathroom with walker and assist. No complaints or needs at this time. Call light within reach, bed in lowest position.

## 2021-06-23 NOTE — PROGRESS NOTES
Department of Orthopedic Surgery  Physician Assistant   Progress Note    Subjective:     Post-Operative Day: 2 Status Post left Total Knee Arthroplasty  Systemic or Specific Complaints:Pain Control about the same. Was sleepy after pain meds according to sister. No chest pain. No calf pain. No SOB currently. Objective:     Patient Vitals for the past 24 hrs:   BP Temp Temp src Pulse Resp SpO2   06/23/21 0845 117/67 98 °F (36.7 °C) Oral 83 18 93 %   06/23/21 0353 (!) 103/57 98.2 °F (36.8 °C) Oral 71 18 90 %   06/23/21 0015 -- -- -- -- -- 93 %   06/23/21 0007 (!) 107/58 97.1 °F (36.2 °C) Oral 98 18 (!) 61 %   06/22/21 2015 121/74 97.9 °F (36.6 °C) Oral 86 18 95 %   06/22/21 1410 107/70 97.9 °F (36.6 °C) Oral 75 16 91 %       General: alert, appears stated age and cooperative   Wound: Wound clean and dry no evidence of infection. , Wound Intact and Positive for Edema   Motion: Painful range of Motion   DVT Exam: No evidence of DVT seen on physical exam.       Knee swollen but thigh soft to palpation. Moving foot and ankle. Good distal pulses. Data Review  CBC:   Lab Results   Component Value Date    WBC 6.6 06/23/2021    RBC 4.00 06/23/2021    HGB 11.0 06/23/2021    HCT 32.9 06/23/2021     06/23/2021     D-Dimer: 1218    Assessment:     Status Post left Total Knee Arthroplasty. Doing well postoperatively. Plan:      1: Continues current post-op course : Had hypoxic episode and hypotension earlier according to notes and her sister. Seemed to correlate some with pain medication admisistration according to sister. Low suspicion for any PE or DVT at this time and although D-Dimer elevated I don't think it would be very reliable for PE eval at this time given recent surgery. IM managing the hypoxia/hypotension. No calf pain and negative Curtis's today on exam.  No tachycardia. Continue IS today and up as able. Continue to monitor.   2:  Continue Deep venous thrombosis prophylaxis - ASA  3: Continue physical therapy  4:  Continue Pain Control  5: Will plan for SNF when medically stable.

## 2021-06-23 NOTE — PROGRESS NOTES
Inpatient Physical Therapy Daily Treatment Note    Unit: [facility]  Date:  6/23/2021  Patient Name:    Dangelo Soriano  Admitting diagnosis:  Primary osteoarthritis of left knee [M17.12]  Admit Date:  6/21/2021  Precautions/Restrictions:  Lines -Supplemental O2 (3L/min) and Fall risk, Bed/chair alarm, Lines -IV and Purewick catheter, WB Restrictions (FWB on the LLE) and Knee immobilizer, can get up only from raised chairs. Discharge Recommendations: SNF (If declined will need home with 24hr assist with home PT)  DME needs for discharge: Bariatric RW       Therapy recommendation for EMS Transport: requires transport by cot due to requires transport by cot due to need of raised sitting surface to get up, unable to get up from wheelchairt sitting surface. Therapy recommendations for staff:   Assist of 2 with use of standard walker (SW), knee immobilizer on the L LE and gait belt for all transfers and ambulation to/from chair  to/from bathroom  within room   Note: Patient can get up from raised sitting surface and raised bed, remind patient to push up from the R arm with L arm resting on the SW while getting up. History of Present Illness: 56 y. o. female with a PMH of HTN, Anxiety, Depression, ESTHER, Morbid Obesity, s/p laparoscopic sleeve gastrectomy who presents for elective left total knee arthoplasty by Dr. Ngoc Beltre 6/21/2021. Underwent surgery uneventfully and was transferred to floor for further post operative management. Pt mentions to me that pain is controlled well  and worsens with movement. Home Health S4 Level Recommendation: Level 3 Safety  AM-PAC Mobility Score   AM-PAC Inpatient Mobility Raw Score : 16       Treatment Time: 9836 - 0051  Treatment number: 3  Timed Code Treatment Minutes: 71 minutes  Total Treatment Minutes: 71 minutes    Cognition    A&O x4   Able to follow 2 step commands    Subjective  Patient lying supine in bed with no family present   Pt agreeable to this PT tx. Pain   Yes  Location: L knee (back of the knee)  Rating:    3/10  Pain Medicine Status: Received pain med prior to tx     Bed Mobility using bed rails  Supine to Sit:    Min A  for clearing the LLE  Sit to Supine:   Not Tested  Rolling:   Min A   Scooting:   SBA in sitting and supine using bed rails. Transfer Training     Sit to stand: Mod A to Min A from raised surface with SW (Patient needed verbal cueing for sequencing of the task and pushing from the RUE while LUE resting on the SW)  Stand to sit:   CGA (Verbal cueing needed for hand and foot placement and for eccentric control)  Bed to Chair:   Min A  with use of gait belt, standard walker (SW) and knee immobilizer on left    Gait Training gait completed as indicated below  Distance:      8 ft  Deviations (firm surface/linoleum):  decreased amada, increased CHINO, decreased step length bilaterally and decreased stance time bilaterally  Assistive Device Used:    gait belt, rolling walker (RW) and knee immobilizer on left  Level of Assist:    SBA  Comment: Patient was limited in walking distance due to feeling of drowsiness and hypotensive response to standing to maintain safety. Stair Training deferred, pt unsafe/not appropriate to complete stairs at this time  # of Steps:   N/A  Level of Assist:  Not Tested  UE Support:  NA  Assistive Device:  N/A  Pattern:   N/A  Comments: Patient was hypotensive with ambulation hence stair ambulation could not be attempted. Therapeutic Exercise all completed bilaterally unless indicated  Ankle Pumps x 10 reps  Quad sets x 10 reps  Heel slides x 10 reps (with active assisted ROM)  LAQ x 10 reps  SLR x 10 reps (with active assisted ROM)  marching x 10 reps  Hip abduction: x 10 reps   Single leg bridging on the RLE x 10 reps.     Balance  Sitting:  Fair +; SBA  Comments:     Standing: Fair -; SBA  Comments: ~5 min    Patient Education      Role of PT, POC, Discharge recommendations, DC recommendations, safety awareness, transfer techniques, pursed lip breathing, energy conservation, pacing activity and calling for assist with mobility. Patient was provided with HEP program and was explained to her and her sister. Also patient and her sister had questions about safety during discharge at home. Physical therapist (writer of the note), explained the current physical condition of the patient, performance level and safety barriers at home with increased physical assistance needed at this point in detailed manner with demonstrations. Patient and her sister verbalized agreement. Positioning Needs       Pt in bed, alarm set, positioned in proper neutral alignment and pressure relief provided. Call light provided and all needs within reach  Ice provided and instructed in proper off/on schedule    ROM Measurements N/A  Knee Flexion: 70 degrees  Knee Extension: 0 degrees    Activity Tolerance   Pt completed therapy session with No adverse symptoms noted w/activity. (Patient was drowsy during the session today)  Supine  SpO2: 83% on room air (trial for weaning), had to put patient back on 3L/min of O2, recovered to 95% within 1 min  BP: 95/61  HR: 88 bpm    Sitting  SpO2: 95% on 3L/min of O2  BP: 103/70  HR:91    Standing and ambulation of 8 ft including bed to chair transfers  SpO2: 89% on 3L/min of O2  BP: 88/65  HR: 91 - 136 bpm (fluctuating)    RN notified about the treatment response. Other  N/A    Assessment :  Patient needed consistent verbal cueing for the sequencing of the task with 50% carryover effect. Patient was hypotensive today with standing and ambulation activities.      Recommending SNF upon discharge as patient functioning well below baseline, demonstrates good rehab potential and unable to return home due to limited or no family support, inability to negotiate stairs to enter home/bedroom/bathroom, burden of care beyond caregiver ability, home environment not conducive to patient recovery and limited safety awareness. Goals (all goals ongoing unless otherwise indicated)  To be met in 3 visits:  1). Independent with LE Ex x 10 reps     To be met in 6 visits:  1). Supine to/from sit: Mod A   2). Sit to/from stand: Min A  from standard sitting surface. 3). Bed to chair: Min A   4). Gait: Ambulate 50 ft.  with  SBA and use of LRAD (least restrictive assistive device)  5). Tolerate B LE exercises 3 sets of 10-15 reps  6). Ascend/descend 2 steps with Mod A  with use of hand rail unilateral and LRAD (least restrictive assistive device)    Plan   Continue with plan of care. Signature: Jaswant Kumar MS PT, # [id number]    If patient discharges from this facility prior to next visit, this note will serve as the Discharge Summary.

## 2021-06-24 ENCOUNTER — APPOINTMENT (OUTPATIENT)
Dept: CT IMAGING | Age: 61
DRG: 469 | End: 2021-06-24
Attending: ORTHOPAEDIC SURGERY
Payer: COMMERCIAL

## 2021-06-24 LAB
BASOPHILS ABSOLUTE: 0.1 K/UL (ref 0–0.2)
BASOPHILS RELATIVE PERCENT: 0.9 %
EOSINOPHILS ABSOLUTE: 0.2 K/UL (ref 0–0.6)
EOSINOPHILS RELATIVE PERCENT: 3.3 %
GLUCOSE BLD-MCNC: 104 MG/DL (ref 70–99)
HCT VFR BLD CALC: 35.5 % (ref 36–48)
HEMOGLOBIN: 11.6 G/DL (ref 12–16)
LYMPHOCYTES ABSOLUTE: 1.6 K/UL (ref 1–5.1)
LYMPHOCYTES RELATIVE PERCENT: 25.9 %
MCH RBC QN AUTO: 27.2 PG (ref 26–34)
MCHC RBC AUTO-ENTMCNC: 32.8 G/DL (ref 31–36)
MCV RBC AUTO: 83.2 FL (ref 80–100)
MONOCYTES ABSOLUTE: 0.9 K/UL (ref 0–1.3)
MONOCYTES RELATIVE PERCENT: 13.8 %
NEUTROPHILS ABSOLUTE: 3.6 K/UL (ref 1.7–7.7)
NEUTROPHILS RELATIVE PERCENT: 56.1 %
PDW BLD-RTO: 14.6 % (ref 12.4–15.4)
PERFORMED ON: ABNORMAL
PLATELET # BLD: 248 K/UL (ref 135–450)
PMV BLD AUTO: 7.2 FL (ref 5–10.5)
RBC # BLD: 4.26 M/UL (ref 4–5.2)
WBC # BLD: 6.3 K/UL (ref 4–11)

## 2021-06-24 PROCEDURE — 85025 COMPLETE CBC W/AUTO DIFF WBC: CPT

## 2021-06-24 PROCEDURE — 6360000002 HC RX W HCPCS: Performed by: INTERNAL MEDICINE

## 2021-06-24 PROCEDURE — 36415 COLL VENOUS BLD VENIPUNCTURE: CPT

## 2021-06-24 PROCEDURE — 6360000004 HC RX CONTRAST MEDICATION: Performed by: INTERNAL MEDICINE

## 2021-06-24 PROCEDURE — 99024 POSTOP FOLLOW-UP VISIT: CPT | Performed by: PHYSICIAN ASSISTANT

## 2021-06-24 PROCEDURE — 97116 GAIT TRAINING THERAPY: CPT

## 2021-06-24 PROCEDURE — 97110 THERAPEUTIC EXERCISES: CPT

## 2021-06-24 PROCEDURE — 2700000000 HC OXYGEN THERAPY PER DAY

## 2021-06-24 PROCEDURE — 6370000000 HC RX 637 (ALT 250 FOR IP): Performed by: ANESTHESIOLOGY

## 2021-06-24 PROCEDURE — 97535 SELF CARE MNGMENT TRAINING: CPT

## 2021-06-24 PROCEDURE — 99232 SBSQ HOSP IP/OBS MODERATE 35: CPT | Performed by: PHYSICIAN ASSISTANT

## 2021-06-24 PROCEDURE — 6370000000 HC RX 637 (ALT 250 FOR IP): Performed by: ORTHOPAEDIC SURGERY

## 2021-06-24 PROCEDURE — 1200000000 HC SEMI PRIVATE

## 2021-06-24 PROCEDURE — 99223 1ST HOSP IP/OBS HIGH 75: CPT | Performed by: INTERNAL MEDICINE

## 2021-06-24 PROCEDURE — 6370000000 HC RX 637 (ALT 250 FOR IP): Performed by: PHYSICIAN ASSISTANT

## 2021-06-24 PROCEDURE — 94761 N-INVAS EAR/PLS OXIMETRY MLT: CPT

## 2021-06-24 PROCEDURE — 2580000003 HC RX 258: Performed by: ORTHOPAEDIC SURGERY

## 2021-06-24 PROCEDURE — 71260 CT THORAX DX C+: CPT

## 2021-06-24 PROCEDURE — 97530 THERAPEUTIC ACTIVITIES: CPT

## 2021-06-24 RX ORDER — FUROSEMIDE 10 MG/ML
40 INJECTION INTRAMUSCULAR; INTRAVENOUS ONCE
Status: COMPLETED | OUTPATIENT
Start: 2021-06-24 | End: 2021-06-24

## 2021-06-24 RX ORDER — IPRATROPIUM BROMIDE AND ALBUTEROL SULFATE 2.5; .5 MG/3ML; MG/3ML
1 SOLUTION RESPIRATORY (INHALATION) EVERY 4 HOURS PRN
Status: DISCONTINUED | OUTPATIENT
Start: 2021-06-24 | End: 2021-06-26 | Stop reason: HOSPADM

## 2021-06-24 RX ORDER — IPRATROPIUM BROMIDE AND ALBUTEROL SULFATE 2.5; .5 MG/3ML; MG/3ML
1 SOLUTION RESPIRATORY (INHALATION)
Status: DISCONTINUED | OUTPATIENT
Start: 2021-06-24 | End: 2021-06-24

## 2021-06-24 RX ADMIN — IOPAMIDOL 100 ML: 755 INJECTION, SOLUTION INTRAVENOUS at 09:13

## 2021-06-24 RX ADMIN — CLONAZEPAM 2 MG: 1 TABLET ORAL at 20:20

## 2021-06-24 RX ADMIN — TRAZODONE HYDROCHLORIDE 100 MG: 100 TABLET ORAL at 20:20

## 2021-06-24 RX ADMIN — ACETAMINOPHEN 650 MG: 325 TABLET ORAL at 16:13

## 2021-06-24 RX ADMIN — ACETAMINOPHEN 650 MG: 325 TABLET ORAL at 04:49

## 2021-06-24 RX ADMIN — ACETAMINOPHEN 650 MG: 325 TABLET ORAL at 20:20

## 2021-06-24 RX ADMIN — GABAPENTIN 300 MG: 300 CAPSULE ORAL at 08:00

## 2021-06-24 RX ADMIN — ATORVASTATIN CALCIUM 20 MG: 10 TABLET, FILM COATED ORAL at 20:20

## 2021-06-24 RX ADMIN — NIFEDIPINE 60 MG: 30 TABLET, FILM COATED, EXTENDED RELEASE ORAL at 20:21

## 2021-06-24 RX ADMIN — CITALOPRAM HYDROBROMIDE 40 MG: 20 TABLET ORAL at 20:20

## 2021-06-24 RX ADMIN — LOSARTAN POTASSIUM 50 MG: 25 TABLET, FILM COATED ORAL at 20:19

## 2021-06-24 RX ADMIN — OXYCODONE HYDROCHLORIDE 5 MG: 5 TABLET ORAL at 20:20

## 2021-06-24 RX ADMIN — ASPIRIN 325 MG: 325 TABLET, COATED ORAL at 08:01

## 2021-06-24 RX ADMIN — DOCUSATE SODIUM 50MG AND SENNOSIDES 8.6MG 1 TABLET: 8.6; 5 TABLET, FILM COATED ORAL at 20:21

## 2021-06-24 RX ADMIN — GABAPENTIN 300 MG: 300 CAPSULE ORAL at 16:13

## 2021-06-24 RX ADMIN — GABAPENTIN 300 MG: 300 CAPSULE ORAL at 20:20

## 2021-06-24 RX ADMIN — ASPIRIN 325 MG: 325 TABLET, COATED ORAL at 20:21

## 2021-06-24 RX ADMIN — FUROSEMIDE 40 MG: 10 INJECTION, SOLUTION INTRAVENOUS at 18:45

## 2021-06-24 RX ADMIN — OMEPRAZOLE 20 MG: 20 CAPSULE, DELAYED RELEASE ORAL at 20:20

## 2021-06-24 RX ADMIN — SODIUM CHLORIDE, PRESERVATIVE FREE 10 ML: 5 INJECTION INTRAVENOUS at 08:01

## 2021-06-24 RX ADMIN — DOCUSATE SODIUM 50MG AND SENNOSIDES 8.6MG 1 TABLET: 8.6; 5 TABLET, FILM COATED ORAL at 08:01

## 2021-06-24 ASSESSMENT — PAIN SCALES - GENERAL
PAINLEVEL_OUTOF10: 6
PAINLEVEL_OUTOF10: 7
PAINLEVEL_OUTOF10: 0

## 2021-06-24 NOTE — PROGRESS NOTES
ABG drawn from right radial. One attempt. Sight prepped per policy and procedure. Modified Alexandro's test positive. Pressure held to sight after procedure for five minutes. No hematoma present. Pulse present after procedure. Pt tolerated procedure well. Specimen sent to lab.  6 lpm NC    Extension tubing x2 removed from NC pt remains drowsy, but awakens easily, alert and oriented. HS meds at 2017 - see MAR. Pt does not wear O2 at home and has required O@ continuously since 0015 6/23. Sats now 89% on 5 lpm - will titrate up. RT at bedside and noted \"tree\" not securely attached to flowmeter.  Secured and pt sats 92% on 5 lpm    [address]

## 2021-06-24 NOTE — PROGRESS NOTES
Call made to Patient's RN concerning lift sheet placement under pt before CT scan as well as additional iv placement in ac per CT exam protocol. RN to call CT department when ready.

## 2021-06-24 NOTE — PROGRESS NOTES
Hospitalist Progress Note      PCP: Tyrel Pike MD    Date of Admission: 6/21/2021    Subjective: denies any chest pain or SOB, requiring 5L NC, no home O2 at baseline. Afebrile, CXR negative    Medications:  Reviewed    Infusion Medications    sodium chloride 25 mL (06/21/21 1720)     Scheduled Medications    gabapentin  300 mg Oral TID    atorvastatin  20 mg Oral Daily    citalopram  40 mg Oral Nightly    clonazePAM  2 mg Oral Nightly    losartan  50 mg Oral Daily    NIFEdipine  60 mg Oral Daily    omeprazole  20 mg Oral Nightly    traZODone  100 mg Oral Nightly    sodium chloride flush  5-40 mL Intravenous 2 times per day    acetaminophen  650 mg Oral Q6H    sennosides-docusate sodium  1 tablet Oral BID    aspirin  325 mg Oral BID     PRN Meds: ketorolac, sodium chloride flush, sodium chloride, oxyCODONE **OR** oxyCODONE, bisacodyl, ondansetron **OR** ondansetron      Intake/Output Summary (Last 24 hours) at 6/24/2021 0925  Last data filed at 6/23/2021 1845  Gross per 24 hour   Intake 600 ml   Output --   Net 600 ml       Physical Exam Performed:    /69   Pulse 83   Temp 98.3 °F (36.8 °C) (Oral)   Resp 18   Ht 5' 8\" (1.727 m)   Wt (!) 365 lb 6.4 oz (165.7 kg)   SpO2 93%   BMI 55.56 kg/m²   General:  Awake, alert and oriented. Appears to not be in any distress, morbidly obese, resting in bed  Mucous Membranes:  Pink , anicteric  Chest:  Clear to auscultation bilaterally, no added sounds, on 5L NC  Cardiovascular:  RRR S1S2 heard, no murmurs or gallops  Abdomen:  Soft, undistended, non tender, no organomegaly, BS present  Extremities: s/p left total knee arthroplasty, no cyanosis.  Distal pulses well felt  Neurological : no focal deficits, moves all extremities, follows commands, no dysarthria       Labs:   Recent Labs     06/22/21  0625 06/23/21  0552 06/24/21  0603   WBC 8.6 6.6 6.3   HGB 12.0 11.0* 11.6*   HCT 36.3 32.9* 35.5*    234 248     Recent Labs     06/23/21  5244    K 4.0      CO2 23   BUN 11   CREATININE 0.7   CALCIUM 8.7     Urinalysis:      Lab Results   Component Value Date    NITRU Negative 05/25/2021    WBCUA 23 05/25/2021    BACTERIA 4+ 05/25/2021    RBCUA 0-2 05/25/2021    BLOODU Negative 05/25/2021    SPECGRAV 1.028 05/25/2021    GLUCOSEU Negative 05/25/2021     Radiology:    XR CHEST (2 VW)   Final Result   Low lung volumes. No acute abnormality. XR KNEE LEFT (1-2 VIEWS)   Final Result   Total knee arthropasty without acute hardware complication. CT CHEST PULMONARY EMBOLISM W CONTRAST    (Results Pending)           Assessment/Plan:    Active Hospital Problems    Diagnosis     Hyperlipidemia [E78.5]     Postoperative hypoxia [R09.02, Z98.890]     Primary osteoarthritis of left knee [M17.12]     ESTHER (obstructive sleep apnea) [G47.33]      Left Knee Osteoarthritis s/p left total knee replacement  - POD#3  - Continue post op management, pain medication to prevent drowsiness. - continue BP meds. Stopped IVF - tolerating diet  - henriquez removed, urinating well  - PT/OT, IS to prevent atelectasis   - CBC daily  - ASA for DVT prophylaxis  - on [facility] Senokot   - plan for SNF    Acute hypoxic resp failure   - O2 sats dropped to 61% on RA, placed on O2, associated with hypotension  - check CXR: low lung volumes, no acute abn  - d-dimer: 1218  - check stat CT chest, change to full dose lovenox while awaiting results  - concern for worsening hypoxia at night, likely 2/2 ESTHER  - wean O2 as able     HTN  - well controlled  - cont Losartan, Nifedipine     HLD  - cont Lipitor     Anxiety  Depression  - cont Celexa, [facility] Klonopin and Trazodone     ESTHER  - non-compliant with CPAP     S/p Laparoscopic Sleeve Gastrectomy     GERD  - cont Prilosec     Morbid Obesity  - Body mass index is 55.56 kg/m². - Complicating assessment and treatment.  Placing patient at risk for multiple co-morbidities as well as early death and contributing to the patient's presentation.   - Counseled on weight loss.      DVT Prophylaxis: Full dose Lovenox until CT results  Diet: ADULT DIET;  Regular  Code Status: Full Code    PT/OT Eval Status: ordered    Dispo - cont care, await CTPA, full dose lovenox    MATILDE Jacobs PA-C 9:37 AM 6/24/2021     Addendum: CTPA was negative, stopped Lovenox, switched back to ASA BID, pulmonology consult placed    Johnie Jacobs PA-C 10:18 AM 6/24/2021

## 2021-06-24 NOTE — PROGRESS NOTES
Shift assessment complete. No drainage on dressing placed yesterday. Patient denies any needs at this time. Will continue to monitor.

## 2021-06-24 NOTE — PLAN OF CARE
Problem: Pain:  Goal: Pain level will decrease  Description: Pain level will decrease  Outcome: Ongoing  Goal: Control of acute pain  Description: Control of acute pain  Outcome: Ongoing  Goal: Control of chronic pain  Description: Control of chronic pain  Outcome: Ongoing     Problem: Discharge Planning:  Goal: Discharged to appropriate level of care  Description: Discharged to appropriate level of care  Outcome: Ongoing     Problem: Mobility - Impaired:  Goal: Mobility will improve  Description: Mobility will improve  Outcome: Ongoing     Problem: Infection - Surgical Site:  Goal: Will show no infection signs and symptoms  Description: Will show no infection signs and symptoms  Outcome: Ongoing     Problem: Pain - Acute:  Goal: Pain level will decrease  Description: Pain level will decrease  Outcome: Ongoing

## 2021-06-24 NOTE — PROGRESS NOTES
Physician Progress Note      Edgardo Martinez  CSN #:                  [id number]  :                       1960  ADMIT DATE:       2021 7:23 AM  DISCH DATE:  RESPONDING  PROVIDER #:        Joie CLAYTON          QUERY TEXT:    Pt admitted for total knee replacement. Pt noted to have continue need for   supplemental oxygen. If possible, please document in the progress notes and   discharge summary if you are evaluating and/or treating any of the following: The medical record reflects the following:  Risk Factors: s/p total knee replacement, obstructive sleep apnea and   non-compliant with CPAP  Clinical Indicators: RR 13-20, SPO2 %, 2-10 L NC/simple mask  Treatment: supplemental oxygen, monitor O2 sats, supportive care    Thank you,  Maria Luz Awad RN, CDS  [phone]  Options provided:  -- Acute respiratory failure with hypoxia  -- Patient has hypoxia  -- Other - I will add my own diagnosis  -- Disagree - Not applicable / Not valid  -- Disagree - Clinically unable to determine / Unknown  -- Refer to Clinical Documentation Reviewer    PROVIDER RESPONSE TEXT:    This patient has hypoxia.     Query created by: Celsa Atkinson on 2021 3:04 PM      Electronically signed by:  Joie CLAYTON 2021 5:08 PM

## 2021-06-24 NOTE — PROGRESS NOTES
Hospitalist Progress Note      PCP: Candance Fore, MD    Date of Admission: 6/21/2021    Subjective: pt became hypoxic/hypotensive this am, family concerned about PE    Medications:  Reviewed    Infusion Medications    sodium chloride 25 mL (06/21/21 1720)     Scheduled Medications    gabapentin  300 mg Oral TID    atorvastatin  20 mg Oral Daily    citalopram  40 mg Oral Nightly    clonazePAM  2 mg Oral Nightly    losartan  50 mg Oral Daily    NIFEdipine  60 mg Oral Daily    omeprazole  20 mg Oral Nightly    traZODone  100 mg Oral Nightly    sodium chloride flush  5-40 mL Intravenous 2 times per day    acetaminophen  650 mg Oral Q6H    sennosides-docusate sodium  1 tablet Oral BID    aspirin  325 mg Oral BID     PRN Meds: ketorolac, sodium chloride flush, sodium chloride, oxyCODONE **OR** oxyCODONE, bisacodyl, ondansetron **OR** ondansetron      Intake/Output Summary (Last 24 hours) at 6/23/2021 3079  Last data filed at 6/23/2021 1845  Gross per 24 hour   Intake 960 ml   Output --   Net 960 ml       Physical Exam Performed:    /67   Pulse 79   Temp 98.2 °F (36.8 °C) (Oral)   Resp 18   Ht 5' 8\" (1.727 m)   Wt (!) 365 lb 6.4 oz (165.7 kg)   SpO2 92%   BMI 55.56 kg/m²     General:  Awake, alert and oriented. Appears to not be in any distress, morbidly obese, resting in bed  Mucous Membranes:  Pink , anicteric  Chest:  Clear to auscultation bilaterally, no added sounds  Cardiovascular:  RRR S1S2 heard, no murmurs or gallops  Abdomen:  Soft, undistended, non tender, no organomegaly, BS present  Extremities: s/p left total knee arthroplasty, knee brace in place,  no cyanosis.  Distal pulses well felt  Neurological : no focal deficits, moves all extremities, follows commands, no dysarthria       Labs:   Recent Labs     06/22/21  0625 06/23/21  0552   WBC 8.6 6.6   HGB 12.0 11.0*   HCT 36.3 32.9*    234     Recent Labs     06/23/21  1101      K 4.0      CO2 23   BUN 11 the patient's presentation.   - Counseled on weight loss.        DVT Prophylaxis: ASA  Diet: ADULT DIET;  Regular  Code Status: Full Code    PT/OT Eval Status: ordered    Maral Valdes MD

## 2021-06-24 NOTE — PROGRESS NOTES
Occupational Therapy Daily Treatment Note    Unit: [facility]  Date:  6/24/2021  Patient Name:    Ana Alberts  Admitting diagnosis:  Primary osteoarthritis of left knee [M17.12]  Admit Date:  6/21/2021  Precautions/Restrictions:  fall risk, IV and WB restrictions (L LE WBAT)   KI donned when standing      Discharge Recommendations: SNF  DME needs for discharge: defer to facility       Therapy recommendations for staff:   Assist of 1 (minimal assist) with use of rolling walker (RW) for all ambulation to/from bathroom    AM-PAC Score: AM-PAC Inpatient Daily Activity Raw Score: 15  Home Health S4 Level: NA       Treatment Time:  15:00-15:40  Treatment number:  2    Total Treatment Time:   40 minutes    History of Present Illness: 56 y. o. female with a PMH of HTN, Anxiety, Depression, ESTHER, Morbid Obesity, s/p laparoscopic sleeve gastrectomy who presents for elective left total knee arthoplasty by Dr. Delmy Gerard 6/21/2021. Underwent surgery uneventfully and was transferred to floor for further post operative management. Pt mentions to me that pain is controlled well  and worsens with movement. Subjective: Patient supine in bed and agreeable to treatment.     Pain   Yes  Rating: moderate  Location: L knee  Pain Medicine Status: Denies need      Bed Mobility:   Supine to Sit:  SBA  Sit to Supine:  Not Tested  Rolling:           Not Tested  Scooting:        SBA    Transfer Training:   Sit to stand:   CGA  Stand to sit:  CGA  Bed to Chair:  CGA and with use of RW  Bed to [facility]:   Not Tested  Standard toilet:   CGA and with use of RW    Activity Tolerance   Pt completed therapy session with SOB noted with activity  SpO2: >90% on 3L of O2  HR:   BP:     ADL Training:   Upper body dressing:  Not Tested  Upper body bathing:  Not Tested  Lower body dressing:  Not Tested  Lower body bathing:  Not Tested  Toileting:   SBA  Grooming/Hygiene:  Not Tested    Therapeutic Exercise:   N/A    Patient Education:   Role of OT  Recommendations for DC  Safe RW use/hand placement    Positioning Needs:   Up in chair, LE elevated to promote extension, Cold pack applied. Alarm Set    Family Present:  No    Assessment: Patient is demonstrating improved functional mobility and participation in ADLs. Will benefit from SNF at DC to improve independence and activity tolerance. GOALS  To be met in 3 Visits:  1). Bed to toilet/BSC: CGA     To be met in 5 Visits:  1). Supine to/from Sit:             Min A  2). Upper Body Bathing:         SBA  3). Lower Body Bathing: Mod A  4). Upper Body Dressing:       SBA  5). Lower Body Dressing: Mod A  6).  Pt to demonstrate UE exs x 15 reps with minimal cues      Plan: cont with [facility], OTR/L #[id number]      If patient discharges from this facility prior to next visit, this note will serve as the Discharge Summary

## 2021-06-24 NOTE — PROGRESS NOTES
Physical Therapy  Inpatient Physical Therapy Daily Treatment Note    Unit: [address]  Date:  2021  Patient Name:    Elda Sibley  Admitting diagnosis:  Primary osteoarthritis of left knee [M17.12]  Admit Date:  2021  Precautions/Restrictions:  Fall risk, Bed/chair alarm, Lines -IV, Telemetry, Continuous pulse oximetry, WB Restrictions (FWB LLE) and Knee immobilizer      Discharge Recommendations: SNF  DME needs for discharge: defer to facility       Therapy recommendation for EMS Transport: can transport by wheelchair    Therapy recommendations for staff:   Assist of 1 with use of rolling walker (RW), knee immobilizer and gait belt for all transfers and ambulation within room    History of Present Illness: 56 y. o. female with a PMH of HTN, Anxiety, Depression, ESTHER, Morbid Obesity, s/p laparoscopic sleeve gastrectomy who presents for elective left total knee arthoplasty by Dr. Maricel Parisi 2021. Underwent surgery uneventfully and was transferred to floor for further post operative management. Pt mentions to me that pain is controlled well  and worsens with movement. Chest CT performed 2021: no evidence of PE or acute pulmonary abnormality    Home Health S4 Level Recommendation: NA  AM-PAC Mobility Score   AM-PAC Inpatient Mobility Raw Score : 16       Treatment Time:  10:22-11:10  Treatment number: 4  Timed Code Treatment Minutes: 48 minutes  Total Treatment Minutes:  48  minutes    Cognition    A&O x4   Able to follow 2 step commands    Subjective  Patient sitting up in chair with family at bedside   Pt agreeable to this PT tx.      Pain   Yes  Location: L knee  Ratin/10  Pain Medicine Status: Pain med requested and RN notified   2/10 at rest, up to 8/10 with heel slide    Bed Mobility   Supine to Sit:    Not Tested  Sit to Supine:   Not Tested  Rolling:   Not Tested  Scooting:   Not Tested    Transfer Training     Sit to stand:   CGA and Min A  from recliner  Stand to sit: CGA  Bed to Chair:   Not Tested with use of N/A    Gait Training gait completed as indicated below  Distance:      20 ft  Deviations (firm surface/linoleum):  decreased amada, increased CHINO, step to pattern and antalgic pattern  Assistive Device Used:    gait belt, rolling walker (RW) and knee immobilizer on left  Level of Assist:    SBA  Comment: HR 97 bpm, Spo2 96% on 5 L following ambulation    Stair Training stairs completed as indicated below  # of Steps:   1x4  Level of Assist:  CGA  UE Support:  NA  Assistive Device:  RW and KI on LLE  Pattern:   non-reciprocal pattern  Comments: verbal cues for technique. SpO2 95% on 5 L,  bpm following stair training    Therapeutic Exercise all completed bilaterally unless indicated  Ankle Pumps x 10 reps  Quad sets x 10 reps  Heel slides x 10 reps  SLR x 10 reps    Balance  Sitting:  Good - ; SBA    Standing: Fair +; SBA  Comments: with RW, standing x5 minutes while assessing BP. Mild dizziness reported    Patient Education      Role of PT, POC, Discharge recommendations, safety awareness, transfer techniques, energy conservation, pacing activity, HEP and calling for assist with mobility. Positioning Needs       Pt reclined in chair, no alarm needed, positioned in proper neutral alignment and pressure relief provided. Call light provided and all needs within reach  Ice provided and instructed in proper off/on schedule    Activity Tolerance   Pt completed therapy session with No adverse symptoms noted w/activity. At Rest: /74, HR 82 bpm, SpO2 96% on 5L    Assessment :  Patient continues to progress well with therapy. Pt able to progress ambulation about 20 ft this date and performing 6\" step this date x4 reps with CGA. Pt demonstrates good understanding of technique. Spo2 mid 90s% on 5L throughout session.   Recommending SNF upon discharge as patient functioning well below baseline, demonstrates good rehab potential and unable to return home due to inability to negotiate stairs to enter home/bedroom/bathroom, burden of care beyond caregiver ability, home environment not conducive to patient recovery and limited safety awareness. Goals (all goals ongoing unless otherwise indicated)  To be met in 3 visits:  1). Independent with LE Ex x 10 reps     To be met in 6 visits:  1).  Supine to/from sit: Mod A   2).  Sit to/from stand: Min A  from standard sitting surface. 3).  Bed to chair: Min A   4).  Gait: Ambulate 50 ft.  with  SBA and use of LRAD (least restrictive assistive device)  5).  Tolerate B LE exercises 3 sets of 10-15 reps  6).  Ascend/descend 2 steps with Mod A  with use of hand rail unilateral and LRAD (least restrictive assistive device)    Plan   Continue with plan of care. Signature: Conor Farmer, PT, DPT      If patient discharges from this facility prior to next visit, this note will serve as the Discharge Summary.

## 2021-06-24 NOTE — CONSULTS
Patient is being seen at the request of MATILDE Neff for a consultation for hypoxia, worse at night    HISTORY OF PRESENT ILLNESS:   56years old with history of hypertension, ESTHER, morbid obesity admitted for elective left total knee arthroplasty 6/21/2021. Postoperatively admitted for observation. Hypoxemic since admission, at times required up to 10 L O2. Worse with exertion and at night. Better with resting. Some shortness of breath on exertion. Patient currently on 5 L O2. Underwent CTPA today and showed no PE. No home O2. Quit smoking 20 years ago, smoked 1-2 ppd for 20-28 years. No IBD at home. History of ESTHER could not tolerate and sent CPAP back 2 year ago. Denies any cough or sputum. No hemoptysis. Positive LE edema. No history of DVT or PE. PAST MEDICAL HISTORY:  Past Medical History:   Diagnosis Date    Anxiety     with panic attacks    Arthritis     knees    Chronic back pain     Dental crowns present     upper left dental implant    Depression     Hyperlipidemia     borderline    Hypertension     Hypotension     Her Father after anesthesia    IBS (irritable bowel syndrome)     with rectal bleeding    Knee osteoarthritis 1/25/2013    Morbid obesity ([address]     Positive H. pylori test 12/10/12    Prolonged emergence from general anesthesia      PAST SURGICAL HISTORY:  Past Surgical History:   Procedure Laterality Date    CHOLECYSTECTOMY  2008    COLONOSCOPY      due age 46    COLONOSCOPY  12/10/12    FOOT FRACTURE SURGERY      FOOT SURGERY  11/2011    Right peroneal tendon Yaneth Harper); ACP peroneal tendons with US guided injection    JOINT REPLACEMENT Right 2013    knee    KNEE ARTHROSCOPY Left 10/13/2017    Left knee DVA with partial medial menisectomy and loose body removal; internal fixation of insuff.  Fx's of the MFT/MTP with bone substitute    SLEEVE GASTRECTOMY  12/08/2017    LAPAROSCOPIC SLEEVE GASTRECTOMY WITH HIATAL HERNIA REPAIR    TONSILLECTOMY  1963  TOTAL KNEE ARTHROPLASTY Left 06/21/2021    TOTAL KNEE ARTHROPLASTY Left 6/21/2021    LEFT TOTAL KNEE REPLACEMENT performed by Renee Mendez MD at [place] [address]  12/10/2012    gastritis       FAMILY HISTORY:  family history includes Arthritis in her maternal grandmother, mother, and sister; Cancer in her mother; Diabetes in her father; High Blood Pressure in her father and paternal grandmother; High Cholesterol in her mother; Obesity in her father; Other in her father; Substance Abuse in her maternal grandfather. SOCIAL HISTORY:   reports that she quit smoking about 5 years ago. She has a 31.50 pack-year smoking history. She has never used smokeless tobacco.    Scheduled Meds:   aspirin  325 mg Oral BID    gabapentin  300 mg Oral TID    atorvastatin  20 mg Oral Daily    citalopram  40 mg Oral Nightly    clonazePAM  2 mg Oral Nightly    losartan  50 mg Oral Daily    NIFEdipine  60 mg Oral Daily    omeprazole  20 mg Oral Nightly    traZODone  100 mg Oral Nightly    sodium chloride flush  5-40 mL Intravenous 2 times per day    acetaminophen  650 mg Oral Q6H    sennosides-docusate sodium  1 tablet Oral BID     Continuous Infusions:   sodium chloride 25 mL (06/21/21 1720)     PRN Meds:  ketorolac, sodium chloride flush, sodium chloride, oxyCODONE **OR** oxyCODONE, bisacodyl, ondansetron **OR** ondansetron    ALLERGIES:  Patient has No Known Allergies.     REVIEW OF SYSTEMS:  Constitutional: Negative for fever  HENT: Negative for sore throat  Eyes: Negative for redness   Respiratory: + Dyspnea  Cardiovascular: Negative for chest pain  Gastrointestinal: Negative for vomiting, diarrhea   Genitourinary: Negative for hematuria   Musculoskeletal: Negative for arthralgias   Skin: Negative for rash  Neurological: Negative for syncope  Hematological: Negative for adenopathy  Psychiatric/Behavorial: Negative for anxiety    PHYSICAL EXAM:  Blood pressure 113/69, pulse 83, temperature 98.3 °F (36.8 °C), temperature source Oral, resp. rate 18, height 5' 8\" (1.727 m), weight (!) 365 lb 6.4 oz (165.7 kg), SpO2 93 %, not currently breastfeeding.' on 5 L  Gen: No distress. Morbidly obese  Eyes: PERRL. No sclera icterus. No conjunctival injection. ENT: No discharge. Pharynx clear. Neck: Trachea midline. No obvious mass. Resp: No accessory muscle use. Few crackles. No wheezes. No rhonchi. No dullness on percussion. CV: Regular rate. Regular rhythm. No murmur or rub.  1-2+ LE edema. GI: Non-tender. Non-distended. No hernia. Skin: Warm and dry. No nodule on exposed extremities. Lymph: No cervical LAD. No supraclavicular LAD. M/S: No cyanosis. No joint deformity. No clubbing. Neuro: Awake. Alert. Moves all four extremities. Psych: Oriented x 3. No anxiety. LABS:  CBC:   Recent Labs     06/22/21  0625 06/23/21  0552 06/24/21  0603   WBC 8.6 6.6 6.3   HGB 12.0 11.0* 11.6*   HCT 36.3 32.9* 35.5*   MCV 81.9 82.2 83.2    234 248     BMP:   Recent Labs     06/23/21  1101      K 4.0      CO2 23   BUN 11   CREATININE 0.7     LIVER PROFILE: No results for input(s): AST, ALT, LIPASE, BILIDIR, BILITOT, ALKPHOS in the last 72 hours. Invalid input(s): AMYLASE,  ALB  PT/INR: No results for input(s): PROTIME, INR in the last 72 hours. APTT: No results for input(s): APTT in the last 72 hours. UA:No results for input(s): NITRITE, COLORU, PHUR, LABCAST, WBCUA, RBCUA, MUCUS, TRICHOMONAS, YEAST, BACTERIA, CLARITYU, SPECGRAV, LEUKOCYTESUR, UROBILINOGEN, BILIRUBINUR, BLOODU, GLUCOSEU, AMORPHOUS in the last 72 hours.     Invalid input(s): Hao Negus  Recent Labs     06/23/21 2036   PHART 7.352   SST3NKJ 53.0*   PO2ART 53.0*       CT chest 6/24 imaging was reviewed by me and showed   No evidence of PE or acute pulmonary abnormalities    HST 12/15/19 AHI 5.8 desaturation to 76% and spent 223 min < 89%     ASSESSMENT:  · Acute hypoxemic respiratory failure  · Obesity

## 2021-06-24 NOTE — PROGRESS NOTES
Consult has been called to Dr. Robbie Wilson on 6/24/21. Spoke with jaclyn.  10:21 AM    Tonja Rojas  6/24/2021

## 2021-06-24 NOTE — PROGRESS NOTES
PM assessment completed, see flowsheet. No complaints or needs at this time. Call light within reach, bed in lowest position.

## 2021-06-24 NOTE — PROGRESS NOTES
Upon patients q4 vital checks, there was a bloody mucous laying on her bed sheets. Patient noted she had coughed this up \"she thinks\". Patients oxygen saturation is now >97% on 5L. Will titrate accordingly.

## 2021-06-25 LAB
ANION GAP SERPL CALCULATED.3IONS-SCNC: 9 MMOL/L (ref 3–16)
BUN BLDV-MCNC: 11 MG/DL (ref 7–20)
CALCIUM SERPL-MCNC: 8.7 MG/DL (ref 8.3–10.6)
CHLORIDE BLD-SCNC: 102 MMOL/L (ref 99–110)
CO2: 27 MMOL/L (ref 21–32)
CREAT SERPL-MCNC: <0.5 MG/DL (ref 0.6–1.2)
GFR AFRICAN AMERICAN: >60
GFR NON-AFRICAN AMERICAN: >60
GLUCOSE BLD-MCNC: 92 MG/DL (ref 70–99)
POTASSIUM SERPL-SCNC: 3.5 MMOL/L (ref 3.5–5.1)
SODIUM BLD-SCNC: 138 MMOL/L (ref 136–145)

## 2021-06-25 PROCEDURE — 97110 THERAPEUTIC EXERCISES: CPT

## 2021-06-25 PROCEDURE — 97530 THERAPEUTIC ACTIVITIES: CPT

## 2021-06-25 PROCEDURE — 6370000000 HC RX 637 (ALT 250 FOR IP): Performed by: PHYSICIAN ASSISTANT

## 2021-06-25 PROCEDURE — 1200000000 HC SEMI PRIVATE

## 2021-06-25 PROCEDURE — 97116 GAIT TRAINING THERAPY: CPT

## 2021-06-25 PROCEDURE — 6360000002 HC RX W HCPCS: Performed by: INTERNAL MEDICINE

## 2021-06-25 PROCEDURE — 2700000000 HC OXYGEN THERAPY PER DAY

## 2021-06-25 PROCEDURE — 99232 SBSQ HOSP IP/OBS MODERATE 35: CPT | Performed by: INTERNAL MEDICINE

## 2021-06-25 PROCEDURE — 2580000003 HC RX 258: Performed by: ORTHOPAEDIC SURGERY

## 2021-06-25 PROCEDURE — 80048 BASIC METABOLIC PNL TOTAL CA: CPT

## 2021-06-25 PROCEDURE — 94660 CPAP INITIATION&MGMT: CPT

## 2021-06-25 PROCEDURE — 6370000000 HC RX 637 (ALT 250 FOR IP): Performed by: ORTHOPAEDIC SURGERY

## 2021-06-25 PROCEDURE — 94761 N-INVAS EAR/PLS OXIMETRY MLT: CPT

## 2021-06-25 PROCEDURE — 99233 SBSQ HOSP IP/OBS HIGH 50: CPT | Performed by: INTERNAL MEDICINE

## 2021-06-25 PROCEDURE — 6370000000 HC RX 637 (ALT 250 FOR IP): Performed by: ANESTHESIOLOGY

## 2021-06-25 PROCEDURE — 36415 COLL VENOUS BLD VENIPUNCTURE: CPT

## 2021-06-25 RX ORDER — FUROSEMIDE 10 MG/ML
40 INJECTION INTRAMUSCULAR; INTRAVENOUS ONCE
Status: COMPLETED | OUTPATIENT
Start: 2021-06-25 | End: 2021-06-25

## 2021-06-25 RX ADMIN — ASPIRIN 325 MG: 325 TABLET, COATED ORAL at 08:04

## 2021-06-25 RX ADMIN — ACETAMINOPHEN 650 MG: 325 TABLET ORAL at 21:05

## 2021-06-25 RX ADMIN — SODIUM CHLORIDE, PRESERVATIVE FREE 10 ML: 5 INJECTION INTRAVENOUS at 08:06

## 2021-06-25 RX ADMIN — ACETAMINOPHEN 650 MG: 325 TABLET ORAL at 08:04

## 2021-06-25 RX ADMIN — ACETAMINOPHEN 650 MG: 325 TABLET ORAL at 18:34

## 2021-06-25 RX ADMIN — FUROSEMIDE 40 MG: 10 INJECTION, SOLUTION INTRAVENOUS at 08:05

## 2021-06-25 RX ADMIN — OMEPRAZOLE 20 MG: 20 CAPSULE, DELAYED RELEASE ORAL at 21:05

## 2021-06-25 RX ADMIN — OXYCODONE HYDROCHLORIDE 5 MG: 5 TABLET ORAL at 18:35

## 2021-06-25 RX ADMIN — GABAPENTIN 300 MG: 300 CAPSULE ORAL at 21:05

## 2021-06-25 RX ADMIN — OXYCODONE HYDROCHLORIDE 5 MG: 5 TABLET ORAL at 13:53

## 2021-06-25 RX ADMIN — SODIUM CHLORIDE, PRESERVATIVE FREE 10 ML: 5 INJECTION INTRAVENOUS at 21:06

## 2021-06-25 RX ADMIN — DOCUSATE SODIUM 50MG AND SENNOSIDES 8.6MG 1 TABLET: 8.6; 5 TABLET, FILM COATED ORAL at 08:04

## 2021-06-25 RX ADMIN — CITALOPRAM HYDROBROMIDE 40 MG: 20 TABLET ORAL at 21:06

## 2021-06-25 RX ADMIN — NIFEDIPINE 60 MG: 30 TABLET, FILM COATED, EXTENDED RELEASE ORAL at 21:06

## 2021-06-25 RX ADMIN — DOCUSATE SODIUM 50MG AND SENNOSIDES 8.6MG 1 TABLET: 8.6; 5 TABLET, FILM COATED ORAL at 21:05

## 2021-06-25 RX ADMIN — ATORVASTATIN CALCIUM 20 MG: 10 TABLET, FILM COATED ORAL at 21:04

## 2021-06-25 RX ADMIN — GABAPENTIN 300 MG: 300 CAPSULE ORAL at 08:05

## 2021-06-25 RX ADMIN — TRAZODONE HYDROCHLORIDE 100 MG: 100 TABLET ORAL at 21:06

## 2021-06-25 RX ADMIN — CLONAZEPAM 2 MG: 1 TABLET ORAL at 21:05

## 2021-06-25 RX ADMIN — LOSARTAN POTASSIUM 50 MG: 25 TABLET, FILM COATED ORAL at 21:05

## 2021-06-25 RX ADMIN — GABAPENTIN 300 MG: 300 CAPSULE ORAL at 13:53

## 2021-06-25 RX ADMIN — ACETAMINOPHEN 650 MG: 325 TABLET ORAL at 03:19

## 2021-06-25 RX ADMIN — ASPIRIN 325 MG: 325 TABLET, COATED ORAL at 21:06

## 2021-06-25 ASSESSMENT — PAIN SCALES - GENERAL
PAINLEVEL_OUTOF10: 7
PAINLEVEL_OUTOF10: 2
PAINLEVEL_OUTOF10: 3
PAINLEVEL_OUTOF10: 7
PAINLEVEL_OUTOF10: 0

## 2021-06-25 NOTE — PLAN OF CARE
Problem: Pain:  Goal: Pain level will decrease  Description: Pain level will decrease  6/25/2021 0005 by Shwetha Mtz RN  Outcome: Ongoing  6/24/2021 1745 by Donavan Mcelroy RN  Outcome: Ongoing  Goal: Control of acute pain  Description: Control of acute pain  6/25/2021 0005 by Shwetha Mtz RN  Outcome: Ongoing  6/24/2021 1745 by Donavan Mcelroy RN  Outcome: Ongoing  Goal: Control of chronic pain  Description: Control of chronic pain  6/25/2021 0005 by Shwetha Mtz RN  Outcome: Ongoing  6/24/2021 1745 by Donavan Mcelroy RN  Outcome: Ongoing     Problem: Discharge Planning:  Goal: Discharged to appropriate level of care  Description: Discharged to appropriate level of care  6/25/2021 0005 by Shwetha Mtz RN  Outcome: Ongoing  6/24/2021 1745 by Donavan Mcelroy RN  Outcome: Ongoing     Problem: Mobility - Impaired:  Goal: Mobility will improve  Description: Mobility will improve  6/25/2021 0005 by Shwetha Mtz RN  Outcome: Ongoing  6/24/2021 1745 by Donavan Mcelroy RN  Outcome: Ongoing     Problem: Infection - Surgical Site:  Goal: Will show no infection signs and symptoms  Description: Will show no infection signs and symptoms  6/25/2021 0005 by Shwetha Mtz RN  Outcome: Ongoing  6/24/2021 1745 by Donavan Mcelroy RN  Outcome: Ongoing     Problem: Pain - Acute:  Goal: Pain level will decrease  Description: Pain level will decrease  6/25/2021 0005 by Shwetha Mtz RN  Outcome: Ongoing  6/24/2021 1745 by Donavan Mcelroy RN  Outcome: Ongoing

## 2021-06-25 NOTE — PROGRESS NOTES
Physical Therapy  Inpatient Physical Therapy Daily Treatment Note    Unit: [address]  Date:  2021  Patient Name:    Cade Burt  Admitting diagnosis:  Primary osteoarthritis of left knee [M17.12]  Admit Date:  2021  Precautions/Restrictions:  Fall risk, Bed/chair alarm, Lines -IV, Telemetry, Continuous pulse oximetry, WB Restrictions (FWB LLE) and Knee immobilizer      Discharge Recommendations: SNF  DME needs for discharge: defer to facility       Therapy recommendation for EMS Transport: can transport by wheelchair    Therapy recommendations for staff:   Assist of 1 with use of rolling walker (RW), knee immobilizer and gait belt for all transfers and ambulation within room    History of Present Illness: 56 y. o. female with a PMH of HTN, Anxiety, Depression, ESTHER, Morbid Obesity, s/p laparoscopic sleeve gastrectomy who presents for elective left total knee arthoplasty by Dr. Farhana Robledo 2021. Underwent surgery uneventfully and was transferred to floor for further post operative management. Pt mentions to me that pain is controlled well  and worsens with movement. Chest CT performed 2021: no evidence of PE or acute pulmonary abnormality    Home Health S4 Level Recommendation: NA  AM-PAC Mobility Score   AM-PAC Inpatient Mobility Raw Score : 17       Treatment Time:  13:25 - 14:05  Treatment number: 5  Timed Code Treatment Minutes: 40 minutes  Total Treatment Minutes:  40  minutes    Cognition    A&O x4   Able to follow 2 step commands    Subjective  Patient sitting up in chair with family at bedside   Pt agreeable to this PT tx.      Pain   Yes  Location: L knee  Ratin/10  Pain Medicine Status: Pain med requested and RN notified     Bed Mobility   Supine to Sit:    Not Tested  Sit to Supine:   SBA  Rolling:   Not Tested  Scooting:   SBA    Transfer Training     Sit to stand:   SBA   Stand to sit:   SBA  Bed to Chair:   Not Tested with use of N/A    Gait Training gait completed as indicated below  Distance:      20 ft  Deviations (firm surface/linoleum):  decreased amada, increased CHINO, step to pattern and antalgic pattern  Assistive Device Used:    gait belt, rolling walker (RW) and knee immobilizer on left  Level of Assist:    SBA  Comment:     Stair Training not performed this date  Performed on 6/24:  # of Steps:   1x4  Level of Assist:  CGA  UE Support:  NA  Assistive Device:  RW and KI on LLE  Pattern:   non-reciprocal pattern  Comments: verbal cues for technique. SpO2 95% on 5 L,  bpm following stair training    Therapeutic Exercise all completed bilaterally unless indicated  Ankle Pumps x 15 reps  Glut sets x 15 reps  Quad sets x 15 reps  Heel slides x 15 reps with min A + 15 reps with sheet assist  SLR x 15 reps  Hip abduction: x 15 reps    Balance  Sitting:  Good - ; SBA    Standing: Fair +; SBA  Comments:     ROM Measurements   Knee Flexion: 75 degrees  Knee Extension: -3 degrees       Patient Education      Role of PT, POC, Discharge recommendations, safety awareness, transfer techniques, energy conservation, pacing activity, HEP and calling for assist with mobility. Positioning Needs       Pt in bed, no alarm needed, positioned in proper neutral alignment and pressure relief provided. Call light provided and all needs within reach  Ice provided and instructed in proper off/on schedule    Activity Tolerance   Pt completed therapy session with No adverse symptoms noted w/activity. Pt up in BR without O2 when PT arrived ~ SpO2 87% on RA  SpO2 93% on 2L O2    Assessment :  Patient tired throughout tx session and declined amb outside of the room. Focus on LE therex, performed well with multiple rest breaks 2/2 PATRICIO and pain. Progress amb and transfers next visit as pt tolerates.    Recommending SNF upon discharge as patient functioning well below baseline, demonstrates good rehab potential and unable to return home due to inability to negotiate stairs to enter home/bedroom/bathroom, burden of care beyond caregiver ability, home environment not conducive to patient recovery and limited safety awareness. Goals (all goals ongoing unless otherwise indicated)  To be met in 3 visits:  1). Independent with LE Ex x 10 reps     To be met in 6 visits:  1).  Supine to/from sit: Mod A   2).  Sit to/from stand: Min A  from standard sitting surface. 3).  Bed to chair: Min A   4).  Gait: Ambulate 50 ft.  with  SBA and use of LRAD (least restrictive assistive device)  5).  Tolerate B LE exercises 3 sets of 10-15 reps  6).  Ascend/descend 2 steps with Mod A  with use of hand rail unilateral and LRAD (least restrictive assistive device)    Plan   Continue with plan of care. Signature: Vinayak Redd, PT, DPT, OMT-C  #[id number]      If patient discharges from this facility prior to next visit, this note will serve as the Discharge Summary.

## 2021-06-25 NOTE — PLAN OF CARE
Problem: Pain:  Goal: Pain level will decrease  Description: Pain level will decrease  6/25/2021 0006 by Welton Brunner, RN  Outcome: Ongoing  6/25/2021 0005 by Welton Brunner, RN  Outcome: Ongoing  6/24/2021 1745 by Ced Ryan RN  Outcome: Ongoing  Goal: Control of acute pain  Description: Control of acute pain  6/25/2021 0006 by Welton Brunner, RN  Outcome: Ongoing  6/25/2021 0005 by Welton Brunner, RN  Outcome: Ongoing  6/24/2021 1745 by Ced Ryan RN  Outcome: Ongoing  Goal: Control of chronic pain  Description: Control of chronic pain  6/25/2021 0006 by Welton Brunner, RN  Outcome: Ongoing  6/25/2021 0005 by Welton Brunner, RN  Outcome: Ongoing  6/24/2021 1745 by Ced Ryan RN  Outcome: Ongoing     Problem: Discharge Planning:  Goal: Discharged to appropriate level of care  Description: Discharged to appropriate level of care  6/25/2021 0006 by Welton Brunner, RN  Outcome: Ongoing  6/25/2021 0005 by Welton Brunner, RN  Outcome: Ongoing  6/24/2021 1745 by Ced Ryan RN  Outcome: Ongoing     Problem: Mobility - Impaired:  Goal: Mobility will improve  Description: Mobility will improve  6/25/2021 0006 by Welton Brunner, RN  Outcome: Ongoing  6/25/2021 0005 by Welton Brunner, RN  Outcome: Ongoing  6/24/2021 1745 by Ced Ryan RN  Outcome: Ongoing     Problem: Infection - Surgical Site:  Goal: Will show no infection signs and symptoms  Description: Will show no infection signs and symptoms  6/25/2021 0006 by Welton Brunner, RN  Outcome: Ongoing  6/25/2021 0005 by Welton Brunner, RN  Outcome: Ongoing  6/24/2021 1745 by Ced Ryan RN  Outcome: Ongoing     Problem: Pain - Acute:  Goal: Pain level will decrease  Description: Pain level will decrease  6/25/2021 0006 by Welton Brunner, RN  Outcome: Ongoing  6/25/2021 0005 by Welton Brunner, RN  Outcome: Ongoing  6/24/2021 1745 by Ced Ryan RN  Outcome: Ongoing

## 2021-06-25 NOTE — CARE COORDINATION
a Bipap. Natty Maya stated they will order a Bipap for the pt and will obtain the settings from epic; no information requested. She confirmed they have the DME needed for pt    Spoke with pt and her sister at bedside whom pt stated could remain present. Updated on the above and including [facility] [place] ordering a Bipap for her. Pt expressed concerns about the DME; explained [facility] will have the DME such as the Standard Walker or other DME needed. She requested a number to pay her co-pay. Copied her Vaccine card and explained writer will send to [facility]. Pt and sister inquired about transportation. Again explained several times that writer unable to guarantee insurance will be covered and since she is requiring o2 here but not at home, she would need transportation to the SNF unless off o2. Explained that CM can ask the transportation company if they think it will be covered, etc when arranged. They requested to speak with RN CM that they had spoken with in the past. Provided pt with the  number to pay her copay that was provided to writer by admitting and pt aware that they are there until 4 today. RN CM spoke with pt and her sister and directed them to call her insurance about the transportation.  Faxed the Vaccine Card to Natty Trejo at [facility] at [phone] per Felicitas's request.

## 2021-06-25 NOTE — PROGRESS NOTES
Orthopaedic Surgery Progress Note        Date: 6/25/21 [facility] HOSP[facility] Day 4)      CHIEF COMPLAINT: Left knee pain, status post left total knee arthroplasty      HISTORY OF PRESENT ILLNESS:                The patient is a 56 y.o. female who is 4 days status post left total knee arthroplasty conducted by Dr. Mara Brink. She is working well with physical therapy and advancing in strength and range of motion. She has had no difficulties with p.o. intake. Her pain is well controlled on her current regimen. She has no issues with bowel or bladder function. She has required increased O2 via nasal cannula. She denies any numbness or paresthesias. PHYSICAL EXAM:    Blood pressure 115/68, pulse 86, temperature 97.3 °F (36.3 °C), temperature source Oral, resp. rate 16, height 5' 8\" (1.727 m), weight (!) 365 lb 6.4 oz (165.7 kg), SpO2 96 %, not currently breastfeeding. General: Patient is well appearing and cheerful, handle to answer questions appropriately  HEENT: normocephalic, atraumatic   Upper Extremity: Full pain-free ROM    Lower extremity: Dressing in place. Clean dry and intact. Patient able to move ankles equally bilaterally. Calf soft. No evidence of infection  Neuro: A&O x4, no numbness or paresthesias  Vascular: Dorsal pedal pulses 2 bilaterally,      LABS:    Lab Results   Component Value Date/Time    Sodium 138 06/25/2021 05:08 AM    Potassium 3.5 06/25/2021 05:08 AM    Chloride 102 06/25/2021 05:08 AM    CO2 27 06/25/2021 05:08 AM    BUN 11 06/25/2021 05:08 AM    CREATININE <0.5 (L) 06/25/2021 05:08 AM    Glucose 92 06/25/2021 05:08 AM    Calcium 8.7 06/25/2021 05:08 AM       Hemoglobin/Hematocrit:   Lab Results   Component Value Date/Time    Hemoglobin 11.6 (L) 06/24/2021 06:03 AM    Hematocrit 35.5 (L) 06/24/2021 06:03 AM       PT/INR:      IMAGING:  CT CHEST PULMONARY EMBOLISM W CONTRAST   Final Result   No evidence of pulmonary embolism or acute pulmonary abnormality.          XR CHEST (2 VW)   Final Result   Low lung volumes. No acute abnormality. XR KNEE LEFT (1-2 VIEWS)   Final Result   Total knee arthropasty without acute hardware complication. Assessment (Medical Decision Making):     Postop day 4 left total knee arthroplasty by Dr. Cathrine Dandy. Hospital Course and MDM:  Patient is a 39-year-old female admitted to [facility] after a left total knee arthroplasty conducted by Dr. Cathrine Dandy. Patient has been doing well recommend physical therapy and progressing strength and range of motion. She is had some difficulties with increased O2 requirement via nasal cannula. She has planning for discharge to a SNF for further assistance and physical therapy postoperatively. Plan:     1. Left knee pain 4 days status post left total knee arthroplasty  1. Continue current post-op course  2. Currently monitoring BMP daily   3. Continue deep venous thrombosis prophylaxis:   Early mobilization   Foot elevation   Hydration   Intermittent pneumatic compression device (IPC) in place    Aspirin 325 mg PO BID (plan: 6 weeks post-op)  4. Continue physical therapy, weight bearing as tolerated with walker  5. Continue pain control   6. Encourage incentive spirometry q 1h while awake  7. Ice to surgical area for pain and swelling   8. SNF if cleared by PT  9. Follow up with Dr. Cathrine Dandy in his office within 10 - 14 days  10. Do not remove post-op dressing. Dressing change will be conducted by ortho team.  Dressing may be changed if it becomes wet, drainage is noted, or there are signs of infection    11. Continue internal medicine recommendations which are greatly appreciated  12. Contact Orthopedic provider if questions should arise         I discussed the findings in detail and explained my plan. The patient/family is in agreement. Patient did not require an  for this visit.                   Barron Gar PA-C    Physician Assistant - Certified    [facility]    06/25/21 7:00 PM

## 2021-06-25 NOTE — PROGRESS NOTES
Hospitalist Progress Note      PCP: Ashish Orta MD    Date of Admission: 6/21/2021    S/p left total knee replacement   Postop day 4. Subjective:    She  is ambulating with a walker . Plan  SNF placement . Hypoxia is improving, she is currently down to 2 L . Seen by pulmonology . Diuresing well with IV Lasix . denies any chest pain or SOB. no home O2 at baseline. Afebrile, CXR negative      Echocardiogram pending    Medications:  Reviewed    Infusion Medications    sodium chloride 25 mL (06/21/21 1720)     Scheduled Medications    aspirin  325 mg Oral BID    gabapentin  300 mg Oral TID    atorvastatin  20 mg Oral Daily    citalopram  40 mg Oral Nightly    clonazePAM  2 mg Oral Nightly    losartan  50 mg Oral Daily    NIFEdipine  60 mg Oral Daily    omeprazole  20 mg Oral Nightly    traZODone  100 mg Oral Nightly    sodium chloride flush  5-40 mL Intravenous 2 times per day    acetaminophen  650 mg Oral Q6H    sennosides-docusate sodium  1 tablet Oral BID     PRN Meds: ipratropium-albuterol, ketorolac, sodium chloride flush, sodium chloride, oxyCODONE **OR** oxyCODONE, bisacodyl, ondansetron **OR** ondansetron      Intake/Output Summary (Last 24 hours) at 6/25/2021 1226  Last data filed at 6/25/2021 5070  Gross per 24 hour   Intake 240 ml   Output --   Net 240 ml       Physical Exam Performed:    /68   Pulse 86   Temp 97.3 °F (36.3 °C) (Oral)   Resp 16   Ht 5' 8\" (1.727 m)   Wt (!) 365 lb 6.4 oz (165.7 kg)   SpO2 96%   BMI 55.56 kg/m²   General:    awake, alert and oriented. Appears to not be in any distress, morbidly obese. Mucous Membranes:  Pink , anicteric  Chest:  Diminished breath sounds, no wheezes rales or rhonchi  Cardiovascular:  RRR S1S2 heard, no murmurs or gallops  Abdomen:  Soft, undistended, non tender, no organomegaly, BS present  Extremities: Left lower extremity in immobilizer,  no cyanosis.  Distal pulses well felt  Neurological : no focal deficits, moves all extremities, follows commands, no dysarthria       Labs:   Recent Labs     06/23/21  0552 06/24/21  0603   WBC 6.6 6.3   HGB 11.0* 11.6*   HCT 32.9* 35.5*    248     Recent Labs     06/23/21  1101 06/25/21  0508    138   K 4.0 3.5    102   CO2 23 27   BUN 11 11   CREATININE 0.7 <0.5*   CALCIUM 8.7 8.7     Urinalysis:      Lab Results   Component Value Date    NITRU Negative 05/25/2021    WBCUA 23 05/25/2021    BACTERIA 4+ 05/25/2021    RBCUA 0-2 05/25/2021    BLOODU Negative 05/25/2021    SPECGRAV 1.028 05/25/2021    GLUCOSEU Negative 05/25/2021     Radiology:    CT CHEST PULMONARY EMBOLISM W CONTRAST   Final Result   No evidence of pulmonary embolism or acute pulmonary abnormality. XR CHEST (2 VW)   Final Result   Low lung volumes. No acute abnormality. XR KNEE LEFT (1-2 VIEWS)   Final Result   Total knee arthropasty without acute hardware complication. Assessment/Plan:    Active Hospital Problems    Diagnosis     Acute respiratory failure with hypoxia (HCC) [J96.01]     Obesity hypoventilation syndrome (HCC) [E66.2]     Hypoxemia [R09.02]     Chronic hypercapnic respiratory failure (HCC) [J96.12]     Hyperlipidemia [E78.5]     Postoperative hypoxia [R09.02, Z98.890]     Primary osteoarthritis of left knee [M17.12]     ESTHER (obstructive sleep apnea) [G47.33]      Left Knee Osteoarthritis s/p left total knee replacement  - POD#4  - Continue post op management, pain medication to prevent drowsiness. - continue BP meds. Stopped IVF - tolerating diet  - henriquez removed, urinating well  - PT/OT, IS to prevent atelectasis   - CBC daily  - ASA for DVT prophylaxis  - on [facility] Senokot   - plan for SNF    Acute hypoxic resp failure -secondary to OHS/ ESTHER  Underlying chronic hypercapnia  - O2 sats dropped to 61% on RA, placed on O2, associated with hypotension  -Seen by pulmonology  - checked CXR: low lung volumes, no acute abn  - d-dimer: 1218.  CTPA was negative for acute PE  - concern for worsening hypoxia at night, likely 2/2 ESTHER  - wean O2 as able  Continue supplemental oxygen, wean as tolerated. Continue IV Lasix. Check echocardiogram.  BiPAP tonight. Likely plan will be to discharge to SNF with BiPAP. And then outpatient follow-up for sleep study     HTN  - well controlled  - cont Losartan, Nifedipine     HLD  - cont Lipitor     Anxiety  Depression  - cont Celexa, [address] Klonopin and Trazodone     ESTHER  - non-compliant with CPAP     S/p Laparoscopic Sleeve Gastrectomy     GERD  - cont Prilosec     Morbid Obesity  - Body mass index is 55.56 kg/m². - Complicating assessment and treatment. Placing patient at risk for multiple co-morbidities as well as early death and contributing to the patient's presentation.   - Counseled on weight loss.      DVT Prophylaxis: Full dose Lovenox until CT results  Diet: ADULT DIET;  Regular  Code Status: Full Code    PT/OT Eval Status: ordered      Kurtis Ludwig MD12:26 PM 6/25/2021

## 2021-06-25 NOTE — PROGRESS NOTES
Physical Therapy  Inpatient Physical Therapy Daily Treatment Note    Unit: [address]  Date:  2021  Patient Name:    Devin Velazquez  Admitting diagnosis:  Primary osteoarthritis of left knee [M17.12]  Admit Date:  2021  Precautions/Restrictions:  Fall risk, Bed/chair alarm, Lines -IV, Telemetry, Continuous pulse oximetry, WB Restrictions (FWB LLE) and Knee immobilizer      Discharge Recommendations: SNF  DME needs for discharge: defer to facility       Therapy recommendation for EMS Transport: can transport by wheelchair    Therapy recommendations for staff:   Assist of 1 with use of rolling walker (RW), knee immobilizer and gait belt for all transfers and ambulation within room    History of Present Illness: 56 y. o. female with a PMH of HTN, Anxiety, Depression, ESTHER, Morbid Obesity, s/p laparoscopic sleeve gastrectomy who presents for elective left total knee arthoplasty by Dr. Rebeca Contreras 2021. Underwent surgery uneventfully and was transferred to floor for further post operative management. Pt mentions to me that pain is controlled well  and worsens with movement. Chest CT performed 2021: no evidence of PE or acute pulmonary abnormality    Home Health S4 Level Recommendation: NA  AM-PAC Mobility Score   AM-PAC Inpatient Mobility Raw Score : 17       Treatment Time:  17:31-18:05  Treatment number: 6  Timed Code Treatment Minutes: 34 minutes  Total Treatment Minutes:  34  minutes    Cognition    A&O x4   Able to follow 2 step commands    Subjective  Patient lying supine in bed with no family present   Pt agreeable to this PT tx.      Pain   Yes  Location: L knee  Ratin/10 with ROM, minimal pain at rest  Pain Medicine Status: No request made     Bed Mobility   Supine to Sit:    SBA  Sit to Supine:   Not Tested  Rolling:   Not Tested  Scooting:   SBA    Transfer Training     Sit to stand:   SBA   Stand to sit:   SBA  Bed to Chair:   CGA with use of gait belt and standard walker (SW)    Gait Training gait completed as indicated below  Distance:      20 ft  Deviations (firm surface/linoleum):  decreased amada, increased CHINO, step to pattern and antalgic pattern  Assistive Device Used:    gait belt, rolling walker (RW) and knee immobilizer on left  Level of Assist:    CGA  Comment: fair balance, good sequencing  and safety,     Stair Training not performed this date  Performed on 6/24:  # of Steps:   1x4  Level of Assist:  CGA  UE Support:  NA  Assistive Device:  RW and KI on LLE  Pattern:   non-reciprocal pattern  Comments: verbal cues for technique. SpO2 95% on 5 L,  bpm following stair training    Therapeutic Exercise all completed bilaterally unless indicated  Ankle Pumps x 15 reps  Glut sets x 15 reps  Quad sets x 15 reps  Heel slides x 15 reps with min A + 15 reps with sheet assist  SLR x 7 reps with assist  SLR unassisted x 2, using momentum    Balance  Sitting:  Good - ; SBA    Standing: Fair +; CGA  Comments:     ROM Measurements   Knee Flexion: 75 degrees  Knee Extension: -3 degrees       Patient Education      Role of PT, POC, Discharge recommendations, safety awareness, transfer techniques, energy conservation, pacing activity, HEP and calling for assist with mobility. Positioning Needs       Patient up in chair, all needs in reach, no alarm needed. Activity Tolerance   Pt completed therapy session with No adverse symptoms noted w/activity. Spo2 94  Wearing 2 L     Assessment :  Paatient tolerated this session well, with fatigue at the conclusion. Patient continues to have difficulty completing SLR without assist,therefre recommending continued use of KI with ambulation.   Recommending SNF upon discharge as patient functioning well below baseline, demonstrates good rehab potential and unable to return home due to inability to negotiate stairs to enter home/bedroom/bathroom, burden of care beyond caregiver ability, home environment not conducive to patient recovery and limited safety awareness. Goals (all goals ongoing unless otherwise indicated)  To be met in 3 visits:  1). Independent with LE Ex x 10 reps     To be met in 6 visits:  1).  Supine to/from sit: Mod A ,upgrade to supervision  2).  Sit to/from stand: Min A  from standard sitting surface. 3).  Bed to chair: Min A ,upgrade to SBA  4).  Gait: Ambulate 50 ft.  with  SBA and use of LRAD (least restrictive assistive device)  5).  Tolerate B LE exercises 3 sets of 10-15 reps  6).  Ascend/descend 2 steps with Mod A  with use of hand rail unilateral and LRAD (least restrictive assistive device)    Plan   Continue with plan of care. Linda Stone, PT #[id number]      If patient discharges from this facility prior to next visit, this note will serve as the Discharge Summary.

## 2021-06-25 NOTE — PROGRESS NOTES
Pulmonary Progress Note    CC: Hypoxia    Subjective:   Feels better   Responding well to Lasix      No intake or output data in the 24 hours ending 06/25/21 0710    Exam:   /76   Pulse 67   Temp 97.2 °F (36.2 °C) (Oral)   Resp 16   Ht 5' 8\" (1.727 m)   Wt (!) 365 lb 6.4 oz (165.7 kg)   SpO2 90%   BMI 55.56 kg/m²  on 2LPM   Gen: No distress. Eyes: PERRL. No sclera icterus. No conjunctival injection. ENT: No discharge. Pharynx clear. Neck: Trachea midline. Normal thyroid. Resp: No accessory muscle use. Few crackles. No wheezes. No rhonchi. No dullness on percussion. CV: Regular rate. Regular rhythm. No murmur or rub. 1+ LE edema. GI: Non-tender. Non-distended. No masses. No organomegaly. Normal bowel sounds. No hernia. Skin: Warm and dry. No nodule on exposed extremities. No rash on exposed extremities. Lymph: No cervical LAD. No supraclavicular LAD. M/S: No cyanosis. No joint deformity. No clubbing. Neuro: Awake. Moves all extremities. CN grossly intact. Psych: Oriented x 3. No anxiety or agitation.      Scheduled Meds:   aspirin  325 mg Oral BID    gabapentin  300 mg Oral TID    atorvastatin  20 mg Oral Daily    citalopram  40 mg Oral Nightly    clonazePAM  2 mg Oral Nightly    losartan  50 mg Oral Daily    NIFEdipine  60 mg Oral Daily    omeprazole  20 mg Oral Nightly    traZODone  100 mg Oral Nightly    sodium chloride flush  5-40 mL Intravenous 2 times per day    acetaminophen  650 mg Oral Q6H    sennosides-docusate sodium  1 tablet Oral BID     Continuous Infusions:   sodium chloride 25 mL (06/21/21 1720)     PRN Meds:  ipratropium-albuterol, ketorolac, sodium chloride flush, sodium chloride, oxyCODONE **OR** oxyCODONE, bisacodyl, ondansetron **OR** ondansetron    Labs:  CBC:   Recent Labs     06/23/21  0552 06/24/21  0603   WBC 6.6 6.3   HGB 11.0* 11.6*   HCT 32.9* 35.5*   MCV 82.2 83.2    248     BMP:   Recent Labs     06/23/21  1101 06/25/21  0508    138   K 4.0 3.5    102   CO2 23 27   BUN 11 11   CREATININE 0.7 <0.5*     LIVER PROFILE: No results for input(s): AST, ALT, LIPASE, BILIDIR, BILITOT, ALKPHOS in the last 72 hours. Invalid input(s): AMYLASE,  ALB  PT/INR: No results for input(s): PROTIME, INR in the last 72 hours. APTT: No results for input(s): APTT in the last 72 hours. UA:No results for input(s): NITRITE, COLORU, PHUR, LABCAST, WBCUA, RBCUA, MUCUS, TRICHOMONAS, YEAST, BACTERIA, CLARITYU, SPECGRAV, LEUKOCYTESUR, UROBILINOGEN, BILIRUBINUR, BLOODU, GLUCOSEU, AMORPHOUS in the last 72 hours.     Invalid input(s): Kurt Fulton  Recent Labs     06/23/21 2036   PHART 7.352   GOX5GEK 53.0*   PO2ART 53.0*       Cultures:   None    Films:  CT chest 6/24 imaging was reviewed by me and showed   No evidence of PE or acute pulmonary abnormalities     HST 12/15/19 AHI 5.8 desaturation to 76% and spent 223 min < 89%      ASSESSMENT:  · Acute hypoxemic respiratory failure  · Obesity hypoventilation syndrome  · Mild ESTHER  · Chronic hypercapnic respiratory failure-likely underlying obesity hypoventilation syndrome and probable COPD  · Severe nocturnal hypoxia   · Left knee osteoarthritis post left total knee replacement 6/21/2021  · ESTHER-failed CPAP CPAP  · Morbid obesity post gastric sleeve  · Former smoker- Quit smoking 20 years ago, smoked 1-2 ppd for 20-28 years.     PLAN:  · Supplemental oxygen to maintain SaO2 >92%; wean as tolerated  · Inhaled bronchodilators  · Incentive spirometry  · Echo with bubble study  · Trial of Lasix 40 IV daily  · Full night BiPAP titration as an outpatient-we'll try inpatient BiPAP 14/8

## 2021-06-25 NOTE — PROGRESS NOTES
Occupational Therapy  Attempted to see patient this pm, patient unavailable (working with another provider). Will reattempt as patient availability and therapy schedules allow.   Hannah Oropeza, OTR/L [id number]

## 2021-06-25 NOTE — PROGRESS NOTES
RESPIRATORY THERAPY ASSESSMENT    Name:  Toñito Record Number:  [id number]  Age: 56 y.o. Gender: female  : 1960  Today's Date:  2021  Room:  [address] [place] [address]-    Assessment     Is the patient being admitted for a COPD or Asthma exacerbation? No   (If yes the patient will be seen every 4 hours for the first 24 hours and then reassessed)    Patient Admission Diagnosis      Allergies  No Known Allergies    Minimum Predicted Vital Capacity:               Actual Vital Capacity:                    Pulmonary History: ESTHER  Home Oxygen Therapy:  room air  Home Respiratory Therapy:None   Current Respiratory Therapy:  Duoneb Q4W/A. Treatment Type: IS       Respiratory Severity Index(RSI)   Patients with orders for inhalation medications, oxygen, or any therapeutic treatment modality will be placed on Respiratory Protocol. They will be assessed with the first treatment and at least every 72 hours thereafter. The following severity scale will be used to determine frequency of treatment intervention.     Smoking History: Pulmonary Disease or Smoking History, Greater than 15 pack year = 2    Social History  Social History     Tobacco Use    Smoking status: Former Smoker     Packs/day: 1.50     Years: 21.00     Pack years: 31.50     Quit date: 2015     Years since quittin.5    Smokeless tobacco: Never Used    Tobacco comment: still chews nicotine gum 2mg (20 pieces per day)   Substance Use Topics    Alcohol use: No     Alcohol/week: 0.0 standard drinks    Drug use: No       Recent Surgical History: None = 0  Past Surgical History  Past Surgical History:   Procedure Laterality Date    CHOLECYSTECTOMY      COLONOSCOPY      due age 46    COLONOSCOPY  12/10/12    FOOT FRACTURE SURGERY      FOOT SURGERY  2011    Right peroneal tendon Titi Reina); ACP peroneal tendons with US guided injection    JOINT REPLACEMENT Right     knee    KNEE ARTHROSCOPY Left 10/13/2017 Left knee DVA with partial medial menisectomy and loose body removal; internal fixation of insuff. Fx's of the MFT/MTP with bone substitute    SLEEVE GASTRECTOMY  12/08/2017    LAPAROSCOPIC SLEEVE GASTRECTOMY WITH HIATAL HERNIA REPAIR    TONSILLECTOMY  1963    TOTAL KNEE ARTHROPLASTY Left 06/21/2021    TOTAL KNEE ARTHROPLASTY Left 6/21/2021    LEFT TOTAL KNEE REPLACEMENT performed by Jose Mendoza MD at [facility]  12/10/2012    gastritis       Level of Consciousness: Alert, Oriented, and Cooperative = 0    Level of Activity: Walking with assisted = 1    Respiratory Pattern: Regular Pattern; RR 8-20 = 0    Breath Sounds: Diminshed bilaterally and/or crackles = 2    Sputum   ,  ,    Cough: Strong, spontaneous, non-productive = 0    Vital Signs   BP (!) 147/87   Pulse 81   Temp 99 °F (37.2 °C) (Oral)   Resp 18   Ht 5' 8\" (1.727 m)   Wt (!) 365 lb 6.4 oz (165.7 kg)   SpO2 96%   BMI 55.56 kg/m²   SPO2 (COPD values may differ): 90-91% on room air or greater than 92% on FiO2 24- 28% = 1    Peak Flow (asthma only): not applicable = 0    RSI: 5-6 = Q4hr PRN (every four hours as needed) for dyspnea        Plan       Goals: medication delivery    Patient/caregiver was educated on the proper method of use for Respiratory Care Devices:        Level of patient/caregiver understanding able to:   ? Verbalize understanding   ? Demonstrate understanding       ? Teach back        ? Needs reinforcement       ? No available caregiver               ? Other:     Response to education:       Is patient being placed on Home Treatment Regimen? No     Does the patient have everything they need prior to discharge? NA     Comments: Chart reviewed and patient assessed. Plan of Care: Rupesh Smart. Electronically signed by Mila Calderon RCP on 6/24/2021 at 9:52 PM    Respiratory Protocol Guidelines     1.  Assessment and treatment by Respiratory Therapy will be initiated for medication and therapeutic interventions upon initiation of aerosolized medication. 2. Physician will be contacted for respiratory rate (RR) greater than 35 breaths per minute. Therapy will be held for heart rate (HR) greater than 140 beats per minute, pending direction from physician. 3. Bronchodilators will be administered via Metered Dose Inhaler (MDI) with spacer when the following criteria are met:  a. Alert and cooperative     b. HR < 140 bpm  c. RR < 30 bpm                d. Can demonstrate a 2-3 second inspiratory hold  4. Bronchodilators will be administered via Hand Held Nebulizer [facility]) to patients when ANY of the following criteria are met  a. Incognizant or uncooperative          b. Patients treated with HHN at Home        c. Unable to demonstrate proper use of MDI with spacer     d. RR > 30 bpm   5. Bronchodilators will be delivered via Metered Dose Inhaler (MDI), HHN, Aerogen to intubated patients on mechanical ventilation. 6. Inhalation medication orders will be delivered and/or substituted as outlined below. Aerosolized Medications Ordering and Administration Guidelines:    1. All Medications will be ordered by a physician, and their frequency and/or modality will be adjusted as defined by the patients Respiratory Severity Index (RSI) score. 2. If the patient does not have documented COPD, consider discontinuing anticholinergics when RSI is less than 9.  3. If the bronchospasm worsens (increased RSI), then the bronchodilator frequency can be increased to a maximum of every 4 hours. If greater than every 4 hours is required, the physician will be contacted. 4. If the bronchospasm improves, the frequency of the bronchodilator can be decreased, based on the patient's RSI, but not less than home treatment regimen frequency. 5. Bronchodilator(s) will be discontinued if patient has a RSI less than 9 and has received no scheduled or as needed treatment for 72  Hrs.     Patients Ordered on a Mucolytic Agent: 1. Must always be administered with a bronchodilator. 2. Discontinue if patient experiences worsened bronchospasm, or secretions have lessened to the point that the patient is able to clear them with a cough. Anti-inflammatory and Combination Medications:    1. If the patient lacks prior history of lung disease, is not using inhaled anti-inflammatory medication at home, and lacks wheezing by examination or by history for at least 24 hours, contact physician for possible discontinuation.

## 2021-06-26 VITALS
OXYGEN SATURATION: 91 % | TEMPERATURE: 97 F | HEIGHT: 68 IN | WEIGHT: 293 LBS | BODY MASS INDEX: 44.41 KG/M2 | HEART RATE: 84 BPM | RESPIRATION RATE: 18 BRPM | SYSTOLIC BLOOD PRESSURE: 133 MMHG | DIASTOLIC BLOOD PRESSURE: 78 MMHG

## 2021-06-26 LAB
ANION GAP SERPL CALCULATED.3IONS-SCNC: 10 MMOL/L (ref 3–16)
BUN BLDV-MCNC: 13 MG/DL (ref 7–20)
CALCIUM SERPL-MCNC: 8.8 MG/DL (ref 8.3–10.6)
CHLORIDE BLD-SCNC: 102 MMOL/L (ref 99–110)
CO2: 26 MMOL/L (ref 21–32)
CREAT SERPL-MCNC: <0.5 MG/DL (ref 0.6–1.2)
GFR AFRICAN AMERICAN: >60
GFR NON-AFRICAN AMERICAN: >60
GLUCOSE BLD-MCNC: 94 MG/DL (ref 70–99)
LV EF: 58 %
LVEF MODALITY: NORMAL
POTASSIUM SERPL-SCNC: 3.2 MMOL/L (ref 3.5–5.1)
SARS-COV-2, NAAT: NOT DETECTED
SODIUM BLD-SCNC: 138 MMOL/L (ref 136–145)

## 2021-06-26 PROCEDURE — 99232 SBSQ HOSP IP/OBS MODERATE 35: CPT | Performed by: INTERNAL MEDICINE

## 2021-06-26 PROCEDURE — 94660 CPAP INITIATION&MGMT: CPT

## 2021-06-26 PROCEDURE — 94761 N-INVAS EAR/PLS OXIMETRY MLT: CPT

## 2021-06-26 PROCEDURE — 6370000000 HC RX 637 (ALT 250 FOR IP): Performed by: PHYSICIAN ASSISTANT

## 2021-06-26 PROCEDURE — 6360000002 HC RX W HCPCS: Performed by: PHYSICIAN ASSISTANT

## 2021-06-26 PROCEDURE — 93306 TTE W/DOPPLER COMPLETE: CPT

## 2021-06-26 PROCEDURE — 6370000000 HC RX 637 (ALT 250 FOR IP): Performed by: ORTHOPAEDIC SURGERY

## 2021-06-26 PROCEDURE — 6360000002 HC RX W HCPCS: Performed by: INTERNAL MEDICINE

## 2021-06-26 PROCEDURE — 6370000000 HC RX 637 (ALT 250 FOR IP): Performed by: INTERNAL MEDICINE

## 2021-06-26 PROCEDURE — 80048 BASIC METABOLIC PNL TOTAL CA: CPT

## 2021-06-26 PROCEDURE — 87635 SARS-COV-2 COVID-19 AMP PRB: CPT

## 2021-06-26 PROCEDURE — 99233 SBSQ HOSP IP/OBS HIGH 50: CPT | Performed by: INTERNAL MEDICINE

## 2021-06-26 PROCEDURE — 6370000000 HC RX 637 (ALT 250 FOR IP): Performed by: ANESTHESIOLOGY

## 2021-06-26 PROCEDURE — 36415 COLL VENOUS BLD VENIPUNCTURE: CPT

## 2021-06-26 PROCEDURE — 2580000003 HC RX 258: Performed by: ORTHOPAEDIC SURGERY

## 2021-06-26 RX ORDER — POTASSIUM CHLORIDE 20 MEQ/1
20 TABLET, EXTENDED RELEASE ORAL
Status: DISCONTINUED | OUTPATIENT
Start: 2021-06-26 | End: 2021-06-26 | Stop reason: HOSPADM

## 2021-06-26 RX ORDER — GABAPENTIN 300 MG/1
300 CAPSULE ORAL 3 TIMES DAILY
DISCHARGE
Start: 2021-06-26 | End: 2021-07-06 | Stop reason: SDUPTHER

## 2021-06-26 RX ORDER — IPRATROPIUM BROMIDE AND ALBUTEROL SULFATE 2.5; .5 MG/3ML; MG/3ML
3 SOLUTION RESPIRATORY (INHALATION) EVERY 4 HOURS PRN
DISCHARGE
Start: 2021-06-26 | End: 2022-05-12

## 2021-06-26 RX ORDER — SENNA AND DOCUSATE SODIUM 50; 8.6 MG/1; MG/1
1 TABLET, FILM COATED ORAL 2 TIMES DAILY PRN
DISCHARGE
Start: 2021-06-26 | End: 2021-11-11

## 2021-06-26 RX ORDER — SENNA AND DOCUSATE SODIUM 50; 8.6 MG/1; MG/1
1 TABLET, FILM COATED ORAL 2 TIMES DAILY PRN
Status: DISCONTINUED | OUTPATIENT
Start: 2021-06-26 | End: 2021-06-26 | Stop reason: HOSPADM

## 2021-06-26 RX ORDER — FUROSEMIDE 20 MG/1
20 TABLET ORAL DAILY
DISCHARGE
Start: 2021-06-26 | End: 2021-07-06 | Stop reason: SDUPTHER

## 2021-06-26 RX ORDER — POTASSIUM CHLORIDE 20 MEQ/1
20 TABLET, EXTENDED RELEASE ORAL DAILY
DISCHARGE
Start: 2021-06-26 | End: 2021-07-06 | Stop reason: SDUPTHER

## 2021-06-26 RX ORDER — FUROSEMIDE 10 MG/ML
40 INJECTION INTRAMUSCULAR; INTRAVENOUS ONCE
Status: COMPLETED | OUTPATIENT
Start: 2021-06-26 | End: 2021-06-26

## 2021-06-26 RX ORDER — OXYCODONE HYDROCHLORIDE 5 MG/1
5 TABLET ORAL EVERY 4 HOURS PRN
Qty: 10 TABLET | Refills: 0 | Status: SHIPPED | OUTPATIENT
Start: 2021-06-26 | End: 2021-07-01

## 2021-06-26 RX ORDER — CLONAZEPAM 2 MG/1
2 TABLET ORAL NIGHTLY
Qty: 3 TABLET | Refills: 0 | Status: SHIPPED | OUTPATIENT
Start: 2021-06-26 | End: 2022-05-12

## 2021-06-26 RX ADMIN — GABAPENTIN 300 MG: 300 CAPSULE ORAL at 14:53

## 2021-06-26 RX ADMIN — GABAPENTIN 300 MG: 300 CAPSULE ORAL at 09:44

## 2021-06-26 RX ADMIN — KETOROLAC TROMETHAMINE 15 MG: 30 INJECTION, SOLUTION INTRAMUSCULAR; INTRAVENOUS at 09:25

## 2021-06-26 RX ADMIN — POTASSIUM CHLORIDE 20 MEQ: 1500 TABLET, EXTENDED RELEASE ORAL at 11:28

## 2021-06-26 RX ADMIN — ACETAMINOPHEN 650 MG: 325 TABLET ORAL at 14:53

## 2021-06-26 RX ADMIN — FUROSEMIDE 40 MG: 10 INJECTION, SOLUTION INTRAVENOUS at 11:28

## 2021-06-26 RX ADMIN — ACETAMINOPHEN 650 MG: 325 TABLET ORAL at 09:44

## 2021-06-26 RX ADMIN — Medication 10 ML: at 11:28

## 2021-06-26 RX ADMIN — ASPIRIN 325 MG: 325 TABLET, COATED ORAL at 09:44

## 2021-06-26 RX ADMIN — OXYCODONE HYDROCHLORIDE 5 MG: 5 TABLET ORAL at 05:14

## 2021-06-26 ASSESSMENT — PAIN SCALES - GENERAL
PAINLEVEL_OUTOF10: 1
PAINLEVEL_OUTOF10: 7
PAINLEVEL_OUTOF10: 5
PAINLEVEL_OUTOF10: 7

## 2021-06-26 ASSESSMENT — PAIN DESCRIPTION - PAIN TYPE: TYPE: SURGICAL PAIN

## 2021-06-26 ASSESSMENT — PAIN DESCRIPTION - LOCATION: LOCATION: KNEE

## 2021-06-26 ASSESSMENT — PAIN DESCRIPTION - DESCRIPTORS: DESCRIPTORS: ACHING

## 2021-06-26 ASSESSMENT — PAIN DESCRIPTION - ORIENTATION: ORIENTATION: LEFT

## 2021-06-26 NOTE — PROGRESS NOTES
spo2 94% ra at rest. spo2 91% ra with ambulation. Pt denies any difficulty breathing/ shortness of breath. Jaswant Menjivar RN\

## 2021-06-26 NOTE — PROGRESS NOTES
Writer spoke with hospitalist and ortho pa, to verify ok to be transported to rehab by private vehicle, pt and family verbalized understanding that if medical emergency arises during transport to go to ER. Lorna Cueva RN\

## 2021-06-26 NOTE — DISCHARGE INSTR - COC
Continuity of Care Form    Patient Name: Taj Vasques   :  1960  MRN:  [id number]    Admit date:  2021  Discharge date:  21    Code Status Order: Full Code   Advance Directives:   [facility] Documentation       Date/Time Healthcare Directive Type of Healthcare Directive Copy in [address] Agent's Name Healthcare Agent's Phone Number    21 6094  No, patient does not have an advance directive for healthcare treatment -- -- -- -- --    21 1119  No, patient does not have an advance directive for healthcare treatment -- -- -- -- --            Admitting Physician:  Vanessa Larose MD  PCP: Jevon Marks MD    Discharging Nurse: [address] [place] [address] Unit/Room#: 0213/0213-02  Discharging Unit Phone Number: [phone]    Emergency Contact:   Extended Emergency Contact Information  Primary Emergency Contact: Daniel Canseco [address] Phone: [phone]  Relation: Child  Secondary Emergency Contact: Norma Messer [address] Phone: [phone]  Relation: Brother/Sister    Past Surgical History:  Past Surgical History:   Procedure Laterality Date    CHOLECYSTECTOMY      COLONOSCOPY      due age 46    COLONOSCOPY  12/10/12    FOOT FRACTURE SURGERY      FOOT SURGERY  2011    Right peroneal tendon Emi Ocean); ACP peroneal tendons with US guided injection    JOINT REPLACEMENT Right     knee    KNEE ARTHROSCOPY Left 10/13/2017    Left knee DVA with partial medial menisectomy and loose body removal; internal fixation of insuff.  Fx's of the MFT/MTP with bone substitute    SLEEVE GASTRECTOMY  2017    LAPAROSCOPIC SLEEVE GASTRECTOMY WITH HIATAL HERNIA REPAIR    TONSILLECTOMY  1963    TOTAL KNEE ARTHROPLASTY Left 2021    TOTAL KNEE ARTHROPLASTY Left 2021    LEFT TOTAL KNEE REPLACEMENT performed by Vanessa Larose MD at [facility] Isolation            No Isolation          Patient Infection Status       None to display            Nurse Assessment:  Last Vital Signs: /78   Pulse 74   Temp 96.9 °F (36.1 °C) (Oral)   Resp 18   Ht 5' 8\" (1.727 m)   Wt (!) 365 lb 6.4 oz (165.7 kg)   SpO2 99%   BMI 55.56 kg/m²     Last documented pain score (0-10 scale): Pain Level: 5  Last Weight:   Wt Readings from Last 1 Encounters:   06/21/21 (!) 365 lb 6.4 oz (165.7 kg)     Mental Status:  oriented and alert    IV Access:  - None    Nursing Mobility/ADLs:  Walking   Assisted  Transfer  Assisted  Bathing  Assisted  Dressing  Assisted  Toileting  Assisted  Feeding  Independent  Med Admin  Independent  Med Delivery   whole    Wound Care Documentation and Therapy:        Elimination:  Continence:   · Bowel: Yes  · Bladder: Yes  Urinary Catheter: None   Colostomy/Ileostomy/Ileal Conduit: No       Date of Last BM: 6/26/21    Intake/Output Summary (Last 24 hours) at 6/26/2021 1130  Last data filed at 6/26/2021 0331  Gross per 24 hour   Intake 240 ml   Output 650 ml   Net -410 ml     I/O last 3 completed shifts: In: 480 [P.O.:480]  Out: 650 [Urine:650]    Safety Concerns: At Risk for Falls    Impairments/Disabilities:      left total knee replacement    Nutrition Therapy:  Current Nutrition Therapy:   - Oral Diet:  General    Routes of Feeding: Oral  Liquids:  Thin Liquids  Daily Fluid Restriction: no  Last Modified Barium Swallow with Video (Video Swallowing Test): not done    Treatments at the Time of Hospital Discharge:   Respiratory Treatments: see discharge instructions  Oxygen Therapy:  prn 2l  Ventilator:    - BiPAP   IPAP: 14 cmH20, CPAP/EPAP: 7 cmH2O only when sleeping and with 5 L/min oxygen    Rehab Therapies: Physical Therapy and Occupational Therapy  Weight Bearing Status/Restrictions: No weight bearing restirctions  Other Medical Equipment (for information only, NOT a DME order):  walker  Other Treatments: n/a    Patient's personal belongings (please select all that are sent with patient):  clothing, cell phone    RN SIGNATURE:  Electronically signed by Manuel Keita RN on 6/26/21 at 5:38 PM EDT    CASE MANAGEMENT/SOCIAL WORK SECTION    Inpatient Status Date: 6/21/21    Readmission Risk Assessment Score:  Readmission Risk              Risk of Unplanned Readmission:  7           Discharging to Facility/ Agency   Name: Name: [facility]  Address: [address] [place] [address], [address]  Phone: [phone]  Fax: [phone]  ·   · Address:  · Phone:  · Fax:    Dialysis Facility (if applicable)   · Name:  · Address:  · Dialysis Schedule:  · Phone:  · Fax:    / signature: Electronically signed by Livan Fraser RN on 6/26/21 at 3:20 PM EDT    PHYSICIAN SECTION    Prognosis: Good    Condition at Discharge: Stable    Rehab Potential (if transferring to Rehab): Good    Recommended Labs or Other Treatments After Discharge:    Day time O2 2L prn - wean as tolerated   Bipap 14/8 nightly with 40% O2 or 5 L O2 bleed in     Physician Certification: I certify the above information and transfer of Gerardo Smith  is necessary for the continuing treatment of the diagnosis listed and that she requires [place] for less 30 days.      Update Admission H&P: No change in H&P    PHYSICIAN SIGNATURE:  Electronically signed by Jason Peter MD on 6/26/21 at 11:31 AM EDT

## 2021-06-26 NOTE — PROGRESS NOTES
Pulmonary Progress Note    CC: Hypoxia    Subjective:   Continues to improve  Responding to Lasix  O2 trending down        Intake/Output Summary (Last 24 hours) at 6/26/2021 0711  Last data filed at 6/26/2021 0331  Gross per 24 hour   Intake 480 ml   Output 650 ml   Net -170 ml       Exam:   /71   Pulse 83   Temp 96.5 °F (35.8 °C) (Oral)   Resp 18   Ht 5' 8\" (1.727 m)   Wt (!) 365 lb 6.4 oz (165.7 kg)   SpO2 99%   BMI 55.56 kg/m²  on 2LPM   Gen: No distress. Eyes: PERRL. No sclera icterus. No conjunctival injection. ENT: No discharge. Pharynx clear. Neck: Trachea midline. Normal thyroid. Resp: No accessory muscle use. Few crackles. No wheezes. No rhonchi. No dullness on percussion. CV: Regular rate. Regular rhythm. No murmur or rub. 1+ LE edema. GI: Non-tender. Non-distended. No masses. No organomegaly. Normal bowel sounds. No hernia. Skin: Warm and dry. No nodule on exposed extremities. No rash on exposed extremities. Lymph: No cervical LAD. No supraclavicular LAD. M/S: No cyanosis. No joint deformity. No clubbing. Neuro: Awake. Moves all extremities. CN grossly intact. Psych: Oriented x 3. No anxiety or agitation.      Scheduled Meds:   aspirin  325 mg Oral BID    gabapentin  300 mg Oral TID    atorvastatin  20 mg Oral Daily    citalopram  40 mg Oral Nightly    clonazePAM  2 mg Oral Nightly    losartan  50 mg Oral Daily    NIFEdipine  60 mg Oral Daily    omeprazole  20 mg Oral Nightly    traZODone  100 mg Oral Nightly    sodium chloride flush  5-40 mL Intravenous 2 times per day    acetaminophen  650 mg Oral Q6H    sennosides-docusate sodium  1 tablet Oral BID     Continuous Infusions:   sodium chloride 25 mL (06/21/21 1720)     PRN Meds:  ipratropium-albuterol, ketorolac, sodium chloride flush, sodium chloride, oxyCODONE **OR** oxyCODONE, bisacodyl, ondansetron **OR** ondansetron    Labs:  CBC:   Recent Labs     06/24/21  0603   WBC 6.3   HGB 11.6*   HCT 35.5*   MCV 83.2        BMP:   Recent Labs     06/23/21  1101 06/25/21  0508 06/26/21  0536    138 138   K 4.0 3.5 3.2*    102 102   CO2 23 27 26   BUN 11 11 13   CREATININE 0.7 <0.5* <0.5*     LIVER PROFILE: No results for input(s): AST, ALT, LIPASE, BILIDIR, BILITOT, ALKPHOS in the last 72 hours. Invalid input(s): AMYLASE,  ALB  PT/INR: No results for input(s): PROTIME, INR in the last 72 hours. APTT: No results for input(s): APTT in the last 72 hours. UA:No results for input(s): NITRITE, COLORU, PHUR, LABCAST, WBCUA, RBCUA, MUCUS, TRICHOMONAS, YEAST, BACTERIA, CLARITYU, SPECGRAV, LEUKOCYTESUR, UROBILINOGEN, BILIRUBINUR, BLOODU, GLUCOSEU, AMORPHOUS in the last 72 hours. Invalid input(s): Hao Negus  Recent Labs     06/23/21 2036   PHART 7.352   OBT1BFN 53.0*   PO2ART 53.0*       Cultures:   None    Films:  CT chest 6/24 imaging was reviewed by me and showed   No evidence of PE or acute pulmonary abnormalities     HST 12/15/19 AHI 5.8 desaturation to 76% and spent 223 min < 89%        Echocardiogram 6/22/2021   Left ventricular systolic function is normal with ejection fraction   estimated at 55-60 %. No regional wall motion abnormalities are noted. Sigmoid septum is present with normal thickness of remaining left   ventricular wall segments. Normal left ventricular diastolic filling pressure. The aortic root is mildly dilated. 4.0 cm   The right atrium is mildly dilated. Unable to obtain SPAP due to lack of tricuspid regurgitation.    A bubble study was performed and fails to show obvious evidence of shunting      ASSESSMENT:  · Acute hypoxemic respiratory failure-  unremarkable echo with negative bubble study  · Obesity hypoventilation syndrome  · Mild ESTHER  · Chronic hypercapnic respiratory failure-likely underlying obesity hypoventilation syndrome and probable COPD  · Severe nocturnal hypoxia   · Left knee osteoarthritis post left total knee replacement 6/21/2021  · ESTHER-failed CPAP

## 2021-06-26 NOTE — CARE COORDINATION
DISCHARGE ORDER  Date/Time 2021 4:17 PM  Completed by: Susanna Gordillo RN, Case Management    Patient Name: Ace Jasso    : 1960      Admit order Date and Status: INPT 21  Noted discharge order. (verify MD's last order for status of admission/Traditional Medicare 3 [place] Inpatient qualifying stay required for SNF)    Confirmed discharge plan with:              Patient:  Yes              When pt confirms DC plan does any support person need to be contacted by CM Yes if yes who_pt family at bedside_____                      Discharge to Facility: [facility]  phone number for staff giving report: [phone]   Pre-certification completed: yes   Hospital Exemption Notification (HENS) completed: yes   Discharge orders and Continuity of Care faxed to facility:  yes      Transportation:               Medical Transport explained with choice list offered to pt/family. Choice:family and pt choose to transport pt  Agency used: family car            Comments:pt and pt family concerned about transportation cost/insurance coverage and request that family transport pt via family vehicle. Pt nurse, Bela GREGORY, confirmed this is ok with Dr. Naomi Rueda and covering Ortho. TONY also spoke with [facility] with [facility] who states this is ok. [facility] also states that they are still attempting to get pt bipap to facility today. [facility] states that their contract with their previous provider ended yesterday, 21, and they do not have a current provider for bipap needs. [facility] states that she is working with facility DON to get bipap in facility today and will notify pt nurse on [facility] when they obtain it or will notify nurse if cannot obtain it today. All appropriate paperwork faxed to [facility] for discharge today. Pt is being d/c'd to SNF today. Pt's O2 sats are 91% on roomair.     Discharge timeout done with Bela RN. All discharge needs and concerns addressed. Discharging nurse to complete TERE, reconcile AVS, and place final copy with patient's discharge packet. Discharging RN to ensure that written prescriptions for  Level II medications are sent with patient to the facility as per protocol.

## 2021-06-26 NOTE — PROGRESS NOTES
Physical Therapy  Patient reported increased pain after transferring several times getting to and while at [facility]. Transport did not use KI with patient . Contacted [facility] [place] via [facility], who visited with patient and reported that increased pain was due to increased activity without KI. Re-attempted at 15:00-patient declined, stating she was transferring to rehab.   Kelsi Evans, PT #[id number]

## 2021-06-26 NOTE — PLAN OF CARE
Problem: Pain:  Description: Pain management should include both nonpharmacologic and pharmacologic interventions.   Goal: Pain level will decrease  Description: Pain level will decrease  Outcome: Completed  Goal: Control of acute pain  Description: Control of acute pain  Outcome: Completed  Goal: Control of chronic pain  Description: Control of chronic pain  Outcome: Completed     Problem: Discharge Planning:  Goal: Discharged to appropriate level of care  Description: Discharged to appropriate level of care  Outcome: Completed     Problem: Mobility - Impaired:  Goal: Mobility will improve  Description: Mobility will improve  Outcome: Completed     Problem: Infection - Surgical Site:  Goal: Will show no infection signs and symptoms  Description: Will show no infection signs and symptoms  Outcome: Completed

## 2021-06-26 NOTE — DISCHARGE SUMMARY
Date:  2021    Name:  Dangelo Soriano  Address:  [address] [place] [address]    :  1960      Age:   56 y.o.    SSN:  [id number]      Medical Record Number:  [id number]        Discharge Date:  2021     Admitting Physician  Aurelia Turcios MD     Discharge Physician:  Mark Talley PA-C     Admission Diagnoses:  Primary osteoarthritis of left knee [M17.12]     Discharge Diagnoses: Active Hospital Problems    Diagnosis Date Noted    Acute respiratory failure with hypoxia ([address] [J96.01]     Obesity hypoventilation syndrome (HCC) [E66.2]     Hypoxemia [R09.02]     Chronic hypercapnic respiratory failure (HCC) [J96.12]     Hyperlipidemia [E78.5]     Postoperative hypoxia [R09.02, Z98.890]     Primary osteoarthritis of left knee [M17.12] 2017    ESTHER (obstructive sleep apnea) [G47.33]         Discharge Condition:  Good      HISTORY OF PRESENT ILLNESS:              The patient is a 56 y.o. female who was admitted to Replaced by [facility] status post left total knee arthroplasty. Patient worked with physical therapy and increased her strength and range of motion. PHYSICAL EXAM:    Blood pressure 129/78, pulse 74, temperature 96.9 °F (36.1 °C), temperature source Oral, resp. rate 18, height 5' 8\" (1.727 m), weight (!) 365 lb 6.4 oz (165.7 kg), SpO2 99 %, not currently breastfeeding. General: Patient is well appearing and cheerful, able to answer questions appropriately  Lower extremity: Adhesive dressing in place. Wound clean dry and intact after dressing placement. No signs of infection.   Neuro: A&O x4, no numbness or paresthesias  Vascular: Dorsal pedal pulses 2 bilaterally,     LABS:    Lab Results   Component Value Date/Time    Sodium 138 2021 05:36 AM    Potassium 3.2 (L) 2021 05:36 AM    Chloride 102 2021 05:36 AM    CO2 26 2021 05:36 AM    BUN 13 2021 05:36 AM    CREATININE <0.5 (L) 2021 05:36 AM    Glucose 94 2021 05:36 AM Calcium 8.8 06/26/2021 05:36 AM       Hemoglobin/Hematocrit:   Lab Results   Component Value Date/Time    Hemoglobin 11.6 (L) 06/24/2021 06:03 AM    Hematocrit 35.5 (L) 06/24/2021 06:03 AM       PT/INR:      IMAGING:  CT CHEST PULMONARY EMBOLISM W CONTRAST   Final Result   No evidence of pulmonary embolism or acute pulmonary abnormality. XR CHEST (2 VW)   Final Result   Low lung volumes. No acute abnormality. XR KNEE LEFT (1-2 VIEWS)   Final Result   Total knee arthropasty without acute hardware complication. Procedures:  Left total knee arthroplasty      Hospital Course: This is a 56 y.o. female with a history of left knee arthritis that failed conservative treatment. The patient elected to undergo TKA after discussing the risks, benefits, and alternatives to surgery. Total knee arthroplasty was performed without complication. Post op their diet was advanced as tolerated. Pain was controlled with a combination of IV and PO meds. DVT prophylaxis was with a combination of SCDs and aspirin. Patient was thoroughly educated on signs of infection such as fever or should she note erythema or warmth around the surgical site. The patient was educated to contact the office and to go immediately to the emergency department should she note any signs or symptoms. Patient was educated on pain management. She was instructed to use Tylenol for mild pain, Percocet for moderate to severe pain. Patient was thoroughly instructed to avoid taking Tylenol and Percocet as Percocet already contains Tylenol. The patient was medically stable during hospitalization.     Consults:  Internal medicine    Disposition:  [facility]     Meds:     Medication List        START taking these medications      aspirin 325 MG EC tablet  Take 1 tablet by mouth 2 times daily     furosemide 20 MG tablet  Commonly known as: Lasix  Take 1 tablet by mouth daily     gabapentin 300 MG capsule  Commonly known as: NEURONTIN  Take 1 capsule by mouth 3 times daily for 15 days. ipratropium-albuterol 0.5-2.5 (3) MG/3ML Soln nebulizer solution  Commonly known as: DUONEB  Inhale 3 mLs into the lungs every 4 hours as needed for Shortness of Breath     oxyCODONE 5 MG immediate release tablet  Commonly known as: ROXICODONE  Take 1 tablet by mouth every 4 hours as needed for Pain for up to 5 days. potassium chloride 20 MEQ extended release tablet  Commonly known as: KLOR-CON M  Take 1 tablet by mouth daily     sennosides-docusate sodium 8.6-50 MG tablet  Commonly known as: SENOKOT-S  Take 1 tablet by mouth 2 times daily as needed for Constipation            CHANGE how you take these medications      atorvastatin 20 MG tablet  Commonly known as: LIPITOR  TAKE ONE TABLET BY MOUTH DAILY  What changed: See the new instructions. losartan 50 MG tablet  Commonly known as: COZAAR  TAKE ONE TABLET BY MOUTH DAILY  What changed: See the new instructions. NIFEdipine 60 MG extended release tablet  Commonly known as: PROCARDIA XL  TAKE ONE TABLET BY MOUTH DAILY  What changed: See the new instructions. omeprazole 20 MG delayed release capsule  Commonly known as: PRILOSEC  TAKE ONE CAPSULE BY MOUTH EVERY MORNING BEFORE BREAKFAST  What changed: See the new instructions. CONTINUE taking these medications      citalopram 40 MG tablet  Commonly known as: CELEXA     clonazePAM 2 MG tablet  Commonly known as: KLONOPIN  Take 1 tablet by mouth nightly for 3 days. mupirocin 2 % ointment  Commonly known as: Bactroban  APPLY 1/2 INCH TO INSIDE OF EACH NOSTRIL Q 12 HR STARTING 5 DAYS PRIOR TO SURGERY INCLUDING MORNING OF SURGERY.      traZODone 50 MG tablet  Commonly known as: DESYREL               Where to Get Your Medications        You can get these medications from any pharmacy    Bring a paper prescription for each of these medications  clonazePAM 2 MG tablet  oxyCODONE 5 MG immediate release tablet       Information about where to get these medications is not yet available    Ask your nurse or doctor about these medications  aspirin 325 MG EC tablet  furosemide 20 MG tablet  gabapentin 300 MG capsule  ipratropium-albuterol 0.5-2.5 (3) MG/3ML Soln nebulizer solution  potassium chloride 20 MEQ extended release tablet  sennosides-docusate sodium 8.6-50 MG tablet         Patient Instructions: Activity: activity as tolerated and no driving for today, no heavy lifting, pushing, pulling with the implant side for 2 months, and no driving while on analgesics,  ambulate with a walker until specified otherwise, get up and ambulate at least two to three times and hour. Diet: regular diet  Wound Care: keep wound clean and dry, reinforce dressing PRN, and ice to area for comfort, dressing should be left in place and is not to be changed or removed unless drainage is noted, infection is suspected or the bandage becomes wet inside   DVT Prophylaxis: 162mg Aspirin Daily  Follow-up with Physical Therapy as currently scheduled  Pain Management: Tylenol for mild pain and Percocet for moderate to severe pain. Patient being given increased dosage/quantity of opioid pain medication in excess of OSMB guidelines which noted a 30 MED of opioids due to the fact that she has undergone major orthopaedic surgery as outlined in rule 4731-11-13. Dosages and further duration of the pain medication will be re-evaluated at her post op visit. Patient was educated on dosing expectations and limits of prescribing as a result of the new [facility] Board rules enacted August 31, 2017. Please also note that this is not the initial opioid prescription issued to this patient but a continuation of medication utilized during the hospital admission as noted in the medical record. [place]ARRS report has also been utilized to screen for any abuse history or suspicious activity as outlined in [place].   All efforts have been taken to prevent abuse potential and misuse of opioid medications. I discussed the findings in detail and explained my plan. The patient/family is in agreement. Patient did not require an  for this visit. I fully discussed this case in full with Dr. Alyson Lucas.                Madison Solo PA-C    Physician Assistant - [facility] and [address] [place] [address]    06/26/21 12:34 PM

## 2021-06-26 NOTE — PROGRESS NOTES
06/26/21 0315   NIV Type   Equipment Type V60   Mode Bilevel   Mask Type Full face mask   Mask Size Medium   Settings/Measurements   IPAP 14 cmH20   CPAP/EPAP 7 cmH2O   Rate Ordered 16   Resp 18   FiO2  40 %   Vt Exhaled 511 mL   Mask Leak (lpm) 18 lpm   Comfort Level Good   Using Accessory Muscles No   SpO2 98   Alarm Settings   Alarms On Y

## 2021-06-26 NOTE — PROGRESS NOTES
Still wearing c pap. Refusing scheduled tylenol. Discussed that she seems to have slept fairly well with the c pap on and mentioned that most patients get used to c pap and notice that they feel better overall as time goes by. Patient stated \"I hate it so far. \"  Call light in reach.

## 2021-06-26 NOTE — PROGRESS NOTES
06/25/21 2145   NIV Type   $NIV $Daily Charge   Skin Assessment Clean, dry, & intact   Skin Protection for O2 Device Yes   NIV Started/Stopped On   Equipment Type V60   Mode Bilevel   Mask Type Full face mask   Mask Size Medium   Settings/Measurements   IPAP 14 cmH20   CPAP/EPAP 7 cmH2O   Rate Ordered 16   Resp 18   FiO2  40 %   Vt Exhaled 614 mL   Mask Leak (lpm) 9 lpm   Comfort Level Good   Using Accessory Muscles No   SpO2 99   Alarm Settings   Alarms On Y

## 2021-06-26 NOTE — PROGRESS NOTES
See PM shift assessment. States she is only having knee pain while having PT; states she is planning to go to a rehab facility upon d/c ([place]). Cooperative; states she is not looking forward to wearing the bi pap this evening for the first time. States she did not think her sleep apnea was that bad. Call light in reach.

## 2021-06-26 NOTE — PROGRESS NOTES
Report called to [place] pass at [facility]. Pt waiting on sister's arrival for transport for discharge to rehab. Catie Machado RN

## 2021-06-26 NOTE — PROGRESS NOTES
Pt returned to room from ECHO. Pt c/o left knee pain. Medicated with toradol. See mar. Amanda Michael RN\

## 2021-06-26 NOTE — PROGRESS NOTES
Hospitalist Progress Note      PCP: Leonila Morales MD    Date of Admission: 6/21/2021    S/p left total knee replacement   Postop day 5. Subjective:    She  is ambulating with a walker . Plan  SNF placement . Hypoxia is improving, she is currently down to 2 L . Seen by pulmonology . Diuresing well with IV Lasix . denies any chest pain or SOB. no home O2 at baseline. Afebrile, CXR negative       Echocardiogram completed today results reviewed    Patient is doing well today . stable overnight on oxygen 2 L and BiPAP setting. Plan will be likely to discharge to SNF today once seen by pulmonology. .    Medications:  Reviewed    Infusion Medications    sodium chloride 25 mL (06/21/21 1720)     Scheduled Medications    potassium chloride  20 mEq Oral Daily with breakfast    aspirin  325 mg Oral BID    gabapentin  300 mg Oral TID    atorvastatin  20 mg Oral Daily    citalopram  40 mg Oral Nightly    clonazePAM  2 mg Oral Nightly    losartan  50 mg Oral Daily    NIFEdipine  60 mg Oral Daily    omeprazole  20 mg Oral Nightly    traZODone  100 mg Oral Nightly    sodium chloride flush  5-40 mL Intravenous 2 times per day    acetaminophen  650 mg Oral Q6H     PRN Meds: sennosides-docusate sodium, ipratropium-albuterol, ketorolac, sodium chloride flush, sodium chloride, oxyCODONE **OR** oxyCODONE, bisacodyl, ondansetron **OR** ondansetron      Intake/Output Summary (Last 24 hours) at 6/26/2021 1025  Last data filed at 6/26/2021 0331  Gross per 24 hour   Intake 240 ml   Output 650 ml   Net -410 ml       Physical Exam Performed:    /78   Pulse 74   Temp 96.9 °F (36.1 °C) (Oral)   Resp 18   Ht 5' 8\" (1.727 m)   Wt (!) 365 lb 6.4 oz (165.7 kg)   SpO2 99%   BMI 55.56 kg/m²   General:    awake, alert and oriented. Appears to not be in any distress, morbidly obese.   Mucous Membranes:  Pink , anicteric  Chest:  Diminished breath sounds, no wheezes rales or rhonchi  Cardiovascular:  RRR S1S2 heard, no murmurs or gallops  Abdomen:  Soft, undistended, non tender, no organomegaly, BS present  Extremities: Left lower extremity in immobilizer,  no cyanosis. Distal pulses well felt  Neurological : no focal deficits, moves all extremities, follows commands, no dysarthria       Labs:   Recent Labs     06/24/21  0603   WBC 6.3   HGB 11.6*   HCT 35.5*        Recent Labs     06/23/21  1101 06/25/21  0508 06/26/21  0536    138 138   K 4.0 3.5 3.2*    102 102   CO2 23 27 26   BUN 11 11 13   CREATININE 0.7 <0.5* <0.5*   CALCIUM 8.7 8.7 8.8     Urinalysis:      Lab Results   Component Value Date    NITRU Negative 05/25/2021    WBCUA 23 05/25/2021    BACTERIA 4+ 05/25/2021    RBCUA 0-2 05/25/2021    BLOODU Negative 05/25/2021    SPECGRAV 1.028 05/25/2021    GLUCOSEU Negative 05/25/2021     Radiology:    CT CHEST PULMONARY EMBOLISM W CONTRAST   Final Result   No evidence of pulmonary embolism or acute pulmonary abnormality. XR CHEST (2 VW)   Final Result   Low lung volumes. No acute abnormality. XR KNEE LEFT (1-2 VIEWS)   Final Result   Total knee arthropasty without acute hardware complication. Echocardiogram   Summary   Left ventricular systolic function is normal with ejection fraction   estimated at 55-60 %. No regional wall motion abnormalities are noted. Sigmoid septum is present with normal thickness of remaining left   ventricular wall segments. Normal left ventricular diastolic filling pressure. The aortic root is mildly dilated. 4.0 cm   The right atrium is mildly dilated. Unable to obtain SPAP due to lack of tricuspid regurgitation. A bubble study was performed and fails to show obvious evidence of shunting.          Assessment/Plan:    Active Hospital Problems    Diagnosis     Acute respiratory failure with hypoxia ([address] [J96.01]     Obesity hypoventilation syndrome ([address] [E66.2]     Hypoxemia [R09.02]     Chronic hypercapnic respiratory failure (HCC) [J96.12]     Hyperlipidemia [E78.5]     Postoperative hypoxia [R09.02, Z98.890]     Primary osteoarthritis of left knee [M17.12]     ESTHER (obstructive sleep apnea) [G47.33]      Left Knee Osteoarthritis s/p left total knee replacement  - POD#5  - Continue post op management, pain medication to prevent drowsiness. - continue BP meds. Stopped IVF - tolerating diet  - henriquez removed, urinating well  - PT/OT, IS to prevent atelectasis   - CBC daily  - ASA for DVT prophylaxis  - on [facility] Senokot   - plan for SNF    Acute hypoxic resp failure -secondary to OHS/ ESTHER  Underlying chronic hypercapnia  - O2 sats dropped to 61% on RA, placed on O2, associated with hypotension  -Seen by pulmonology  - checked CXR: low lung volumes, no acute abn  - d-dimer: 1218. CTPA was negative for acute PE  - concern for worsening hypoxia at night, likely 2/2 ESTHER  - wean O2 as able  Continue supplemental oxygen,2L,  wean as tolerated. Continue  Lasix. Checked echocardiogram.  BiPAP qhs   plan will be to discharge to SNF with BiPAP. And then outpatient follow-up for sleep study    Hypokalemia    - replete    HTN  - well controlled  - cont Losartan, Nifedipine     HLD  - cont Lipitor     Anxiety  Depression  - cont Celexa, [facility] Klonopin and Trazodone     ESTHER  - non-compliant with CPAP     S/p Laparoscopic Sleeve Gastrectomy     GERD  - cont Prilosec     Morbid Obesity  - Body mass index is 55.56 kg/m². - Complicating assessment and treatment. Placing patient at risk for multiple co-morbidities as well as early death and contributing to the patient's presentation.   - Counseled on weight loss.      DVT Prophylaxis: Full dose Lovenox until CT results  Diet: ADULT DIET; Regular  Code Status: Full Code    PT/OT Eval Status: ordered    Patient medically stable to be discharged to SNF. .  Med rec reviewed and updated. Galdino Breaux BiPAP orders placed on TERE. . BiPAP 14/8 nightly, with the bleed in  Oxygen of 5 L nightly.   O2 2 L during the day as needed, wean as tolerated.   Patient to follow-up with pulmonology on discharge from [facility], MD10:25 AM 6/26/2021

## 2021-06-28 ENCOUNTER — TELEPHONE (OUTPATIENT)
Dept: ORTHOPEDIC SURGERY | Age: 61
End: 2021-06-28

## 2021-06-28 ENCOUNTER — TELEPHONE (OUTPATIENT)
Dept: PULMONOLOGY | Age: 61
End: 2021-06-28

## 2021-06-28 DIAGNOSIS — G47.33 OSA (OBSTRUCTIVE SLEEP APNEA): Primary | ICD-10-CM

## 2021-06-28 NOTE — TELEPHONE ENCOUNTER
Spoke with pt, doing pretty well. [facility] did not have patients medications over the weekend, very frustrating for pt. Had a wonderful experience at [facility], wants to nominate nurse Bela for a Girish Villalobos award for all her care. Incision status: No drainage or redness    Edema/Swelling/Teds: Has been icing frequently. Pain level and status: Managing pretty well. Use of pain medications: Using pain meds as needed. Blood thinner: ASA- no issues    Bowels: Moving fine, stopped stool softener. Home Care Agency active: Getting PT and everything is going very well. Outpatient therapy: NA    Do you have all of your medications: Yes    Changes in medications: no    Ortho Vitals: NA    Instructed pt to call Nurse Navigator or surgeon's office with any questions or concerns.      Follow up appointments:    Future Appointments   Date Time Provider Yesenia Saab   7/13/2021  3:30 PM Manjula Capone MD [facility] Susie Aponte [facility]

## 2021-06-28 NOTE — TELEPHONE ENCOUNTER
Kim called and said that the patient is going to have to have a titration in order to be set up on a BIPAP

## 2021-06-28 NOTE — PROGRESS NOTES
Physician Progress Note      PATIENT:               Rebecca Tolliver  CSN #:                  [id number]  :                       1960  ADMIT DATE:       2021 7:23 AM  DISCH DATE:        2021 6:30 PM  RESPONDING  PROVIDER #:        Krista CLAYTON          QUERY TEXT:    Pt admitted with osteoarthritis. Noted documentation of \"acute hypoxic resp   failure\" by Internal Medicine consultant. If possible, please document in   progress notes and discharge summary:    The medical record reflects the following:  Risk Factors: s/p total knee replacement, obstructive sleep apnea and   non-compliant with CPAP  Clinical Indicators: RR 13-20, SPO2 %, 2-10 L NC/simple mask  Treatment: supplemental oxygen, monitor O2 sats, supportive care    Thank you,  Crissy Villasenor RN, [facility]  [phone]  Options provided:  -- Acute hypoxic respiratory failure confirmed not present on admission  -- Acute hypoxic respiratory failure ruled out  -- Other - I will add my own diagnosis  -- Disagree - Not applicable / Not valid  -- Disagree - Clinically unable to determine / Unknown  -- Refer to Clinical Documentation Reviewer    PROVIDER RESPONSE TEXT:    Provider is clinically unable to determine a response to this query.     Query created by: Saturnino Santos on 2021 2:06 PM      Electronically signed by:  Krista CLAYTON 2021 10:38 AM

## 2021-06-28 NOTE — PROGRESS NOTES
Physician Progress Note      PATIENT:               Alexa Ye  CSN #:                  [id number]  :                       1960  ADMIT DATE:       2021 7:23 AM  DISCH DATE:        2021 6:30 PM  RESPONDING  PROVIDER #:        Joe CLAYTON          QUERY TEXT:    Pt admitted with osteoarthritis. Noted documentation of \"acute hypoxic resp   failure\" by Internal Medicine consultant. If possible, please document in   progress notes and discharge summary:    The medical record reflects the following:  Risk Factors: s/p total knee replacement, obstructive sleep apnea and   non-compliant with CPAP  Clinical Indicators: RR 13-20, SPO2 %, 2-10 L NC/simple mask  Treatment: supplemental oxygen, monitor O2 sats, supportive care    Thank you,  Felix Hendricks RN, [facility]  [phone]  Options provided:  -- Defer to internal medicine consultant documentation regarding acute hypoxic   respiratory failure  -- Acute hypoxic respiratory failure confirmed not present on admission  -- Acute hypoxic respiratory failure ruled out  -- Other - I will add my own diagnosis  -- Disagree - Not applicable / Not valid  -- Disagree - Clinically unable to determine / Unknown  -- Refer to Clinical Documentation Reviewer    PROVIDER RESPONSE TEXT:    I defer to internal medicine consultant regarding documentation of acute   hypoxic respiratory failure.     Query created by: Ginny Mathis on 2021 11:37 AM      Electronically signed by:  Joe CLAYTON 2021 2:17 PM

## 2021-06-28 NOTE — TELEPHONE ENCOUNTER
Patient is in [address] following a knee surgery, she is on Bipap there and they told her she could not go home without one. She is pending discharge for Friday 07/02/21, she also wanted Dr. Maxime Nathan to be aware that she was without her medication for 2 days in the nursing home.

## 2021-06-28 NOTE — TELEPHONE ENCOUNTER
Orders pended, spoke to Brinda DAVIS from [facility] who states that she can do setup at nursing home for patient prior to discharge

## 2021-06-28 NOTE — TELEPHONE ENCOUNTER
MD Rafael Alanis MA  Full night BiPAP titration         LOV: 6/26/21    ASSESSMENT:  · Acute hypoxemic respiratory failure-  unremarkable echo with negative bubble study  · Obesity hypoventilation syndrome  · Mild ESTHER  · Chronic hypercapnic respiratory failure-likely underlying obesity hypoventilation syndrome and probable COPD  · Severe nocturnal hypoxia   · Left knee osteoarthritis post left total knee replacement 6/21/2021  · ESTHER-failed CPAP CPAP  · Morbid obesity post gastric sleeve  · Former smoker- Quit smoking 20 years ago, smoked 1-2 ppd for 20-28 years.     PLAN:  · Supplemental oxygen to maintain SaO2 >92%; wean as tolerated  · Continue BiPAP nightly 14/8-we will need prescription to use BiPAP at the nursing home same setting  · Inhaled bronchodilators  · Incentive spirometry  · Continue Lasix 40 IV daily  · Full night BiPAP titration as an outpatient (Office is  notified)

## 2021-06-30 DIAGNOSIS — Z96.652 STATUS POST TOTAL LEFT KNEE REPLACEMENT: Primary | ICD-10-CM

## 2021-06-30 RX ORDER — OXYCODONE HYDROCHLORIDE AND ACETAMINOPHEN 5; 325 MG/1; MG/1
1 TABLET ORAL EVERY 6 HOURS PRN
Qty: 28 TABLET | Refills: 0 | Status: SHIPPED | OUTPATIENT
Start: 2021-06-30 | End: 2021-07-07

## 2021-06-30 RX ORDER — TIZANIDINE 4 MG/1
4 TABLET ORAL EVERY 6 HOURS PRN
Qty: 30 TABLET | Refills: 0 | Status: SHIPPED | OUTPATIENT
Start: 2021-06-30 | End: 2022-05-12 | Stop reason: SDUPTHER

## 2021-06-30 NOTE — TELEPHONE ENCOUNTER
Hernán with patients sister Jacques Ip informed that since she was non-compliant with her cpap her insurance requires her to have titraiton split night or PSG. Patient and sister have decided they want to just purchase out of pocket aware of cost.  Kim to call sister with payment and then RT from [address] will got [facility] today after 5pm to set her up prior to discharge tomorrow. Will f/u tomorrow to make sure everything was set up today today.

## 2021-06-30 NOTE — TELEPHONE ENCOUNTER
Pt called stating that she is going to be leaving [address] home Friday, no exceptions. Pt states that if she doesn't get her PAP machine prior to leaving, she will be signing an [place] form. Lesvia Del Toro called Kim and spoke with Hema whom is going to look further into records, and pt has documented CPAP failure. Pt asking for office to call her back today with a response if BIPAP is going to be able to be set up.

## 2021-07-01 NOTE — TELEPHONE ENCOUNTER
Spoke with [facility] whom states that [facility] RT set patient up yesterday 6/30/21 after work.

## 2021-07-06 ENCOUNTER — TELEPHONE (OUTPATIENT)
Dept: FAMILY MEDICINE CLINIC | Age: 61
End: 2021-07-06

## 2021-07-06 DIAGNOSIS — F41.9 ANXIETY: ICD-10-CM

## 2021-07-06 RX ORDER — POTASSIUM CHLORIDE 20 MEQ/1
20 TABLET, EXTENDED RELEASE ORAL DAILY
Qty: 60 TABLET | Refills: 1 | Status: SHIPPED | OUTPATIENT
Start: 2021-07-06

## 2021-07-06 RX ORDER — GABAPENTIN 300 MG/1
300 CAPSULE ORAL 3 TIMES DAILY
Qty: 90 CAPSULE | Refills: 0 | Status: SHIPPED | OUTPATIENT
Start: 2021-07-06 | End: 2022-05-12

## 2021-07-06 RX ORDER — FUROSEMIDE 20 MG/1
20 TABLET ORAL DAILY
Qty: 60 TABLET | Refills: 1 | Status: SHIPPED | OUTPATIENT
Start: 2021-07-06 | End: 2021-11-12

## 2021-07-06 NOTE — TELEPHONE ENCOUNTER
----- Message from Renitavaleriy Ambriz sent at 7/3/2021  9:13 AM EDT -----  Subject: Message to Provider    QUESTIONS  Information for Provider? Pt has a cough and green mucus she is concerned   that this will be pneumonia and requests medication . Pt just had a total   knee replacement and is unable to leave home at this time, she has a   appointment coming up 7/7/21.  ---------------------------------------------------------------------------  --------------  [email or web address]  What is the best way for the office to contact you? OK to leave message on   voicemail  Preferred Call Back Phone Number? [phone]  ---------------------------------------------------------------------------  --------------  SCRIPT ANSWERS  Relationship to Patient?  Self

## 2021-07-06 NOTE — TELEPHONE ENCOUNTER
Pt has temp running 100.3 to 100.9  Cough  With green mucous  Does not feel sob, some slight tightness in chest when breathes in and out  o2 sats 91- 92  During the days  but did drop as low at 72% ( but that  was when sleeping and had taken oxycodone and a muscle relaxer)    Just has gotten a bipap  Used for the first time last night- o2 sats dropped to 80s again last night.    Released from snf Thursday s/p knee replacement  Does not have ramps t get out of house yet  Pt has been vaccinated for COVID  Using incentive spirometer   Has not taken anything for the cough or old symptoms

## 2021-07-08 ENCOUNTER — TELEPHONE (OUTPATIENT)
Dept: ORTHOPEDIC SURGERY | Age: 61
End: 2021-07-08

## 2021-07-08 DIAGNOSIS — Z96.652 STATUS POST TOTAL LEFT KNEE REPLACEMENT: Primary | ICD-10-CM

## 2021-07-08 NOTE — TELEPHONE ENCOUNTER
Spoke with pt, left [facility] and back home. Working on getting [address] set up. Has too many steps to get in and out of home for OP PT. Wasn't pleased with [facility] [place]. RN messaged office and Stella Quintero will send referral to [facility] for pt. Pt having cold symptoms with green sputum, oxygen level was 78% when she woke up this morning. Pt states she hasn't had sleep study yet because they are scheduling out for 1-2 months. She purchased bipap out of pocket. Pt has reached out to PCP and they told pt to call if symptoms not better in 2 days. Pt also having swelling behind knee and in calf area. Negative for brewster's sign. RN suggested pt wear compression stockings or use ace wrap from toe to thigh to help with swelling. Pt agreeable. Incision status: No drainage or redness- dressing hasn't been changed since surgery. RN walked pt and sister through how to remove dressing and what to look for. Pt will call if any signs of infection are seen. Will replace dressing only if there is drainage. Edema/Swelling/Teds: Knee is doing well. Has been icing 3 times daily. Pain level and status: Managing pretty well. Use of pain medications: Using as needed. Blood thinner: ASA- no issues. Bowels:     Home Care Agency active: Office is assisting in setting up [address] for patient. Outpatient therapy: NA    Do you have all of your medications: Yes    Changes in medications: no    Ortho Vitals: NA    Instructed pt to call Nurse Navigator or surgeon's office with any questions or concerns. Signed off from pt. Instructed pt to call RN or Surgeon's office with any issues or concerns.     Follow up appointments:    Future Appointments   Date Time Provider Yesenia Saab   7/13/2021  3:30 PM Baldo Vaughn MD [facility]

## 2021-07-13 ENCOUNTER — OFFICE VISIT (OUTPATIENT)
Dept: ORTHOPEDIC SURGERY | Age: 61
End: 2021-07-13

## 2021-07-13 DIAGNOSIS — M25.562 ACUTE PAIN OF LEFT KNEE: ICD-10-CM

## 2021-07-13 DIAGNOSIS — Z96.652 STATUS POST TOTAL LEFT KNEE REPLACEMENT: Primary | ICD-10-CM

## 2021-07-13 PROCEDURE — 99024 POSTOP FOLLOW-UP VISIT: CPT | Performed by: ORTHOPAEDIC SURGERY

## 2021-07-22 ENCOUNTER — TELEPHONE (OUTPATIENT)
Dept: ORTHOPEDIC SURGERY | Age: 61
End: 2021-07-22

## 2021-07-27 DIAGNOSIS — Z96.652 STATUS POST TOTAL LEFT KNEE REPLACEMENT: Primary | ICD-10-CM

## 2021-08-03 ENCOUNTER — HOSPITAL ENCOUNTER (OUTPATIENT)
Dept: PHYSICAL THERAPY | Age: 61
Setting detail: THERAPIES SERIES
Discharge: HOME | End: 2021-08-03
Payer: COMMERCIAL

## 2021-08-03 ENCOUNTER — OFFICE VISIT (OUTPATIENT)
Dept: ORTHOPEDIC SURGERY | Age: 61
End: 2021-08-03

## 2021-08-03 VITALS — HEIGHT: 68 IN | WEIGHT: 293 LBS | BODY MASS INDEX: 44.41 KG/M2

## 2021-08-03 DIAGNOSIS — Z96.652 STATUS POST TOTAL LEFT KNEE REPLACEMENT: Primary | ICD-10-CM

## 2021-08-03 PROCEDURE — 99024 POSTOP FOLLOW-UP VISIT: CPT | Performed by: ORTHOPAEDIC SURGERY

## 2021-08-03 PROCEDURE — 97110 THERAPEUTIC EXERCISES: CPT

## 2021-08-03 PROCEDURE — 97140 MANUAL THERAPY 1/> REGIONS: CPT

## 2021-08-03 PROCEDURE — 97161 PT EVAL LOW COMPLEX 20 MIN: CPT

## 2021-08-03 NOTE — PLAN OF CARE
more.      Relevant Medical History: hx 2 foot sx, gallbladder, tonsils  Functional Disability Index:  LEFS 79%    Pain Scale: 0-6/10  Easing factors: rest  Provocative factors: getting out of lower chairs,      Type: []Constant   []Intermittent  []Radiating []Localized []other:     Numbness/Tingling: denies    Occupation/School: Unemployed    Living Status/Prior Level of Function: Independent with ADLs and IADLs. OBJECTIVE:     ROM LEFT RIGHT   HIP Flex     HIP Abd     HIP Ext     HIP IR     HIP ER     Knee ext Lacking 5 deg 0 0   Knee Flex 98 110   Ankle PF     Ankle DF     Ankle In     Ankle Ev     Strength  LEFT RIGHT   HIP Flexors     HIP Abductors     HIP Ext     Hip ER     Knee EXT (quad) poor    Knee Flex (HS)     Ankle DF     Ankle PF     Ankle Inv     Ankle EV          Circumference  Mid apex  7 cm prox             Reflexes/Sensation:    [x]Dermatomes/Myotomes intact    [x]Reflexes equal and normal bilaterally   []Other:    Joint mobility:     []Normal    []Hypo   []Hyper    Palpation: mild    Functional Mobility/Transfers: I with transfers    Posture: posterior pelvic tilt    Bandages/Dressings/Incisions: inspected by MD    Gait: (include devices/WB status) slight antalgic with [employer]    Orthopedic Special Tests:                         [x] Patient history, allergies, meds reviewed. Medical chart reviewed. See intake form. Review Of Systems (ROS):  [x]Performed Review of systems (Integumentary, CardioPulmonary, Neurological) by intake and observation. Intake form has been scanned into medical record. Patient has been instructed to contact their primary care physician regarding ROS issues if not already being addressed at this time.       Co-morbidities/Complexities (which will affect course of rehabilitation):   []None           Arthritic conditions   []Rheumatoid arthritis (M05.9)  [x]Osteoarthritis (M19.91)   Cardiovascular conditions   [x]Hypertension (I10)  []Hyperlipidemia (E78.5)  []Angina ability or tolerance with driving and/or computer work   [x]Reduced ability to perform lifting, carrying tasks   [x]Reduced ability to squat   [x]Reduced ability to forward bend   [x]Reduced ability to ambulate prolonged functional periods/distances/surfaces   [x]Reduced ability to ascend/descend stairs   [x]Reduced ability to run, hop, cut or jump   []other:    Participation Restrictions   [x]Reduced participation in self care activities   [x]Reduced participation in home management activities   []Reduced participation in work activities   []Reduced participation in social activities. []Reduced participation in sport/recreation activities. Classification :    [x]Signs/symptoms consistent with post-surgical status including decreased ROM, strength and function.    []Signs/symptoms consistent with joint sprain/strain   []Signs/symptoms consistent with patella-femoral syndrome   []Signs/symptoms consistent with knee OA/hip OA   []Signs/symptoms consistent with internal derangement of knee/Hip   []Signs/symptoms consistent with functional hip weakness/NMR control      []Signs/symptoms consistent with tendinitis/tendinosis    []signs/symptoms consistent with pathology which may benefit from Dry needling      []other:      Prognosis/Rehab Potential:      []Excellent   [x]Good    []Fair   []Poor    Tolerance of evaluation/treatment:    []Excellent   [x]Good    []Fair   []Poor    Physical Therapy Evaluation Complexity Justification  [x] A history of present problem with:  [] no personal factors and/or comorbidities that impact the plan of care;  []1-2 personal factors and/or comorbidities that impact the plan of care  [x]3 personal factors and/or comorbidities that impact the plan of care  [x] An examination of body systems using standardized tests and measures addressing any of the following: body structures and functions (impairments), activity limitations, and/or participation restrictions;:  [] a total of 1-2 or more elements   [] a total of 3 or more elements   [x] a total of 4 or more elements   [x] A clinical presentation with:  [x] stable and/or uncomplicated characteristics   [] evolving clinical presentation with changing characteristics  [] unstable and unpredictable characteristics;   [x] Clinical decision making of [x] low, [] moderate, [] high complexity using standardized patient assessment instrument and/or measurable assessment of functional outcome. [x] EVAL (LOW) 58434 (typically 20 minutes face-to-face)  [] EVAL (MOD) 28196 (typically 30 minutes face-to-face)  [] EVAL (HIGH) 61814 (typically 45 minutes face-to-face)  [] RE-EVAL     PLAN:   Frequency/Duration:  1-2 days per week for 12 Weeks:  Interventions:  [x]  Therapeutic exercise including: strength training, ROM, for Lower extremity and core   [x]  NMR activation and proprioception for LE, Glutes and Core   [x]  Manual therapy as indicated for LE, Hip and spine to include: Dry Needling/IASTM, STM, PROM, Gr I-IV mobilizations, manipulation. [x] Modalities as needed that may include: thermal agents, E-stim, Biofeedback, US, iontophoresis as indicated  [x] Patient education on joint protection, postural re-education, activity modification, progression of HEP. HEP instruction: Refer to [address] [place] [address] access code and exercises on the 1st visit treatment note    GOALS:  Patient stated goal: Normal walking without pain. Therapist goals for Patient:   Short Term Goals: To be achieved in: 2 weeks  1. Independent in HEP and progression per patient tolerance, in order to prevent re-injury. [] Progressing: [] Met: [] Not Met: [] Adjusted     2. Patient will have a decrease in pain to facilitate improvement in movement, function, and ADLs as indicated by Functional Deficits. [] Progressing: [] Met: [] Not Met: [] Adjusted     Long Term Goals: To be achieved in: 12 weeks  1.  Disability index score of 37% or less for the LEFS to assist with reaching prior level of function. [] Progressing: [] Met: [] Not Met: [] Adjusted     2. Patient will demonstrate increased AROM to 0-110 to allow for proper joint functioning as indicated by patients Functional Deficits. [] Progressing: [] Met: [] Not Met: [] Adjusted     3. Patient will demonstrate an increase in Strength to good proximal hip strength and control, within 5lb HHD in LE to allow for proper functional mobility as indicated by patients Functional Deficits. [] Progressing: [] Met: [] Not Met: [] Adjusted     4. Patient will return to functional activities including standing/walking 15-20 mins I with LRAD without increased symptoms or restriction. [] Progressing: [] Met: [] Not Met: [] Adjusted     5.  Patient will perform functional sit to stand from chair I without restriction (patient specific functional goal)    [] Progressing: [] Met: [] Not Met: [] Adjusted      Electronically signed by:  Dominga Hernandez, PT

## 2021-08-03 NOTE — PROGRESS NOTES
FOLLOW-UP VISIT      The patient returns for follow-up s/p left total knee replacement    Date of Surgery    6/21/2021    Exam    She has no signs of infection or DVT. Range of motion 0 to 105 degrees. She is able to get up and down out of chairs and commodes without difficulty. She says she does not have to get up and down steps other than to get into her house which she also can do to her satisfaction. She does not desire any other intervention besides therapy.   .    Plan    begin outpatient physical therapy     Follow-up Appointment    4 weeks as needed

## 2021-08-05 ENCOUNTER — HOSPITAL ENCOUNTER (OUTPATIENT)
Dept: PHYSICAL THERAPY | Age: 61
Setting detail: THERAPIES SERIES
Discharge: HOME | End: 2021-08-05
Payer: COMMERCIAL

## 2021-08-05 PROCEDURE — 97110 THERAPEUTIC EXERCISES: CPT

## 2021-08-05 PROCEDURE — 97140 MANUAL THERAPY 1/> REGIONS: CPT

## 2021-08-05 NOTE — FLOWSHEET NOTE
[address] [place] [address] and Sports [facility], [address] [place] [address] [address]  Phone: [phone]   Fax:     [phone]      Physical Therapy Treatment Note/ Progress Report:     Date:  2021    Patient Name:  King Whitten  :  1960  MRN: [id number]  Restrictions/Precautions:    Medical/Treatment Diagnosis Information:  · Diagnosis: Status post total left knee replacement Z96.652; Left knee pain M25. 562  · Treatment Diagnosis: Left knee pain M25. 419  Insurance/Certification information:  PT Insurance Information: [facility]  Physician Information:  Referring Practitioner: Dr. Milly River  Has the plan of care been signed (Y/N):        []  Yes  [x]  No     Date of Patient follow up with Physician: 1 month    Is this a Progress Report:     []  Yes  [x]  No      If Yes:  Date Range for reporting period:  Beginnin/3/21 ------------ Endin/3/21    Progress report will be due (10 Rx or 30 days whichever is less): 64       Recertification will be due (POC Duration  / 90 days whichever is less): 11/3/21        Visit # Insurance Allowable Auth Required   In Person 2 30 []  Yes     []  No    Tele Health 0  []  Yes     []  No    Total 2       Functional Scale: LEFS 79%   Date assessed:  8/3/21      Latex Allergy:  [x]NO      []YES  Preferred Language for Healthcare:   [x]English       []other:    Pain level:  0-6/10     SUBJECTIVE:  Pt reports feels pretty good today.     OBJECTIVE: See eval   Observation:    Test measurements:  0-110 at conclusion    RESTRICTIONS/PRECAUTIONS: s/p L TKA 21  Hx R TKA     Exercises/Interventions:   Therapeutic Ex (53052) Sets/sec Reps Notes/CUES HEP   Bike ROM  Calf stretch belt 7 min  20-30s   3   vc    Heel prop  1:20 Vc    Quad sets 10s 10 vc    Heel slides supine/seated  10 vc    SLR 2 10 vc    TKE 2 10 Blue, vc    LAQ 2# 3x10 vc    HS curl  3x10 Green, vc    HR's  2x10 vc                  Pt education 8'  Reviewed HEP with gradual progression, goals of PT, not to push through pain with ex, use of ice- pt stated understanding    Manual Intervention (04745)       STM quad mm, patellar mobs, AP joint mobs 10 min                                         NMR re-education (43139)   CUES NEEDED                                                                   Therapeutic Activity (82348)                                          [email or web address] access code:   [email or web address]           Therapeutic Exercise and NMR EXR  [x] (15990) Provided verbal/tactile cueing for activities related to strengthening, flexibility, endurance, ROM for improvements in LE, proximal hip, and core control with self care, mobility, lifting, ambulation. [x] (22004) Provided verbal/tactile cueing for activities related to improving balance, coordination, kinesthetic sense, posture, motor skill, proprioception to assist with LE, proximal hip, and core control in self-care, mobility, lifting, ambulation and eccentric single leg control.      NMR and Therapeutic Activities:    [x] (19889 or 89553) Provided verbal/tactile cueing for activities related to improving balance, coordination, kinesthetic sense, posture, motor skill, proprioception and motor activation to allow for proper function of core, proximal hip and LE with self-care and ADLs and functional mobility.   [] (74129) Gait Re-education- Provided training and instruction to the patient for proper LE, core and proximal hip recruitment and positioning and eccentric body weight control with ambulation re-education including up and down stairs     Home Exercise Program:    [x] (65621) Reviewed/Progressed HEP activities related to strengthening, flexibility, endurance, ROM of core, proximal hip and LE for functional self-care, mobility, lifting and ambulation/stair navigation   [] (79976) Reviewed/Progressed HEP activities related to improving balance, coordination, kinesthetic sense, posture, motor skill, proprioception of core, proximal hip and LE for self-care, mobility, lifting, and ambulation/stair navigation      Manual Treatments:  PROM / STM / Oscillations-Mobs:  G-I, II, III, IV (PA's, Inf., Post.)  [x] (54152) Provided manual therapy to mobilize LE, proximal hip and/or LS spine soft tissue/joints for the purpose of modulating pain, promoting relaxation, increasing ROM, reducing/eliminating soft tissue swelling/inflammation/restriction, improving soft tissue extensibility and allowing for proper ROM for normal function with self-care, mobility, lifting and ambulation. Modalities:     [] GAME READY (VASO)- for significant edema, swelling, pain control. Charges:  Timed Code Treatment Minutes: 38   Total Treatment Minutes:  38   BWC:  TE TIME:  NMR TIME:  MANUAL TIME:  UNTIMED MINUTES:  Medicare Total:         [] EVAL (LOW) 94019 (typically 20 minutes face-to-face)  [] EVAL (MOD) 95215 (typically 30 minutes face-to-face)  [] EVAL (HIGH) 48855 (typically 45 minutes face-to-face)  [] RE-EVAL     [x] HA(23027) x   2  [] IONTO  [] NMR (73683) x     [] VASO  [x] Manual (98003) x     [] Other:  [] TA x      [] Mech Traction (29147)  [] ES(attended) (73563)      [] ES (un) (90622):    ASSESSMENT:   ROM increased. Increased reps and added some ex without complaints. Educated on continuing with HEP. GOALS:   Patient stated goal: Normal walking without pain. Therapist goals for Patient:   Short Term Goals: To be achieved in: 2 weeks  1. Independent in HEP and progression per patient tolerance, in order to prevent re-injury. [] Progressing: [] Met: [] Not Met: [] Adjusted     2. Patient will have a decrease in pain to facilitate improvement in movement, function, and ADLs as indicated by Functional Deficits. [] Progressing: [] Met: [] Not Met: [] Adjusted     Long Term Goals: To be achieved in: 12 weeks  1.  Disability index score of 37% or less for the LEFS to assist with reaching prior level of function. [] Progressing: [] Met: [] Not Met: [] Adjusted     2. Patient will demonstrate increased AROM to 0-110 to allow for proper joint functioning as indicated by patients Functional Deficits. [] Progressing: [] Met: [] Not Met: [] Adjusted     3. Patient will demonstrate an increase in Strength to good proximal hip strength and control, within 5lb HHD in LE to allow for proper functional mobility as indicated by patients Functional Deficits. [] Progressing: [] Met: [] Not Met: [] Adjusted     4. Patient will return to functional activities including standing/walking 15-20 mins I with LRAD without increased symptoms or restriction. [] Progressing: [] Met: [] Not Met: [] Adjusted     5. Patient will perform functional sit to stand from chair I without restriction (patient specific functional goal)    [] Progressing: [] Met: [] Not Met: [] Adjusted         Overall Progression Towards Functional goals/ Treatment Progress Update:  [] Patient is progressing as expected towards functional goals listed. [] Progression is slowed due to complexities/Impairments listed. [] Progression has been slowed due to co-morbidities.   [x] Plan just implemented, too soon to assess goals progression <30days   [] Goals require adjustment due to lack of progress  [] Patient is not progressing as expected and requires additional follow up with physician  [] Other    Prognosis for POC: [x] Good [] Fair  [] Poor      Patient requires continued skilled intervention: [x] Yes  [] No    Treatment/Activity Tolerance:  [x] Patient able to complete treatment  [] Patient limited by fatigue  [] Patient limited by pain    [] Patient limited by other medical complications  [] Other:     Return to Play: (if applicable)   []  Stage 1: Intro to Strength   []  Stage 2: Return to Run and Strength   []  Stage 3: Return to Jump and Strength   []  Stage 4: Dynamic Strength and Agility   []  Stage 5: Sport Specific Training     []  Ready to Return to Play, Meets All Above Stages   []  Not Ready for Return to Sports   Comments:                          PLAN: See eval  [x] Continue per plan of care [] Alter current plan (see comments above)  [] Plan of care initiated [] Hold pending MD visit [] Discharge    Electronically signed by:  Lorina Shone, PT    Note: If patient does not return for scheduled/ recommended follow up visits, this note will serve as a discharge from care along with most recent update on progress.

## 2021-08-10 ENCOUNTER — HOSPITAL ENCOUNTER (OUTPATIENT)
Dept: PHYSICAL THERAPY | Age: 61
Setting detail: THERAPIES SERIES
Discharge: HOME | End: 2021-08-10
Payer: COMMERCIAL

## 2021-08-12 ENCOUNTER — HOSPITAL ENCOUNTER (OUTPATIENT)
Dept: PHYSICAL THERAPY | Age: 61
Setting detail: THERAPIES SERIES
Discharge: HOME | End: 2021-08-12
Payer: COMMERCIAL

## 2021-08-12 NOTE — FLOWSHEET NOTE
[facility], [address] [place] [address], [address] [place] [address] [address]  Phone: [phone]   Fax:     [phone]    Physical Therapy  Cancellation/No-show Note  Patient Name:  Vy Stark  :  1960   Date:  2021    Cancelled visits to date: 2  No-shows to date: 0    For today's appointment patient:  [x]  Cancelled  []  Rescheduled appointment  []  No-show     Reason given by patient:  []  Patient ill  []  Conflicting appointment  []  No transportation    []  Conflict with work  []  No reason given  [x]  Other:     Comments: Forgot appt time, reviewed appt times for next week. Phone call information:   []  Phone call made today to patient at _ time at number provided:      []  Patient answered, conversation as follows:    []  Patient did not answer, message left as follows:  []  Phone call not made today  [x]  Phone call not needed - pt contacted us to cancel and provided reason for cancellation.      Electronically signed by:  Viviana Dupree, PT, PT

## 2021-08-17 ENCOUNTER — HOSPITAL ENCOUNTER (OUTPATIENT)
Dept: PHYSICAL THERAPY | Age: 61
Setting detail: THERAPIES SERIES
Discharge: HOME | End: 2021-08-17
Payer: COMMERCIAL

## 2021-08-17 PROCEDURE — 97110 THERAPEUTIC EXERCISES: CPT | Performed by: SPECIALIST/TECHNOLOGIST

## 2021-08-17 PROCEDURE — 97140 MANUAL THERAPY 1/> REGIONS: CPT | Performed by: SPECIALIST/TECHNOLOGIST

## 2021-08-17 NOTE — FLOWSHEET NOTE
[address] [place] [address] and Sports Rehabilitation[address], [address] [place] [address] [address]  Phone: [phone]   Fax:     [phone]      Physical Therapy Treatment Note/ Progress Report:     Date:  2021    Patient Name:  Caryn Walsh  :  1960  MRN: [id number]  Restrictions/Precautions:    Medical/Treatment Diagnosis Information:  · Diagnosis: Status post total left knee replacement Z96.652; Left knee pain M25. 562  · Treatment Diagnosis: Left knee pain M25. 990  Insurance/Certification information:  PT Insurance Information: [facility] Choice  Physician Information:  Referring Practitioner: Dr. Erin Pérez  Has the plan of care been signed (Y/N):        []  Yes  [x]  No     Date of Patient follow up with Physician: 1 month    Is this a Progress Report:     []  Yes  [x]  No      If Yes:  Date Range for reporting period:  Beginnin/3/21 ------------ Endin/3/21    Progress report will be due (10 Rx or 30 days whichever is less):        Recertification will be due (POC Duration  / 90 days whichever is less): 11/3/21        Visit # Insurance Allowable Auth Required   In Person 3 30 []  Yes     []  No    Tele Health 0  []  Yes     []  No    Total 3       Functional Scale: LEFS 79%   Date assessed:  8/3/21      Latex Allergy:  [x]NO      []YES  Preferred Language for Healthcare:   [x]English       []other:    Pain level:  0-6/10     SUBJECTIVE:  Pt reports she feels ok today. Notes she gets fatigued easily.       OBJECTIVE: See eval   Observation:    Test measurements:  0-110 at conclusion    RESTRICTIONS/PRECAUTIONS: s/p L TKA 21  Hx R TKA     Exercises/Interventions:   Therapeutic Ex (38977) Sets/sec Reps Notes/CUES HEP   Bike ROM  Calf stretch belt 7 min  20-30s   3   vc    Heel prop  1:20 Vc    Quad sets 10s 10 vc    Heel slides supine/seated  10 vc    SLR 2 10 vc    TKE 2 10 Blue, vc    LAQ 2# 3x10 vc    HS curl  3x10 Green, vc    HR's  2x10 vc                  Pt education 8'  Reviewed HEP with gradual progression, goals of PT, not to push through pain with ex, use of ice- pt stated understanding    Manual Intervention (55062)       STM quad mm, patellar mobs, AP joint mobs 10 min                                         NMR re-education (95039)   CUES NEEDED                                                                   Therapeutic Activity (15958)                                          [employer] access code:   [email or web address]           Therapeutic Exercise and NMR EXR  [x] (17055) Provided verbal/tactile cueing for activities related to strengthening, flexibility, endurance, ROM for improvements in LE, proximal hip, and core control with self care, mobility, lifting, ambulation. [x] (63578) Provided verbal/tactile cueing for activities related to improving balance, coordination, kinesthetic sense, posture, motor skill, proprioception to assist with LE, proximal hip, and core control in self-care, mobility, lifting, ambulation and eccentric single leg control.      NMR and Therapeutic Activities:    [x] (58701 or 09972) Provided verbal/tactile cueing for activities related to improving balance, coordination, kinesthetic sense, posture, motor skill, proprioception and motor activation to allow for proper function of core, proximal hip and LE with self-care and ADLs and functional mobility.   [] (11325) Gait Re-education- Provided training and instruction to the patient for proper LE, core and proximal hip recruitment and positioning and eccentric body weight control with ambulation re-education including up and down stairs     Home Exercise Program:    [x] (02581) Reviewed/Progressed HEP activities related to strengthening, flexibility, endurance, ROM of core, proximal hip and LE for functional self-care, mobility, lifting and ambulation/stair navigation   [] (08483) Reviewed/Progressed HEP activities related to improving balance, coordination, kinesthetic sense, posture, motor skill, proprioception of core, proximal hip and LE for self-care, mobility, lifting, and ambulation/stair navigation      Manual Treatments:  PROM / STM / Oscillations-Mobs:  G-I, II, III, IV (PA's, Inf., Post.)  [x] (87481) Provided manual therapy to mobilize LE, proximal hip and/or LS spine soft tissue/joints for the purpose of modulating pain, promoting relaxation, increasing ROM, reducing/eliminating soft tissue swelling/inflammation/restriction, improving soft tissue extensibility and allowing for proper ROM for normal function with self-care, mobility, lifting and ambulation. Modalities:     [] GAME READY (VASO)- for significant edema, swelling, pain control. Charges:  Timed Code Treatment Minutes: 40   Total Treatment Minutes:  40   BWC:  TE TIME:  NMR TIME:  MANUAL TIME:  UNTIMED MINUTES:  Medicare Total:         [] EVAL (LOW) 83872 (typically 20 minutes face-to-face)  [] EVAL (MOD) 13096 (typically 30 minutes face-to-face)  [] EVAL (HIGH) 01574 (typically 45 minutes face-to-face)  [] RE-EVAL     [x] ZN(69760) x   2  [] IONTO  [] NMR (54629) x     [] VASO  [x] Manual (38221) x   1  [] Other:  [] TA x      [] Mech Traction (86701)  [] ES(attended) (67314)      [] ES (un) (39354):    ASSESSMENT:   ROM increased. Increased reps and added some ex without complaints. Educated on continuing with HEP. GOALS:   Patient stated goal: Normal walking without pain. Therapist goals for Patient:   Short Term Goals: To be achieved in: 2 weeks  1. Independent in HEP and progression per patient tolerance, in order to prevent re-injury. [] Progressing: [] Met: [] Not Met: [] Adjusted     2. Patient will have a decrease in pain to facilitate improvement in movement, function, and ADLs as indicated by Functional Deficits. [] Progressing: [] Met: [] Not Met: [] Adjusted     Long Term Goals: To be achieved in: 12 weeks  1.  Disability index score of 37% or less for the LEFS to assist with reaching prior level of function. [] Progressing: [] Met: [] Not Met: [] Adjusted     2. Patient will demonstrate increased AROM to 0-110 to allow for proper joint functioning as indicated by patients Functional Deficits. [] Progressing: [] Met: [] Not Met: [] Adjusted     3. Patient will demonstrate an increase in Strength to good proximal hip strength and control, within 5lb HHD in LE to allow for proper functional mobility as indicated by patients Functional Deficits. [] Progressing: [] Met: [] Not Met: [] Adjusted     4. Patient will return to functional activities including standing/walking 15-20 mins I with LRAD without increased symptoms or restriction. [] Progressing: [] Met: [] Not Met: [] Adjusted     5. Patient will perform functional sit to stand from chair I without restriction (patient specific functional goal)    [] Progressing: [] Met: [] Not Met: [] Adjusted         Overall Progression Towards Functional goals/ Treatment Progress Update:  [] Patient is progressing as expected towards functional goals listed. [] Progression is slowed due to complexities/Impairments listed. [] Progression has been slowed due to co-morbidities.   [x] Plan just implemented, too soon to assess goals progression <30days   [] Goals require adjustment due to lack of progress  [] Patient is not progressing as expected and requires additional follow up with physician  [] Other    Prognosis for POC: [x] Good [] Fair  [] Poor      Patient requires continued skilled intervention: [x] Yes  [] No    Treatment/Activity Tolerance:  [x] Patient able to complete treatment  [] Patient limited by fatigue  [] Patient limited by pain    [] Patient limited by other medical complications  [] Other:     Return to Play: (if applicable)   []  Stage 1: Intro to Strength   []  Stage 2: Return to Run and Strength   []  Stage 3: Return to Jump and Strength   []  Stage 4: Dynamic Strength and Agility   []  Stage 5: Sport Specific Training     []  Ready to Return to Play, Meets All Above Stages   []  Not Ready for Return to Sports   Comments:                          PLAN: See eval  [x] Continue per plan of care [] Alter current plan (see comments above)  [] Plan of care initiated [] Hold pending MD visit [] Discharge    Electronically signed by:  Ruel Trejo PTA, ATC    Note: If patient does not return for scheduled/ recommended follow up visits, this note will serve as a discharge from care along with most recent update on progress.

## 2021-08-19 ENCOUNTER — HOSPITAL ENCOUNTER (OUTPATIENT)
Dept: PHYSICAL THERAPY | Age: 61
Setting detail: THERAPIES SERIES
Discharge: HOME | End: 2021-08-19
Payer: COMMERCIAL

## 2021-08-19 NOTE — FLOWSHEET NOTE
[address] and Sports Rehabilitation[address] [facility], [address] [place] [address] [address]  Phone: [phone]   Fax:     [phone]    Physical Therapy  Cancellation/No-show Note  Patient Name:  Dangelo Soriano  :  1960   Date:  2021    Cancelled visits to date: 3  No-shows to date: 0    For today's appointment patient:  [x]  Cancelled  []  Rescheduled appointment  []  No-show     Reason given by patient:  []  Patient ill  []  Conflicting appointment  []  No transportation    []  Conflict with work  [x]  No reason given  []  Other:     Comments:      Phone call information:   []  Phone call made today to patient at _ time at number provided:      []  Patient answered, conversation as follows:    []  Patient did not answer, message left as follows:  []  Phone call not made today  [x]  Phone call not needed - pt contacted us to cancel and provided reason for cancellation.      Electronically signed by:  Dewayne Diaz, PT, PT

## 2021-08-24 ENCOUNTER — HOSPITAL ENCOUNTER (OUTPATIENT)
Dept: PHYSICAL THERAPY | Age: 61
Setting detail: THERAPIES SERIES
Discharge: HOME | End: 2021-08-24
Payer: COMMERCIAL

## 2021-08-24 PROCEDURE — 97140 MANUAL THERAPY 1/> REGIONS: CPT

## 2021-08-24 PROCEDURE — 97110 THERAPEUTIC EXERCISES: CPT

## 2021-08-24 NOTE — FLOWSHEET NOTE
[address] [place] [address] [place] [address] [facility], [address] [place] [address] [address]  Phone: [phone]   Fax:     [phone]      Physical Therapy Treatment Note/ Progress Report:     Date:  2021    Patient Name:  King Whitten  :  1960  MRN: [id number]  Restrictions/Precautions:    Medical/Treatment Diagnosis Information:  · Diagnosis: Status post total left knee replacement Z96.652; Left knee pain M25. 562  · Treatment Diagnosis: Left knee pain M25. 206  Insurance/Certification information:  PT Insurance Information: [facility] Choice  Physician Information:  Referring Practitioner: Dr. Milly River  Has the plan of care been signed (Y/N):        [x]  Yes  []  No     Date of Patient follow up with Physician: 1 month    Is this a Progress Report:     []  Yes  [x]  No      If Yes:  Date Range for reporting period:  Beginnin/3/21 ------------ Endin/3/21    Progress report will be due (10 Rx or 30 days whichever is less): 50       Recertification will be due (POC Duration  / 90 days whichever is less): 11/3/21        Visit # Insurance Allowable Auth Required   In Person 4 30 []  Yes     []  No    Tele Health 0  []  Yes     []  No    Total 4       Functional Scale: LEFS 79%   Date assessed:  8/3/21      Latex Allergy:  [x]NO      []YES  Preferred Language for Healthcare:   [x]English       []other:    Pain level:  0-6/10     SUBJECTIVE:  Pt reports she feels ok today. Notes she gets fatigued easily.       OBJECTIVE: See eval   Observation:   Test measurements:  0-114 supine     RESTRICTIONS/PRECAUTIONS: s/p L TKA 21  Hx R TKA     Exercises/Interventions:   Therapeutic Ex (24192) Sets/sec Reps Notes/CUES HEP   Bike ROM  Calf stretch belt 8 min  20-30s   3   vc    Heel prop  1:20 Vc    Quad sets 10s 10 vc    Heel slides supine/seated  10 vc    SLR 2 10 vc    TKE 2 10 Blue, vc    LAQ 3# 3x10 vc    HS curl  3x10 Blue, vc    HR's  2x10 vc    1/2 tandem balance  SLS 5-10s  5-10s 3ea  3            Pt education 8'  Reviewed HEP with gradual progression, goals of PT, not to push through pain with ex, use of ice- pt stated understanding    Manual Intervention (46178)       STM quad mm, patellar mobs, AP joint mobs 8 min                                         NMR re-education (27895)   CUES NEEDED                                                                   Therapeutic Activity (05795)                                          [employer] access code:   [email or web address];  [email or web address]           Therapeutic Exercise and NMR EXR  [x] (05360) Provided verbal/tactile cueing for activities related to strengthening, flexibility, endurance, ROM for improvements in LE, proximal hip, and core control with self care, mobility, lifting, ambulation. [x] (04989) Provided verbal/tactile cueing for activities related to improving balance, coordination, kinesthetic sense, posture, motor skill, proprioception to assist with LE, proximal hip, and core control in self-care, mobility, lifting, ambulation and eccentric single leg control.      NMR and Therapeutic Activities:    [x] (86892 or 35207) Provided verbal/tactile cueing for activities related to improving balance, coordination, kinesthetic sense, posture, motor skill, proprioception and motor activation to allow for proper function of core, proximal hip and LE with self-care and ADLs and functional mobility.   [] (96529) Gait Re-education- Provided training and instruction to the patient for proper LE, core and proximal hip recruitment and positioning and eccentric body weight control with ambulation re-education including up and down stairs     Home Exercise Program:    [x] (23544) Reviewed/Progressed HEP activities related to strengthening, flexibility, endurance, ROM of core, proximal hip and LE for functional self-care, mobility, lifting and ambulation/stair navigation   [] (87424) Reviewed/Progressed HEP activities related to improving balance, coordination, kinesthetic sense, posture, motor skill, proprioception of core, proximal hip and LE for self-care, mobility, lifting, and ambulation/stair navigation      Manual Treatments:  PROM / STM / Oscillations-Mobs:  G-I, II, III, IV (PA's, Inf., Post.)  [x] (08704) Provided manual therapy to mobilize LE, proximal hip and/or LS spine soft tissue/joints for the purpose of modulating pain, promoting relaxation, increasing ROM, reducing/eliminating soft tissue swelling/inflammation/restriction, improving soft tissue extensibility and allowing for proper ROM for normal function with self-care, mobility, lifting and ambulation. Modalities:     [] GAME READY (VASO)- for significant edema, swelling, pain control. Charges:  Timed Code Treatment Minutes: 40   Total Treatment Minutes:  40   BWC:  TE TIME:  NMR TIME:  MANUAL TIME:  UNTIMED MINUTES:  Medicare Total:         [] EVAL (LOW) 74352 (typically 20 minutes face-to-face)  [] EVAL (MOD) 15076 (typically 30 minutes face-to-face)  [] EVAL (HIGH) 04612 (typically 45 minutes face-to-face)  [] RE-EVAL     [x] ZF(09092) x   2  [] IONTO  [] NMR (94738) x     [] VASO  [x] Manual (33782) x   1  [] Other:  [] TA x      [] Mech Traction (93545)  [] ES(attended) (74642)      [] ES (un) (22798):    ASSESSMENT:   ROM increased. Increased reps and added some ex without complaints. Educated on continuing with HEP. Added balance ex without issues. Pt limited by report of some back pain. Educated on performing throughout the day. GOALS:   Patient stated goal: Normal walking without pain. Therapist goals for Patient:   Short Term Goals: To be achieved in: 2 weeks  1. Independent in HEP and progression per patient tolerance, in order to prevent re-injury. [] Progressing: [] Met: [] Not Met: [] Adjusted     2. Patient will have a decrease in pain to facilitate improvement in movement, function, and ADLs as indicated by Functional Deficits.   [] Progressing: [] Met: [] Not Met: [] Adjusted     Long Term Goals: To be achieved in: 12 weeks  1. Disability index score of 37% or less for the LEFS to assist with reaching prior level of function. [] Progressing: [] Met: [] Not Met: [] Adjusted     2. Patient will demonstrate increased AROM to 0-110 to allow for proper joint functioning as indicated by patients Functional Deficits. [] Progressing: [] Met: [] Not Met: [] Adjusted     3. Patient will demonstrate an increase in Strength to good proximal hip strength and control, within 5lb HHD in LE to allow for proper functional mobility as indicated by patients Functional Deficits. [] Progressing: [] Met: [] Not Met: [] Adjusted     4. Patient will return to functional activities including standing/walking 15-20 mins I with LRAD without increased symptoms or restriction. [] Progressing: [] Met: [] Not Met: [] Adjusted     5. Patient will perform functional sit to stand from chair I without restriction (patient specific functional goal)    [] Progressing: [] Met: [] Not Met: [] Adjusted         Overall Progression Towards Functional goals/ Treatment Progress Update:  [] Patient is progressing as expected towards functional goals listed. [] Progression is slowed due to complexities/Impairments listed. [] Progression has been slowed due to co-morbidities.   [x] Plan just implemented, too soon to assess goals progression <30days   [] Goals require adjustment due to lack of progress  [] Patient is not progressing as expected and requires additional follow up with physician  [] Other    Prognosis for POC: [x] Good [] Fair  [] Poor      Patient requires continued skilled intervention: [x] Yes  [] No    Treatment/Activity Tolerance:  [x] Patient able to complete treatment  [] Patient limited by fatigue  [] Patient limited by pain    [] Patient limited by other medical complications  [] Other:     Return to Play: (if applicable)   []  Stage 1: Intro to

## 2021-08-26 ENCOUNTER — HOSPITAL ENCOUNTER (OUTPATIENT)
Dept: PHYSICAL THERAPY | Age: 61
Setting detail: THERAPIES SERIES
Discharge: HOME | End: 2021-08-26
Payer: COMMERCIAL

## 2021-08-26 NOTE — FLOWSHEET NOTE
[address] [facility], [address] [place] [address], [address] [place] [address] [address]  Phone: [phone]   Fax:     [phone]    Physical Therapy  Cancellation/No-show Note  Patient Name:  Dangelo Soriano  :  1960   Date:  2021    Cancelled visits to date: 4  No-shows to date: 0    For today's appointment patient:  [x]  Cancelled  []  Rescheduled appointment  []  No-show     Reason given by patient:  []  Patient ill  []  Conflicting appointment  []  No transportation    []  Conflict with work  []  No reason given  [x]  Other:     Comments:   Dry wallers coming to house today. Phone call information:   []  Phone call made today to patient at _ time at number provided:      []  Patient answered, conversation as follows:    []  Patient did not answer, message left as follows:  []  Phone call not made today  [x]  Phone call not needed - pt contacted us to cancel and provided reason for cancellation.      Electronically signed by:  Dewayne Diaz, PT, PT

## 2021-08-31 ENCOUNTER — HOSPITAL ENCOUNTER (OUTPATIENT)
Dept: PHYSICAL THERAPY | Age: 61
Setting detail: THERAPIES SERIES
Discharge: HOME | End: 2021-08-31
Payer: COMMERCIAL

## 2021-08-31 ENCOUNTER — OFFICE VISIT (OUTPATIENT)
Dept: ORTHOPEDIC SURGERY | Age: 61
End: 2021-08-31

## 2021-08-31 VITALS — WEIGHT: 293 LBS | HEIGHT: 68 IN | BODY MASS INDEX: 44.41 KG/M2

## 2021-08-31 DIAGNOSIS — Z96.652 STATUS POST TOTAL LEFT KNEE REPLACEMENT: Primary | ICD-10-CM

## 2021-08-31 PROCEDURE — 99024 POSTOP FOLLOW-UP VISIT: CPT | Performed by: ORTHOPAEDIC SURGERY

## 2021-08-31 PROCEDURE — 97140 MANUAL THERAPY 1/> REGIONS: CPT

## 2021-08-31 PROCEDURE — 97110 THERAPEUTIC EXERCISES: CPT

## 2021-08-31 PROCEDURE — 97112 NEUROMUSCULAR REEDUCATION: CPT

## 2021-08-31 NOTE — PROGRESS NOTES
3 [facility], [address] [place] [address] [address]  Phone: [phone]   Fax:     [phone]      Date: 2021          Patient Name; :  Jes Hill; 1960   Dx:   Status post total left knee replacement A17.072; Left knee pain M25. 562      Physician:   Dr. Elis Hensley        Total PT Visits: 5     Measures Previous Current   Pain (0-10) 0-6 0-4   Disability % 79% 45%   ROM Lacking 5 deg- 98 Lacking 2 fr 0- 115             Strength Quad poor Quad fair               Specific Functional Improvements & Impressions:  Pt progressing gradually with ROM, strength and function. Stated does feel improvement with gradual decrease in pain in knee. Stated is also limited by LBP with prolonged standing. Educated on continuing with HEP. Educated on performing ex throughout the day to improve tolerance. Plan & Recommendations:  [x] Continue rehabilitation due to objective improvement and continued functional deficits with frequency and duration:  [x] Progress toward  []GAP, []Work Conditioning, [x]Independent HEP   [] Discharge due to   [] All goals achieved, [] Maximized \"medical necessity\" [] No subjective or objective improvements      Electronically signed by:  Bernadine Gitelman, PT  Therapy Plan of Care Re-Certification  This patient has been re-evaluated for physical therapy services and for therapy to continue, Medicare, Medicaid and other insurances require periodic physician review of the treatment plan.  Please review the above re-evaluation and verify that you agree with plan of care as established above by signing the attached document and return it to our office or note changes to established plan below  [] Follow treatment plan as above [] Discontinue physical therapy  [] Change plan to:                                 __________________________________________________    Physician Signature:____________________________________ Date:____________  By signing above, therapists plan is approved by physician    If you have any questions or concerns, please don't hesitate to call. Thank you for your referral.    Date:  2021    Patient Name:  King Whitten  :  1960  MRN: [id number]  Restrictions/Precautions:    Medical/Treatment Diagnosis Information:  · Diagnosis: Status post total left knee replacement Z96.652; Left knee pain M25. 562  · Treatment Diagnosis: Left knee pain M25. 796  Insurance/Certification information:  PT Insurance Information: [facility] Choice  Physician Information:  Referring Practitioner: Dr. Milly River  Has the plan of care been signed (Y/N):        [x]  Yes  []  No     Date of Patient follow up with Physician: 1 month    Is this a Progress Report:     [x]  Yes  []  No      If Yes:  Date Range for reporting period:  Beginnin/3/21 ------------ Endin21    Progress report will be due (10 Rx or 30 days whichever is less): 69       Recertification will be due (POC Duration  / 90 days whichever is less): 11/3/21        Visit # Insurance Allowable Auth Required   In Person 5 30 []  Yes     []  No    Tele Health 0  []  Yes     []  No    Total 5       Functional Scale: LEFS 45%   Date assessed:   21      Latex Allergy:  [x]NO      []YES  Preferred Language for Healthcare:   [x]English       []other:    Pain level:  0-4/10     SUBJECTIVE:  Pt reports does feel gradual improvement with her knee. Stated limited as well by LBP.     OBJECTIVE: See eval   Observation:   Test measurements:  0-114 supine     RESTRICTIONS/PRECAUTIONS: s/p L TKA 21  Hx R TKA     Exercises/Interventions:   Therapeutic Ex (48382) Sets/sec Reps Notes/CUES HEP   Bike ROM  Calf stretch belt 8 min  20-30s   3   vc    Heel prop  1:20 Vc    Quad sets 10s 10 vc    Heel slides supine/seated  10 vc    SLR 3 10 vc    TKE 2 10 Blue, vc    LAQ 4# 3x10 vc    HS curl  3x10 Blue, vc    HR's  2x10 vc    1/2 tandem balance  SLS  Lateral walking 5-10s  5-10s 3ea  3  3 plinths            Pt education 10'  Reviewed HEP with gradual progression, goals of PT, not to push through pain with ex, use of ice- pt stated understanding    Manual Intervention (71776)       STM quad mm, patellar mobs, AP joint mobs 8 min                                         NMR re-education (60285)   CUES NEEDED                                                                   Therapeutic Activity (48143)                                          [employer] access code:   [email or web address];  [email or web address]           Therapeutic Exercise and NMR EXR  [x] (00623) Provided verbal/tactile cueing for activities related to strengthening, flexibility, endurance, ROM for improvements in LE, proximal hip, and core control with self care, mobility, lifting, ambulation. [x] (71416) Provided verbal/tactile cueing for activities related to improving balance, coordination, kinesthetic sense, posture, motor skill, proprioception to assist with LE, proximal hip, and core control in self-care, mobility, lifting, ambulation and eccentric single leg control.      NMR and Therapeutic Activities:    [x] (69987 or 37986) Provided verbal/tactile cueing for activities related to improving balance, coordination, kinesthetic sense, posture, motor skill, proprioception and motor activation to allow for proper function of core, proximal hip and LE with self-care and ADLs and functional mobility.   [] (66426) Gait Re-education- Provided training and instruction to the patient for proper LE, core and proximal hip recruitment and positioning and eccentric body weight control with ambulation re-education including up and down stairs     Home Exercise Program:    [x] (72663) Reviewed/Progressed HEP activities related to strengthening, flexibility, endurance, ROM of core, proximal hip and LE for functional self-care, mobility, lifting and ambulation/stair navigation   [] (92384) Reviewed/Progressed HEP activities related to improving balance, coordination, kinesthetic sense, posture, motor skill, proprioception of core, proximal hip and LE for self-care, mobility, lifting, and ambulation/stair navigation      Manual Treatments:  PROM / STM / Oscillations-Mobs:  G-I, II, III, IV (PA's, Inf., Post.)  [x] (06116) Provided manual therapy to mobilize LE, proximal hip and/or LS spine soft tissue/joints for the purpose of modulating pain, promoting relaxation, increasing ROM, reducing/eliminating soft tissue swelling/inflammation/restriction, improving soft tissue extensibility and allowing for proper ROM for normal function with self-care, mobility, lifting and ambulation. Modalities:     [] GAME READY (VASO)- for significant edema, swelling, pain control. Charges:  Timed Code Treatment Minutes: 45   Total Treatment Minutes:  45   BWC:  TE TIME:  NMR TIME:  MANUAL TIME:  UNTIMED MINUTES:  Medicare Total:         [] EVAL (LOW) 93571 (typically 20 minutes face-to-face)  [] EVAL (MOD) 68771 (typically 30 minutes face-to-face)  [] EVAL (HIGH) 91253 (typically 45 minutes face-to-face)  [] RE-EVAL     [x] EA(18271) x   1  [] IONTO  [x] NMR (84902) x   1  [] VASO  [x] Manual (82036) x   1  [] Other:  [] TA x      [] Mech Traction (59480)  [] ES(attended) (22160)      [] ES (un) (86151):    ASSESSMENT:   See above. GOALS:   Patient stated goal: Normal walking without pain. Therapist goals for Patient:   Short Term Goals: To be achieved in: 2 weeks  1. Independent in HEP and progression per patient tolerance, in order to prevent re-injury. [x] Progressing: [] Met: [] Not Met: [] Adjusted     2. Patient will have a decrease in pain to facilitate improvement in movement, function, and ADLs as indicated by Functional Deficits. [x] Progressing: [] Met: [] Not Met: [] Adjusted     Long Term Goals: To be achieved in: 12 weeks  1.  Disability index score of 37% or less for the LEFS to assist with reaching prior level to Return to Play, Meets All Above Stages   []  Not Ready for Return to Sports   Comments:                          PLAN: See eval  [x] Continue per plan of care [] Alter current plan (see comments above)  [] Plan of care initiated [] Hold pending MD visit [] Discharge    Electronically signed by:  Monty Marks, PT, DPT    Note: If patient does not return for scheduled/ recommended follow up visits, this note will serve as a discharge from care along with most recent update on progress.

## 2021-08-31 NOTE — PROGRESS NOTES
FOLLOW-UP VISIT      The patient returns for follow-up s/p left total knee replacement    Date of Surgery    6/21/2021    Exam    She has no signs of infection or DVT. Range of motion 0 to 115. She has no pain and happy with her result glad she had a done.   .    Plan    Continue therapy on as-needed basis    Follow-up Appointment    1 year as needed

## 2021-09-08 ENCOUNTER — HOSPITAL ENCOUNTER (OUTPATIENT)
Dept: PHYSICAL THERAPY | Age: 61
Setting detail: THERAPIES SERIES
Discharge: HOME | End: 2021-09-08
Payer: COMMERCIAL

## 2021-09-28 RX ORDER — NIFEDIPINE 60 MG/1
60 TABLET, EXTENDED RELEASE ORAL NIGHTLY
Qty: 90 TABLET | Refills: 1 | Status: SHIPPED | OUTPATIENT
Start: 2021-09-28 | End: 2022-07-14

## 2021-09-28 RX ORDER — LOSARTAN POTASSIUM 50 MG/1
50 TABLET ORAL NIGHTLY
Qty: 90 TABLET | Refills: 1 | Status: SHIPPED | OUTPATIENT
Start: 2021-09-28 | End: 2022-07-14

## 2021-11-11 ENCOUNTER — OFFICE VISIT (OUTPATIENT)
Dept: FAMILY MEDICINE CLINIC | Age: 61
End: 2021-11-11
Payer: COMMERCIAL

## 2021-11-11 VITALS
BODY MASS INDEX: 55.5 KG/M2 | SYSTOLIC BLOOD PRESSURE: 118 MMHG | DIASTOLIC BLOOD PRESSURE: 78 MMHG | HEIGHT: 68 IN | OXYGEN SATURATION: 97 % | HEART RATE: 94 BPM

## 2021-11-11 DIAGNOSIS — L03.116 CELLULITIS OF LEFT LOWER EXTREMITY: Primary | ICD-10-CM

## 2021-11-11 PROBLEM — L03.90 CELLULITIS: Status: ACTIVE | Noted: 2021-11-11

## 2021-11-11 PROCEDURE — 99213 OFFICE O/P EST LOW 20 MIN: CPT | Performed by: INTERNAL MEDICINE

## 2021-11-11 RX ORDER — CEPHALEXIN 500 MG/1
500 CAPSULE ORAL 3 TIMES DAILY
Qty: 21 CAPSULE | Refills: 0 | Status: SHIPPED | OUTPATIENT
Start: 2021-11-11 | End: 2021-11-18

## 2021-11-11 RX ORDER — ATORVASTATIN CALCIUM 20 MG/1
TABLET, FILM COATED ORAL
Qty: 90 TABLET | Refills: 1 | Status: SHIPPED | OUTPATIENT
Start: 2021-11-11 | End: 2022-06-13 | Stop reason: SDUPTHER

## 2021-11-11 ASSESSMENT — ENCOUNTER SYMPTOMS
COLOR CHANGE: 1
RESPIRATORY NEGATIVE: 1

## 2021-11-11 NOTE — PROGRESS NOTES
Dewayne Anderson presents today for   Chief Complaint   Patient presents with    Leg Pain     Patient is here with complaints of pain and redness in her left leg. she says this has been ongoing for a few weeks and is not getting any better. Cellulitis   L lower leg. Redness and swelling L pre-tibial area. Scratch from dog several weeks ago. Review of Systems   Constitutional: Negative. Respiratory: Negative. Cardiovascular: Negative. Genitourinary: Negative. Musculoskeletal: Positive for arthralgias and myalgias. Skin: Positive for color change. Neurological: Negative. Hematological: Negative. Vitals:    11/11/21 1532   BP: 118/78   Pulse: 94   SpO2: 97%   Height: 5' 8\" (1.727 m)        Physical Exam  Constitutional:       General: She is not in acute distress. Appearance: She is well-developed. She is not ill-appearing, toxic-appearing or diaphoretic. HENT:      Head: Normocephalic and atraumatic. Eyes:      Conjunctiva/sclera: Conjunctivae normal.      Pupils: Pupils are equal, round, and reactive to light. Neck:      Thyroid: No thyroid mass or thyromegaly. Vascular: Normal carotid pulses. No carotid bruit, hepatojugular reflux or JVD. Trachea: Trachea normal. No tracheal deviation. Cardiovascular:      Rate and Rhythm: Normal rate and regular rhythm. No extrasystoles are present. Chest Wall: PMI is not displaced. Pulses: Normal pulses. No decreased pulses. Carotid pulses are 2+ on the right side and 2+ on the left side. Radial pulses are 2+ on the right side and 2+ on the left side. Femoral pulses are 2+ on the right side and 2+ on the left side. Popliteal pulses are 2+ on the right side and 2+ on the left side. Dorsalis pedis pulses are 2+ on the right side and 2+ on the left side. Posterior tibial pulses are 2+ on the right side and 2+ on the left side.       Heart sounds: Normal heart sounds, S1 normal and S2 normal. Heart sounds not distant. No murmur heard. No friction rub. No gallop. No S3 or S4 sounds. Pulmonary:      Effort: Pulmonary effort is normal. No tachypnea, bradypnea, accessory muscle usage or respiratory distress. Breath sounds: Normal breath sounds. No decreased breath sounds, wheezing, rhonchi or rales. Chest:      Chest wall: No tenderness. Musculoskeletal:         General: No tenderness. Normal range of motion. Cervical back: Full passive range of motion without pain, normal range of motion and neck supple. No edema or erythema. No spinous process tenderness or muscular tenderness. Normal range of motion. Right lower leg: No edema. Left lower leg: No edema. Lymphadenopathy:      Cervical: No cervical adenopathy. Skin:     General: Skin is warm and dry. Capillary Refill: Capillary refill takes less than 2 seconds. Coloration: Skin is not jaundiced or pale. Findings: Erythema ( L erythema Pre-tibial area. ) present. No abrasion, bruising, lesion or rash. Nails: There is no clubbing. Neurological:      General: No focal deficit present. Mental Status: She is alert and oriented to person, place, and time. Mental status is at baseline. She is not disoriented. Cranial Nerves: No cranial nerve deficit. Sensory: No sensory deficit. Motor: No weakness, tremor, atrophy or abnormal muscle tone. Coordination: Coordination normal.      Gait: Gait normal.      Deep Tendon Reflexes: Reflexes are normal and symmetric. Psychiatric:         Mood and Affect: Mood normal.         Speech: Speech normal.         Behavior: Behavior normal.         Thought Content: Thought content normal.         Judgment: Judgment normal.          A/P:     Cellulitis  _ Keflex 500 mg Three times daily for 1 week.         Ashleigh Armando MD

## 2021-11-14 RX ORDER — FUROSEMIDE 20 MG/1
TABLET ORAL
Qty: 60 TABLET | Refills: 5 | Status: SHIPPED | OUTPATIENT
Start: 2021-11-14

## 2021-12-03 ENCOUNTER — TELEPHONE (OUTPATIENT)
Dept: FAMILY MEDICINE CLINIC | Age: 61
End: 2021-12-03

## 2021-12-03 RX ORDER — CEPHALEXIN 500 MG/1
500 CAPSULE ORAL 3 TIMES DAILY
Qty: 21 CAPSULE | Refills: 0 | Status: SHIPPED | OUTPATIENT
Start: 2021-12-03 | End: 2021-12-10

## 2021-12-03 NOTE — TELEPHONE ENCOUNTER
Pt calling in stating that her cellulitis is back, she  completed the Keflex and it helped but now it is back and it is both legs. She completed the Keflex on 11/18/2021, her legs are not hot like they were the last time just luke warm and she is not running a fever. She would like to know if you can call something else into [facility] on [address] in [place] and can follow up with [facility] if needed.

## 2022-02-22 NOTE — TELEPHONE ENCOUNTER
Mateus Stein is requesting refill(s)   Last OV 11/11/2021 (pertaining to medication)  LR 04/05/2021 (per medication requested)  Next office visit scheduled or attempted Has a NP appt with [facility] on 05/12/2022

## 2022-02-23 RX ORDER — OMEPRAZOLE 20 MG/1
CAPSULE, DELAYED RELEASE ORAL
Qty: 90 CAPSULE | Refills: 2 | Status: SHIPPED | OUTPATIENT
Start: 2022-02-23 | End: 2022-06-13 | Stop reason: SDUPTHER

## 2022-05-12 ENCOUNTER — OFFICE VISIT (OUTPATIENT)
Dept: FAMILY MEDICINE CLINIC | Age: 62
End: 2022-05-12
Payer: COMMERCIAL

## 2022-05-12 VITALS
RESPIRATION RATE: 20 BRPM | HEART RATE: 94 BPM | DIASTOLIC BLOOD PRESSURE: 78 MMHG | HEIGHT: 68 IN | OXYGEN SATURATION: 96 % | SYSTOLIC BLOOD PRESSURE: 126 MMHG | BODY MASS INDEX: 44.41 KG/M2 | WEIGHT: 293 LBS | TEMPERATURE: 97.5 F

## 2022-05-12 DIAGNOSIS — G47.33 OSA (OBSTRUCTIVE SLEEP APNEA): ICD-10-CM

## 2022-05-12 DIAGNOSIS — Z98.84 S/P LAPAROSCOPIC SLEEVE GASTRECTOMY: ICD-10-CM

## 2022-05-12 DIAGNOSIS — Z23 NEED FOR PNEUMOCOCCAL VACCINATION: ICD-10-CM

## 2022-05-12 DIAGNOSIS — Z96.652 STATUS POST TOTAL LEFT KNEE REPLACEMENT: ICD-10-CM

## 2022-05-12 DIAGNOSIS — Z00.00 ANNUAL PHYSICAL EXAM: Primary | ICD-10-CM

## 2022-05-12 DIAGNOSIS — K92.1 BLOOD IN STOOL: ICD-10-CM

## 2022-05-12 DIAGNOSIS — E66.01 CLASS 3 SEVERE OBESITY DUE TO EXCESS CALORIES WITH SERIOUS COMORBIDITY AND BODY MASS INDEX (BMI) OF 50.0 TO 59.9 IN ADULT (HCC): ICD-10-CM

## 2022-05-12 DIAGNOSIS — F33.1 MODERATE EPISODE OF RECURRENT MAJOR DEPRESSIVE DISORDER (HCC): ICD-10-CM

## 2022-05-12 DIAGNOSIS — E78.00 HYPERCHOLESTEREMIA: ICD-10-CM

## 2022-05-12 DIAGNOSIS — Z23 NEED FOR SHINGLES VACCINE: ICD-10-CM

## 2022-05-12 DIAGNOSIS — Z12.31 SCREENING MAMMOGRAM, ENCOUNTER FOR: ICD-10-CM

## 2022-05-12 PROCEDURE — 36415 COLL VENOUS BLD VENIPUNCTURE: CPT | Performed by: PHYSICIAN ASSISTANT

## 2022-05-12 PROCEDURE — 90677 PCV20 VACCINE IM: CPT | Performed by: PHYSICIAN ASSISTANT

## 2022-05-12 PROCEDURE — 90750 HZV VACC RECOMBINANT IM: CPT | Performed by: PHYSICIAN ASSISTANT

## 2022-05-12 PROCEDURE — 99214 OFFICE O/P EST MOD 30 MIN: CPT | Performed by: PHYSICIAN ASSISTANT

## 2022-05-12 PROCEDURE — 90471 IMMUNIZATION ADMIN: CPT | Performed by: PHYSICIAN ASSISTANT

## 2022-05-12 PROCEDURE — 99396 PREV VISIT EST AGE 40-64: CPT | Performed by: PHYSICIAN ASSISTANT

## 2022-05-12 RX ORDER — TIZANIDINE 4 MG/1
4 TABLET ORAL EVERY 6 HOURS PRN
Qty: 20 TABLET | Refills: 0 | Status: SHIPPED | OUTPATIENT
Start: 2022-05-12 | End: 2022-06-15 | Stop reason: SDUPTHER

## 2022-05-12 ASSESSMENT — PATIENT HEALTH QUESTIONNAIRE - PHQ9
1. LITTLE INTEREST OR PLEASURE IN DOING THINGS: 3
3. TROUBLE FALLING OR STAYING ASLEEP: 3
5. POOR APPETITE OR OVEREATING: 3
SUM OF ALL RESPONSES TO PHQ QUESTIONS 1-9: 21
4. FEELING TIRED OR HAVING LITTLE ENERGY: 3
SUM OF ALL RESPONSES TO PHQ QUESTIONS 1-9: 21
6. FEELING BAD ABOUT YOURSELF - OR THAT YOU ARE A FAILURE OR HAVE LET YOURSELF OR YOUR FAMILY DOWN: 3
SUM OF ALL RESPONSES TO PHQ9 QUESTIONS 1 & 2: 6
10. IF YOU CHECKED OFF ANY PROBLEMS, HOW DIFFICULT HAVE THESE PROBLEMS MADE IT FOR YOU TO DO YOUR WORK, TAKE CARE OF THINGS AT HOME, OR GET ALONG WITH OTHER PEOPLE: 0
9. THOUGHTS THAT YOU WOULD BE BETTER OFF DEAD, OR OF HURTING YOURSELF: 0
2. FEELING DOWN, DEPRESSED OR HOPELESS: 3
8. MOVING OR SPEAKING SO SLOWLY THAT OTHER PEOPLE COULD HAVE NOTICED. OR THE OPPOSITE, BEING SO FIGETY OR RESTLESS THAT YOU HAVE BEEN MOVING AROUND A LOT MORE THAN USUAL: 3
SUM OF ALL RESPONSES TO PHQ QUESTIONS 1-9: 21
SUM OF ALL RESPONSES TO PHQ QUESTIONS 1-9: 21
7. TROUBLE CONCENTRATING ON THINGS, SUCH AS READING THE NEWSPAPER OR WATCHING TELEVISION: 0

## 2022-05-12 ASSESSMENT — COLUMBIA-SUICIDE SEVERITY RATING SCALE - C-SSRS
6. HAVE YOU EVER DONE ANYTHING, STARTED TO DO ANYTHING, OR PREPARED TO DO ANYTHING TO END YOUR LIFE?: NO
1. WITHIN THE PAST MONTH, HAVE YOU WISHED YOU WERE DEAD OR WISHED YOU COULD GO TO SLEEP AND NOT WAKE UP?: NO
2. HAVE YOU ACTUALLY HAD ANY THOUGHTS OF KILLING YOURSELF?: NO

## 2022-05-12 NOTE — TELEPHONE ENCOUNTER
Patient called and said she forgot to mention that she needs a refill on her medication she takes for cellulitis. Could not remember the name of the medication.

## 2022-05-12 NOTE — PROGRESS NOTES
[address] EXAMINATION       5/12/2022       CC:    Chief Complaint   Patient presents with    Established New Doctor     Pt is NTP and FBW. Pt states her eyes are hurting and itching. Pt has cellulitis in legs     Rash     on scalp       HPI: Dileep Yañez 1960 is a 59 y.o. NEW to provider  who presents for yearlycomplete preventive health medical examination. Obesity, morbid, BMI 55, s/p Gastric bypass 7 yr ago, then divorce and fathers illness spiraled her back to eating and gained all wt back. Does not want another surgery. Moderate episode of recurrent major depressive disorder ([address]  Still not well controlled w/ Citalopram, Trazodone, and Clonazepam. Sees Psychiatrist regularly. Poor self image since weight returned.     Obstructive sleep apnea syndrome  Didn't use C-pap and got rid of it. More annoying than helpful.     Essential hypertension  No change in meds, no c/o with meds, no chest pain, SOB, palpatations, or syncope. Home bp not taken.      Pure hypercholesterolemia  Poor diet and wt is up . No c/o w/ meds.        Left hip pain and lower back pain  Attributes this to her weight. Severe pain on/off. Muscle relaxant prn help. IBS- with blood and mostly diarrhea. Rash on scalp and around ears. Head and shoulders irritated scalp. [id number] [facility],[address] psoriasis.     Annual physical exam  Mammo: 3/27/2018  Pap: 4/2019  Colonoscopy: 5/17/2018. rechx 5 yrs, due 2023  DEXA: age 70  Eye: due  Hearing: passed in office exam  Immunnization: Shingrix vaccine soon, Flu shot in [place].  MMSE: na  Get Up and Go: na  Tob: nonsmoker/never  ETOH: none  Caffeine: moderate am  Living will:  no       REVIEW OF SYSTEMS:    Pertinent positive and negatives are in HPI.  The remaining 14 ROS were reviewed and are unremarkable for other constitutional, EENT, cardiac, pulmonary, GI, , neurologic, musculoskeletal, or integumentary complaints    PAST MEDICAL HISTORY:      Diagnosis Date  Anxiety     with panic attacks    Arthritis     knees    Chronic back pain     Dental crowns present     upper left dental implant    Depression     Hyperlipidemia     borderline    Hypertension     Hypotension     Her Father after anesthesia    IBS (irritable bowel syndrome)     with rectal bleeding    Knee osteoarthritis 1/25/2013    Morbid obesity ([address]     Positive H. pylori test 12/10/12    Prolonged emergence from general anesthesia      Past Surgical History:   Procedure Laterality Date    CHOLECYSTECTOMY  2008    COLONOSCOPY      due age 46    COLONOSCOPY  12/10/2012    FOOT FRACTURE SURGERY      FOOT SURGERY  11/2011    Right peroneal tendon Kimberly Angry); ACP peroneal tendons with US guided injection    JOINT REPLACEMENT Right 2013    knee    KNEE ARTHROSCOPY Left 10/13/2017    Left knee DVA with partial medial menisectomy and loose body removal; internal fixation of insuff. Fx's of the MFT/MTP with bone substitute    SLEEVE GASTRECTOMY  12/08/2017    LAPAROSCOPIC SLEEVE GASTRECTOMY WITH HIATAL HERNIA REPAIR    TONSILLECTOMY  1963    TOTAL KNEE ARTHROPLASTY Left 06/21/2021    LEFT TOTAL KNEE REPLACEMENT performed by Radha Rose MD at [facility]  12/10/2012    gastritis     Social and Family reviewed. ALLERGIES:    Patient has no known allergies. MEDICATIONS:  Current Outpatient Medications   Medication Sig Dispense Refill    omeprazole (PRILOSEC) 20 MG delayed release capsule TAKE ONE CAPSULE BY MOUTH EVERY MORNING BEFORE BREAKFAST 90 capsule 2    atorvastatin (LIPITOR) 20 MG tablet TAKE ONE TABLET BY MOUTH DAILY 90 tablet 1    NIFEdipine (PROCARDIA XL) 60 MG extended release tablet Take 1 tablet by mouth nightly 90 tablet 1    losartan (COZAAR) 50 MG tablet Take 1 tablet by mouth nightly 90 tablet 1    clonazePAM (KLONOPIN) 2 MG tablet Take 1 tablet by mouth nightly for 3 days.  3 tablet 0    citalopram (CELEXA) 40 MG tablet Take 40 mg by mouth nightly       traZODone (DESYREL) 50 MG tablet Take 100 mg by mouth nightly       furosemide (LASIX) 20 MG tablet TAKE ONE TABLET BY MOUTH DAILY (Patient not taking: Reported on 5/12/2022) 60 tablet 5    potassium chloride (KLOR-CON M) 20 MEQ extended release tablet Take 1 tablet by mouth daily (Patient not taking: Reported on 5/12/2022) 60 tablet 1    tiZANidine (ZANAFLEX) 4 MG tablet Take 1 tablet by mouth every 6 hours as needed (muscle spasm) (Patient not taking: Reported on 11/11/2021) 30 tablet 0     No current facility-administered medications for this visit. PHYSICAL EXAM:   Vitals:    05/12/22 1336   BP: 126/78   Site: Left Upper Arm   Position: Sitting   Pulse: 94   Resp: 20   Temp: 97.5 °F (36.4 °C)   SpO2: 96%   Weight: (!) 361 lb 3.2 oz (163.8 kg)   Height: 5' 8\" (1.727 m)       Body mass index is 54.92 kg/m². GENERAL APPEARANCE:  Well nourished. No distress. HEENT: Normocephalic. Atraumatic. EYES: Vision intact. EOMI, PERRLA. Conjunctivae pink, moist. Sclera white. Cornea and lens without opacities. EARS: Auricles symmetrical without lesions or deformities. Canals are clear. TM's intact bilaterally. Hearing  intact. NOSE: patent. MOUTH:  Moist, teeth intact. Throat clear. NECK: No lymphadenopathy. Thyroid smooth, not enlarged. LUNGS: Clear to auscultation,. No wheezes, rales, or rhonci. Equal resonance to chest percussion. CHEST/SPINE: No skin changes or chest wall deformities. No CVAT. No spine or paraspinal muscle tenderness or deformities. Full range of motion in all planes. HEART:  Regular rate and rhythm. No murmurs, rubs, or gallops. VASCULAR:  No jugular venous distension or carotid bruits. Pulses equal and symmetrical upper and lower extremities. Capillary refill <3secs. ABDOMEN: Truncal obesity. Without scars. Soft, non-tender, normoactive bowel sounds. No pulsatile masses or hepatosplenomegaly.     EXTREMITIES: No skin or bony deformities. Symmetrical strength. Full range of motion without eliciting pain. Sensation and DTR's are equal and intact. Lumbar spine tenderness. Limited ROM due to body habitus. NEUROLOGIC: Grossly non focal. Cranial Nerves II-XII intact. Negative Rhomberg. SKIN: Warm, dry and intact. No rashes, petechia, purpura. No suspicious lesions. No nail clubbing or cyanosis or edema. Dry scaly erythemic patches surrounding ears and nape of neck. Seb keratosis face and chest.  PSYCHIATRIC:  Answers and understands questions appropriately. Normal affect, behavior, and mood. ADDITIONAL DATA:  Prior clinic visit notes, labs, and imaging reports were reviewed. Orders Placed This Encounter   Procedures    JOSE MARTIN DIGITAL SCREEN W OR WO CAD BILATERAL    Zoster Subunit (SHINGRIX)    Pneumococcal Conjugate PCV20, PF (Prevnar 20)    Lipid Panel    Comprehensive Metabolic Panel    CBC with Auto Differential    Ambulatory Referral to Bariatric Surgery        ASSESSMENT & PLAN:       Annual physical exam  -have mammogram( will get on [address])  - Shingrix #1 and [id number] vax today     -   colonoscopy due 2025  - Medical records updated. - Recommendations for healthy living discussed: Moderate episode of recurrent major depressive disorder ([facility] [address]  Still not well controlled w/ Citalopram, Trazodone, and Clonazepam. Sees Psychiatrist regularly. Poor self image since weight returned. Class 3 severe obesity due to excess calories with serious comorbidity and body mass index (BMI) of 50.0 to 59.9 in adult [facility])  S/P laparoscopic sleeve gastrectomy  -     Ambulatory Referral to NON Bariatric Surgery, Dr Sasha Russell. ESTHER (obstructive sleep apnea)  -     Refuses to use CPAP. Causes sleep pattern to be worse. Hypercholesteremia  -     Lipid Panel  -     Comprehensive Metabolic Panel    Chronic low back pain.  -zanaflex prn. Stay mobile.     IBS with occasional  Blood in stool  -     CBC with Auto Differential  - begin daily drink of metamucil      Follow Up every 6 mo    Electronically signed by MATILDE Green on 5/12/2022 at 2:29 PM

## 2022-05-12 NOTE — PATIENT INSTRUCTIONS
Healthy Living Recommendations:  -Exercise 150 minutes/ week to include aerobic and weights. Body Mass Index (BMI) should be 25 or less. -Diet: EAT: a salad and at least one other vegetable a day. Must contain a green leafy veg. Berries, beans, legumes. Whole grains. Fish ( not fried), Poultry. Use olive oil. Avoid foods made with raw sugar such as candy, cookies, and drinks with sugar. Avoid greasy, fried, and processed foods. Studies show that obesity,  diets high in red meat and processed foods, tobacco use, alcohol abuse, and a sedentary lifestyle WILL increase the risk of developing heart and liver disease, increase cancers, and dementias.  -Eye exam every 2 years over age 36.   -Dental exam every 6 months.  -Colonoscopy at age 39  Females: perform monthly skin exam,  self breast exam and yearly mammograms  Males: perform monthly skin exam and testicular exam.      NEW to PROVIDER:     Dheeraj Yates, [address], [address] [place] [address]  Office hours are: Monday - Friday 7 am- 5 pm. Phone lines turn on at 8 am.   Office [address]: [phone]     -Please call your pharmacy \ for medication refills.  -Please be sure to call our office if your illness/problem that has been treated has not completely resolved. -Bring an accurate list of your medications with you at every appointment to ensure that we have the correct information.  -Arrive 15 minutes prior to appointments.

## 2022-05-12 NOTE — TELEPHONE ENCOUNTER
During exam, the left leg was previewed and had 2 adhesions without surrounding erythema. Keep leg washed daily. If redness consumes the lower leg, make another appointment to preview.

## 2022-05-13 LAB
A/G RATIO: 1.5 (ref 1.1–2.2)
ALBUMIN SERPL-MCNC: 4.5 G/DL (ref 3.4–5)
ALP BLD-CCNC: 133 U/L (ref 40–129)
ALT SERPL-CCNC: 12 U/L (ref 10–40)
ANION GAP SERPL CALCULATED.3IONS-SCNC: 18 MMOL/L (ref 3–16)
AST SERPL-CCNC: 18 U/L (ref 15–37)
BASOPHILS ABSOLUTE: 0.1 K/UL (ref 0–0.2)
BASOPHILS RELATIVE PERCENT: 0.8 %
BILIRUB SERPL-MCNC: 0.4 MG/DL (ref 0–1)
BUN BLDV-MCNC: 11 MG/DL (ref 7–20)
CALCIUM SERPL-MCNC: 9.5 MG/DL (ref 8.3–10.6)
CHLORIDE BLD-SCNC: 103 MMOL/L (ref 99–110)
CHOLESTEROL, TOTAL: 170 MG/DL (ref 0–199)
CO2: 20 MMOL/L (ref 21–32)
CREAT SERPL-MCNC: 0.7 MG/DL (ref 0.6–1.2)
EOSINOPHILS ABSOLUTE: 0 K/UL (ref 0–0.6)
EOSINOPHILS RELATIVE PERCENT: 0.3 %
GFR AFRICAN AMERICAN: >60
GFR NON-AFRICAN AMERICAN: >60
GLUCOSE BLD-MCNC: 113 MG/DL (ref 70–99)
HCT VFR BLD CALC: 44.6 % (ref 36–48)
HDLC SERPL-MCNC: 52 MG/DL (ref 40–60)
HEMOGLOBIN: 14.6 G/DL (ref 12–16)
LDL CHOLESTEROL CALCULATED: 86 MG/DL
LYMPHOCYTES ABSOLUTE: 1.5 K/UL (ref 1–5.1)
LYMPHOCYTES RELATIVE PERCENT: 17.4 %
MCH RBC QN AUTO: 25.5 PG (ref 26–34)
MCHC RBC AUTO-ENTMCNC: 32.8 G/DL (ref 31–36)
MCV RBC AUTO: 77.8 FL (ref 80–100)
MONOCYTES ABSOLUTE: 0.7 K/UL (ref 0–1.3)
MONOCYTES RELATIVE PERCENT: 7.7 %
NEUTROPHILS ABSOLUTE: 6.3 K/UL (ref 1.7–7.7)
NEUTROPHILS RELATIVE PERCENT: 73.8 %
PDW BLD-RTO: 16.2 % (ref 12.4–15.4)
PLATELET # BLD: ABNORMAL K/UL (ref 135–450)
PLATELET SLIDE REVIEW: ABNORMAL
PMV BLD AUTO: ABNORMAL FL (ref 5–10.5)
POTASSIUM SERPL-SCNC: 4.3 MMOL/L (ref 3.5–5.1)
RBC # BLD: 5.74 M/UL (ref 4–5.2)
SLIDE REVIEW: ABNORMAL
SODIUM BLD-SCNC: 141 MMOL/L (ref 136–145)
TOTAL PROTEIN: 7.5 G/DL (ref 6.4–8.2)
TRIGL SERPL-MCNC: 158 MG/DL (ref 0–150)
VLDLC SERPL CALC-MCNC: 32 MG/DL
WBC # BLD: 8.5 K/UL (ref 4–11)

## 2022-06-13 DIAGNOSIS — Z96.652 STATUS POST TOTAL LEFT KNEE REPLACEMENT: ICD-10-CM

## 2022-06-13 NOTE — TELEPHONE ENCOUNTER
Refill Request     Last Seen: Last Seen Department: 5/12/2022  Last Seen by PCP: 5/12/2022    Last Written: 02/23/2022,05/12/2022,11/11/2021    Next Appointment:   Future Appointments   Date Time Provider Yesenia Saab   11/17/2022  2:00 PM MATILDE Rachel - DYD       Future appointment scheduled      Requested Prescriptions     Pending Prescriptions Disp Refills    atorvastatin (LIPITOR) 20 MG tablet 90 tablet 1     Sig: TAKE ONE TABLET BY MOUTH DAILY    omeprazole (PRILOSEC) 20 MG delayed release capsule 90 capsule 2     Sig: TAKE ONE CAPSULE BY MOUTH EVERY MORNING BEFORE BREAKFAST

## 2022-06-15 RX ORDER — OMEPRAZOLE 20 MG/1
CAPSULE, DELAYED RELEASE ORAL
Qty: 90 CAPSULE | Refills: 2 | Status: SHIPPED | OUTPATIENT
Start: 2022-06-15

## 2022-06-15 RX ORDER — TIZANIDINE 4 MG/1
4 TABLET ORAL EVERY 6 HOURS PRN
Qty: 20 TABLET | Refills: 0 | Status: SHIPPED | OUTPATIENT
Start: 2022-06-15

## 2022-06-15 RX ORDER — ATORVASTATIN CALCIUM 20 MG/1
TABLET, FILM COATED ORAL
Qty: 90 TABLET | Refills: 1 | Status: SHIPPED | OUTPATIENT
Start: 2022-06-15

## 2022-07-14 RX ORDER — LOSARTAN POTASSIUM 50 MG/1
TABLET ORAL
Qty: 90 TABLET | Refills: 1 | Status: SHIPPED | OUTPATIENT
Start: 2022-07-14

## 2022-07-14 RX ORDER — NIFEDIPINE 60 MG/1
TABLET, EXTENDED RELEASE ORAL
Qty: 90 TABLET | Refills: 1 | Status: SHIPPED | OUTPATIENT
Start: 2022-07-14

## 2022-07-14 NOTE — TELEPHONE ENCOUNTER
Refill Request         Last Seen: Last Seen Department: 5/12/2022  Last Seen by PCP: 11/11/2021    Last Written: 09/28/2021    Next Appointment:   Future Appointments   Date Time Provider Yesenia Katiana   11/17/2022  2:00 PM [address], MATILDE KIMBROUGH [place] - [place]       Future appointment scheduled      Requested Prescriptions     Pending Prescriptions Disp Refills    losartan (COZAAR) 50 MG tablet [Pharmacy Med Name: LOSARTAN POTASSIUM 50 MG TAB] 90 tablet 1     Sig: TAKE ONE TABLET BY MOUTH ONCE NIGHTLY    NIFEdipine (PROCARDIA XL) 60 MG extended release tablet [Pharmacy Med Name: NIFEdipine ER 60 MG TAB, 24 HR] 90 tablet 1     Sig: TAKE ONE TABLET BY MOUTH ONCE NIGHTLY

## 2022-10-03 NOTE — PROGRESS NOTES
FOLLOW-UP VISIT      The patient returns for follow-up s/p left knee video arthroscopy with partial medial meniscectomy, loose body removal, and internal fixation of insufficiency fractures in the medial tibial plateau and medial femoral condyle with bone substitute. Date of Surgery    10/13/17    Wound Status    Sutures Removed, Incisions are healing well with no surrounding erythema, and minimal ecchymosis. Exam    She has no signs of infection or DVT. She is walking without ambulatory aids happy with her result to date.     Plan    Slow return to normal activity at home directed physical therapy program.    Follow-up Appointment    4 weeks denies pain/discomfort

## 2022-11-16 PROBLEM — M25.562 ACUTE PAIN OF LEFT KNEE: Status: RESOLVED | Noted: 2017-04-25 | Resolved: 2022-11-16

## 2022-11-16 PROBLEM — L03.90 CELLULITIS: Status: RESOLVED | Noted: 2021-11-11 | Resolved: 2022-11-16

## 2022-11-21 ENCOUNTER — OFFICE VISIT (OUTPATIENT)
Dept: FAMILY MEDICINE CLINIC | Age: 62
End: 2022-11-21
Payer: COMMERCIAL

## 2022-11-21 VITALS
OXYGEN SATURATION: 96 % | WEIGHT: 293 LBS | HEART RATE: 100 BPM | SYSTOLIC BLOOD PRESSURE: 124 MMHG | HEIGHT: 68 IN | BODY MASS INDEX: 44.41 KG/M2 | DIASTOLIC BLOOD PRESSURE: 76 MMHG | TEMPERATURE: 97.5 F | RESPIRATION RATE: 18 BRPM

## 2022-11-21 DIAGNOSIS — R73.09 ELEVATED GLUCOSE: Primary | ICD-10-CM

## 2022-11-21 DIAGNOSIS — G89.29 CHRONIC BILATERAL LOW BACK PAIN WITHOUT SCIATICA: ICD-10-CM

## 2022-11-21 DIAGNOSIS — D50.9 IRON DEFICIENCY ANEMIA, UNSPECIFIED IRON DEFICIENCY ANEMIA TYPE: ICD-10-CM

## 2022-11-21 DIAGNOSIS — E78.00 PURE HYPERCHOLESTEROLEMIA: ICD-10-CM

## 2022-11-21 DIAGNOSIS — Z23 NEED FOR INFLUENZA VACCINATION: ICD-10-CM

## 2022-11-21 DIAGNOSIS — Z98.84 S/P LAPAROSCOPIC SLEEVE GASTRECTOMY: ICD-10-CM

## 2022-11-21 DIAGNOSIS — E66.01 CLASS 3 SEVERE OBESITY DUE TO EXCESS CALORIES WITH SERIOUS COMORBIDITY AND BODY MASS INDEX (BMI) OF 50.0 TO 59.9 IN ADULT (HCC): ICD-10-CM

## 2022-11-21 DIAGNOSIS — E66.2 OBESITY HYPOVENTILATION SYNDROME (HCC): ICD-10-CM

## 2022-11-21 DIAGNOSIS — K21.9 GASTROESOPHAGEAL REFLUX DISEASE WITHOUT ESOPHAGITIS: ICD-10-CM

## 2022-11-21 DIAGNOSIS — M54.50 CHRONIC BILATERAL LOW BACK PAIN WITHOUT SCIATICA: ICD-10-CM

## 2022-11-21 DIAGNOSIS — I10 HYPERTENSION, UNSPECIFIED TYPE: ICD-10-CM

## 2022-11-21 LAB — HBA1C MFR BLD: 5.4 %

## 2022-11-21 PROCEDURE — 36415 COLL VENOUS BLD VENIPUNCTURE: CPT | Performed by: PHYSICIAN ASSISTANT

## 2022-11-21 PROCEDURE — 90472 IMMUNIZATION ADMIN EACH ADD: CPT | Performed by: PHYSICIAN ASSISTANT

## 2022-11-21 PROCEDURE — 83036 HEMOGLOBIN GLYCOSYLATED A1C: CPT | Performed by: PHYSICIAN ASSISTANT

## 2022-11-21 PROCEDURE — 90471 IMMUNIZATION ADMIN: CPT | Performed by: PHYSICIAN ASSISTANT

## 2022-11-21 PROCEDURE — 99214 OFFICE O/P EST MOD 30 MIN: CPT | Performed by: PHYSICIAN ASSISTANT

## 2022-11-21 PROCEDURE — 90674 CCIIV4 VAC NO PRSV 0.5 ML IM: CPT | Performed by: PHYSICIAN ASSISTANT

## 2022-11-21 PROCEDURE — 3074F SYST BP LT 130 MM HG: CPT | Performed by: PHYSICIAN ASSISTANT

## 2022-11-21 PROCEDURE — 90750 HZV VACC RECOMBINANT IM: CPT | Performed by: PHYSICIAN ASSISTANT

## 2022-11-21 PROCEDURE — 3078F DIAST BP <80 MM HG: CPT | Performed by: PHYSICIAN ASSISTANT

## 2022-11-21 SDOH — ECONOMIC STABILITY: FOOD INSECURITY: WITHIN THE PAST 12 MONTHS, THE FOOD YOU BOUGHT JUST DIDN'T LAST AND YOU DIDN'T HAVE MONEY TO GET MORE.: NEVER TRUE

## 2022-11-21 SDOH — ECONOMIC STABILITY: FOOD INSECURITY: WITHIN THE PAST 12 MONTHS, YOU WORRIED THAT YOUR FOOD WOULD RUN OUT BEFORE YOU GOT MONEY TO BUY MORE.: NEVER TRUE

## 2022-11-21 ASSESSMENT — PATIENT HEALTH QUESTIONNAIRE - PHQ9
5. POOR APPETITE OR OVEREATING: 0
8. MOVING OR SPEAKING SO SLOWLY THAT OTHER PEOPLE COULD HAVE NOTICED. OR THE OPPOSITE, BEING SO FIGETY OR RESTLESS THAT YOU HAVE BEEN MOVING AROUND A LOT MORE THAN USUAL: 0
SUM OF ALL RESPONSES TO PHQ QUESTIONS 1-9: 5
SUM OF ALL RESPONSES TO PHQ QUESTIONS 1-9: 5
4. FEELING TIRED OR HAVING LITTLE ENERGY: 0
SUM OF ALL RESPONSES TO PHQ QUESTIONS 1-9: 5
SUM OF ALL RESPONSES TO PHQ9 QUESTIONS 1 & 2: 5
3. TROUBLE FALLING OR STAYING ASLEEP: 0
7. TROUBLE CONCENTRATING ON THINGS, SUCH AS READING THE NEWSPAPER OR WATCHING TELEVISION: 0
9. THOUGHTS THAT YOU WOULD BE BETTER OFF DEAD, OR OF HURTING YOURSELF: 0
2. FEELING DOWN, DEPRESSED OR HOPELESS: 3
1. LITTLE INTEREST OR PLEASURE IN DOING THINGS: 2
SUM OF ALL RESPONSES TO PHQ QUESTIONS 1-9: 5
6. FEELING BAD ABOUT YOURSELF - OR THAT YOU ARE A FAILURE OR HAVE LET YOURSELF OR YOUR FAMILY DOWN: 0
10. IF YOU CHECKED OFF ANY PROBLEMS, HOW DIFFICULT HAVE THESE PROBLEMS MADE IT FOR YOU TO DO YOUR WORK, TAKE CARE OF THINGS AT HOME, OR GET ALONG WITH OTHER PEOPLE: 0

## 2022-11-21 ASSESSMENT — SOCIAL DETERMINANTS OF HEALTH (SDOH): HOW HARD IS IT FOR YOU TO PAY FOR THE VERY BASICS LIKE FOOD, HOUSING, MEDICAL CARE, AND HEATING?: NOT HARD AT ALL

## 2022-11-21 NOTE — PROGRESS NOTES
Eliana Martinez [address] [place] [address]  11/21/22       IMPRESSION/ PLAN     1. Elevated glucose    2. Hypertension, unspecified type    3. Class 3 severe obesity due to excess calories with serious comorbidity and body mass index (BMI) of 50.0 to 59.9 in adult ([facility] [address]    4. S/P laparoscopic sleeve gastrectomy    5. Iron deficiency anemia, unspecified iron deficiency anemia type    6. Obesity hypoventilation syndrome ([facility] [address]    7. Gastroesophageal reflux disease without esophagitis    8. Chronic bilateral low back pain without sciatica    9. Pure hypercholesterolemia    10. Need for influenza vaccination        Elevated glucose  Hemoglobin A1C   Date Value   11/21/2022 5.4 %   08/11/2015 5.2 %   01/30/2013 5.0    Continue to monitor. Moderate episode of recurrent major depressive disorder (HCC)  on Citalopram, Trazodone, and Clonazepam. Sees Psychiatrist regularly. Poor self image since weight returned. Class 3 severe obesity due to excess calories with serious comorbidity and body mass index (BMI) of 50.0 to 59.9 in adult [facility])  S/P laparoscopic sleeve gastrectomy  -     Ambulatory Referral to NON Bariatric Surgery, Dr Pedro Luis Mcallister. Does not wish to have another surgery. ESTHER (obstructive sleep apnea)  -     Refuses to use CPAP. Causes sleep pattern to be worse. Hypercholesteremia  -     Lipid Panel  -     Comprehensive Metabolic Panel     Chronic low back pain.  -zanaflex prn. Stay mobile. IBS with occasional  Blood in stool   findings of iron deficient anemia. Perform work-up today. Follow Up every 6 mo      CHIEF COMPLAINT  Chief Complaint   Patient presents with    6 Month Follow-Up     Patient is here for a 6 month follow up, she is fasting for blood work    Hypertension    Hyperlipidemia     HISTORY OF PRESENT  ILLNESS  Lissett Perez is a 57 y.o.  female  6 mo Follow up.   Admits not having mammogram or updated vaccines because she fears becoming ill      Obesity, morbid, BMI 55, s/p Gastric bypass  2015 then divorce and fathers illness spiraled her back to eating and gained all wt back. Does not want another surgery. Willing to see bariatric specialist.       Moderate episode of recurrent major depressive disorder (HCC)  Take citalopram, Trazodone, and Clonazepam. Sees Psychiatrist regularly per virtual visits. .  Poor self image since weight returned. Obstructive sleep apnea syndrome  Didn't use C-pap and got rid of it. More annoying than helpful. Essential hypertension  No change in meds, no c/o with meds, no chest pain, SOB, palpatations, or syncope. Home bp not taken. Pure hypercholesterolemia  Poor diet and wt is up . No c/o w/ meds. Chronic left hip pain and lower back pain  Attributes this to her weight. Severe pain on/off. Muscle relaxant do not help. IBS- with blood and mostly diarrhea. Anemia-found on last exam.  Denies dizziness lightheadedness weakness. ROS:  Remaining 14 ROS were reviewed and are unremarkable for other constitutional,   EENT, cardiac, pulmonary, GI, , neurologic, musculoskeletal, or integumentary complaints. PAST MEDICAL/SURGICAL, SOCIAL, &  FAMILY HISTORY:  Reviewed and updated accordingly. ALLERGIES : Patient has no known allergies. MEDICATIONS:  Current Outpatient Medications   Medication Sig Dispense Refill    losartan (COZAAR) 50 MG tablet TAKE ONE TABLET BY MOUTH ONCE NIGHTLY 90 tablet 1    NIFEdipine (PROCARDIA XL) 60 MG extended release tablet TAKE ONE TABLET BY MOUTH ONCE NIGHTLY 90 tablet 1    atorvastatin (LIPITOR) 20 MG tablet TAKE ONE TABLET BY MOUTH DAILY 90 tablet 1    omeprazole (PRILOSEC) 20 MG delayed release capsule TAKE ONE CAPSULE BY MOUTH EVERY MORNING BEFORE BREAKFAST 90 capsule 2    clonazePAM (KLONOPIN) 2 MG tablet Take 1 tablet by mouth nightly for 3 days.  3 tablet 0    citalopram (CELEXA) 40 MG tablet Take 40 mg by mouth nightly       traZODone (DESYREL) 50 MG tablet Take 100 mg by mouth nightly        No current facility-administered medications for this visit. PHYSICAL EXAM     Vitals:    11/21/22 1510   BP: 124/76   Pulse: 100   Resp: 18   Temp: 97.5 °F (36.4 °C)   TempSrc: Temporal   SpO2: 96%   Weight: (!) 359 lb (162.8 kg)   Height: 5' 8\" (1.727 m)   Body mass index is 54.59 kg/m². APPEARANCE: Well nourished. No distress. HEENT: Head: Normocephalic, atraumatic. EOMI,PERRLA. Conjunctiva pink and moist. Sclera white. Hearing intact. Nares patent. Oral mucosa moist. Throat clear. NECK: No lymphadenopathy or masses. Thyroid is smooth. Moves neck fully. HEART: Reg rate and rhythm. No murmurs, rubs, or gallops. LUNGS: Clear to auscultation. No wheezes, rales, or rhonchi. ABDOMEN:  Soft, bowel sounds present, non-tender, no masses or organomegaly. MUSCULOSKELETAL:  No clubbing, cyanosis or edema. Moves all joints without eliciting pain. NEUROLOGIC: Grossly non focal.   SKIN: Warm, dry, normal turgor. Cap refill <3secs. No rashes, petechiae, purpura. PSYCHIATRIC:  Mood, behavior, and judgement normal. Thought content normal.      ADDITIONAL STUDIES     Pertinent prior laboratory results and/or imaging reviewed.    Orders Placed This Encounter   Procedures    Influenza, FLUCELVAX, (age 10 mo+), IM, Preservative Free, 0.5 mL    Zoster, SHINGRIX, (18 yrs +), IM    Comprehensive Metabolic Panel    Lipid Panel    CBC with Auto Differential    Ferritin    Iron and TIBC    TSH with Reflex to FT4    Ambulatory Referral to Bariatric Surgery    POCT glycosylated hemoglobin (Hb A1C)       Electronically signed by MATILDE Gomez on 11/21/2022 at 4:53 PM

## 2022-11-21 NOTE — PROGRESS NOTES
Vaccine Information Sheet, \"Influenza - Inactivated\"  given to Annika Santos, or parent/legal guardian of  Annika Santos and verbalized understanding. Patient responses:    Have you ever had a reaction to a flu vaccine? No  Do you have any current illness? No  Have you ever had Guillian [place] Syndrome? No  Do you have a serious allergy to any of the follow: Neomycin, Polymyxin, Thimerosal, eggs or egg products? No    Flu vaccine given per order. Please see immunization tab. Risks and benefits explained. Current VIS given.

## 2022-11-22 LAB
A/G RATIO: 1.6 (ref 1.1–2.2)
ALBUMIN SERPL-MCNC: 4.2 G/DL (ref 3.4–5)
ALP BLD-CCNC: 137 U/L (ref 40–129)
ALT SERPL-CCNC: 13 U/L (ref 10–40)
ANION GAP SERPL CALCULATED.3IONS-SCNC: 19 MMOL/L (ref 3–16)
AST SERPL-CCNC: 19 U/L (ref 15–37)
BASOPHILS ABSOLUTE: 0.1 K/UL (ref 0–0.2)
BASOPHILS RELATIVE PERCENT: 0.8 %
BILIRUB SERPL-MCNC: 0.4 MG/DL (ref 0–1)
BUN BLDV-MCNC: 8 MG/DL (ref 7–20)
CALCIUM SERPL-MCNC: 9 MG/DL (ref 8.3–10.6)
CHLORIDE BLD-SCNC: 104 MMOL/L (ref 99–110)
CHOLESTEROL, TOTAL: 151 MG/DL (ref 0–199)
CO2: 20 MMOL/L (ref 21–32)
CREAT SERPL-MCNC: 0.8 MG/DL (ref 0.6–1.2)
EOSINOPHILS ABSOLUTE: 0 K/UL (ref 0–0.6)
EOSINOPHILS RELATIVE PERCENT: 0.1 %
FERRITIN: 13 NG/ML (ref 15–150)
GFR SERPL CREATININE-BSD FRML MDRD: >60 ML/MIN/{1.73_M2}
GLUCOSE BLD-MCNC: 124 MG/DL (ref 70–99)
HCT VFR BLD CALC: 41.9 % (ref 36–48)
HDLC SERPL-MCNC: 46 MG/DL (ref 40–60)
HEMOGLOBIN: 13.2 G/DL (ref 12–16)
IRON SATURATION: 8 % (ref 15–50)
IRON: 40 UG/DL (ref 37–145)
LDL CHOLESTEROL CALCULATED: 71 MG/DL
LYMPHOCYTES ABSOLUTE: 1.6 K/UL (ref 1–5.1)
LYMPHOCYTES RELATIVE PERCENT: 19.5 %
MCH RBC QN AUTO: 24.2 PG (ref 26–34)
MCHC RBC AUTO-ENTMCNC: 31.5 G/DL (ref 31–36)
MCV RBC AUTO: 76.8 FL (ref 80–100)
MONOCYTES ABSOLUTE: 0.5 K/UL (ref 0–1.3)
MONOCYTES RELATIVE PERCENT: 6.6 %
NEUTROPHILS ABSOLUTE: 5.8 K/UL (ref 1.7–7.7)
NEUTROPHILS RELATIVE PERCENT: 73 %
PDW BLD-RTO: 16.6 % (ref 12.4–15.4)
PLATELET # BLD: 296 K/UL (ref 135–450)
PMV BLD AUTO: 8 FL (ref 5–10.5)
POTASSIUM SERPL-SCNC: 4 MMOL/L (ref 3.5–5.1)
RBC # BLD: 5.45 M/UL (ref 4–5.2)
SODIUM BLD-SCNC: 143 MMOL/L (ref 136–145)
TOTAL IRON BINDING CAPACITY: 484 UG/DL (ref 260–445)
TOTAL PROTEIN: 6.9 G/DL (ref 6.4–8.2)
TRIGL SERPL-MCNC: 172 MG/DL (ref 0–150)
TSH REFLEX FT4: 2.17 UIU/ML (ref 0.27–4.2)
VLDLC SERPL CALC-MCNC: 34 MG/DL
WBC # BLD: 8 K/UL (ref 4–11)

## 2023-01-26 RX ORDER — ATORVASTATIN CALCIUM 20 MG/1
TABLET, FILM COATED ORAL
Qty: 90 TABLET | Refills: 1 | Status: SHIPPED | OUTPATIENT
Start: 2023-01-26

## 2023-01-26 NOTE — TELEPHONE ENCOUNTER
Leonor Kulkarni is requesting refill(s) lipitor  Last OV 11/21/22 (pertaining to medication)  LR 6/15/22 (per medication requested)  Next office visit scheduled or attempted No   If no, reason:  Pt is due in May

## 2023-02-02 NOTE — TELEPHONE ENCOUNTER
[place] N[place]olr is requesting refill(s) losartan, nifedipine  Last OV 11/21/22 (pertaining to medication)  LR 7/14/22 (per medication requested)  Next office visit scheduled or attempted No   If no, reason:

## 2023-02-03 RX ORDER — LOSARTAN POTASSIUM 50 MG/1
TABLET ORAL
Qty: 90 TABLET | Refills: 1 | Status: SHIPPED | OUTPATIENT
Start: 2023-02-03

## 2023-02-03 RX ORDER — NIFEDIPINE 60 MG/1
TABLET, EXTENDED RELEASE ORAL
Qty: 90 TABLET | Refills: 1 | Status: SHIPPED | OUTPATIENT
Start: 2023-02-03

## 2023-03-30 NOTE — FLOWSHEET NOTE
[address] [place] [address] and [facility], [address] [place] [address] [address]  Phone: [phone]   Fax:     [phone]      Physical Therapy Treatment Note/ Progress Report:     Date:  8/3/2021    Patient Name:  Gabe Malhotra  :  1960  MRN: [id number]  Restrictions/Precautions:    Medical/Treatment Diagnosis Information:  · Diagnosis: Status post total left knee replacement Z96.652; Left knee pain M25. 562  · Treatment Diagnosis: Left knee pain M25. 960  Insurance/Certification information:  PT Insurance Information: [facility] Choice  Physician Information:  Referring Practitioner: Dr. Indra Roblero  Has the plan of care been signed (Y/N):        []  Yes  [x]  No     Date of Patient follow up with Physician: 1 month    Is this a Progress Report:     []  Yes  [x]  No      If Yes:  Date Range for reporting period:  Beginnin/3/21 ------------ Endin/3/21    Progress report will be due (10 Rx or 30 days whichever is less): 5/3/79       Recertification will be due (POC Duration  / 90 days whichever is less): 11/3/21        Visit # Insurance Allowable Auth Required   In Person 1 30 []  Yes     []  No    Tele Health 0  []  Yes     []  No    Total 1       Functional Scale: LEFS 79%   Date assessed:  8/3/21      Latex Allergy:  [x]NO      []YES  Preferred Language for Healthcare:   [x]English       []other:    Pain level:  0-6/10     SUBJECTIVE:  See eval    OBJECTIVE: See eval   Observation:    Test measurements:  Lacking 3-104 at conclusion    RESTRICTIONS/PRECAUTIONS: s/p L TKA 21  Hx R TKA     Exercises/Interventions:   Therapeutic Ex (07532) Sets/sec Reps Notes/CUES HEP   Bike ROM  Calf stretch belt 5 min  20-30s   3   vc    Heel prop  1:20 Vc    Quad sets 10s 10 vc    Heel slides supine/seated  10 vc    SLR  10 vc    TKE  10 Blue, vc                                       Pt education 10'  Reviewed HEP with gradual progression, goals of PT, not to push through pain with ex, use of ice- pt stated understanding    Manual Intervention ([facility])       STM quad mm, patellar mobs 8 min                                         NMR re-education (21659)   CUES NEEDED                                                                   Therapeutic Activity (18624)                                          [facility] access code:   [email or web address]           Therapeutic Exercise and NMR EXR  [x] (13497) Provided verbal/tactile cueing for activities related to strengthening, flexibility, endurance, ROM for improvements in LE, proximal hip, and core control with self care, mobility, lifting, ambulation. [x] (42445) Provided verbal/tactile cueing for activities related to improving balance, coordination, kinesthetic sense, posture, motor skill, proprioception to assist with LE, proximal hip, and core control in self-care, mobility, lifting, ambulation and eccentric single leg control.      NMR and Therapeutic Activities:    [x] (40215 or 78812) Provided verbal/tactile cueing for activities related to improving balance, coordination, kinesthetic sense, posture, motor skill, proprioception and motor activation to allow for proper function of core, proximal hip and LE with self-care and ADLs and functional mobility.   [] (80425) Gait Re-education- Provided training and instruction to the patient for proper LE, core and proximal hip recruitment and positioning and eccentric body weight control with ambulation re-education including up and down stairs     Home Exercise Program:    [x] (22217) Reviewed/Progressed HEP activities related to strengthening, flexibility, endurance, ROM of core, proximal hip and LE for functional self-care, mobility, lifting and ambulation/stair navigation   [] (40295) Reviewed/Progressed HEP activities related to improving balance, coordination, kinesthetic sense, posture, motor skill, proprioception of core, proximal hip and LE for self-care, mobility, lifting, and ambulation/stair navigation      Manual Treatments:  PROM / STM / Oscillations-Mobs:  G-I, II, III, IV (PA's, Inf., Post.)  [] (51210) Provided manual therapy to mobilize LE, proximal hip and/or LS spine soft tissue/joints for the purpose of modulating pain, promoting relaxation, increasing ROM, reducing/eliminating soft tissue swelling/inflammation/restriction, improving soft tissue extensibility and allowing for proper ROM for normal function with self-care, mobility, lifting and ambulation. Modalities:     [] GAME READY (VASO)- for significant edema, swelling, pain control. Charges:  Timed Code Treatment Minutes: 38   Total Treatment Minutes:  48   BWC:  TE TIME:  NMR TIME:  MANUAL TIME:  UNTIMED MINUTES:  Medicare Total:         [x] EVAL (LOW) 48070 (typically 20 minutes face-to-face)  [] EVAL (MOD) 58711 (typically 30 minutes face-to-face)  [] EVAL (HIGH) 75748 (typically 45 minutes face-to-face)  [] RE-EVAL     [x] LN(24351) x   2  [] IONTO  [] NMR (70291) x     [] VASO  [x] Manual (67456) x     [] Other:  [] TA x      [] Mech Traction (90443)  [] ES(attended) (67333)      [] ES (un) (17725):    ASSESSMENT:  See eval    GOALS:   Patient stated goal: Normal walking without pain. Therapist goals for Patient:   Short Term Goals: To be achieved in: 2 weeks  1. Independent in HEP and progression per patient tolerance, in order to prevent re-injury. [] Progressing: [] Met: [] Not Met: [] Adjusted     2. Patient will have a decrease in pain to facilitate improvement in movement, function, and ADLs as indicated by Functional Deficits. [] Progressing: [] Met: [] Not Met: [] Adjusted     Long Term Goals: To be achieved in: 12 weeks  1. Disability index score of 37% or less for the LEFS to assist with reaching prior level of function. [] Progressing: [] Met: [] Not Met: [] Adjusted     2.  Patient will demonstrate increased AROM to 0-110 to allow for proper joint functioning as indicated by patients Functional Deficits. [] Progressing: [] Met: [] Not Met: [] Adjusted     3. Patient will demonstrate an increase in Strength to good proximal hip strength and control, within 5lb HHD in LE to allow for proper functional mobility as indicated by patients Functional Deficits. [] Progressing: [] Met: [] Not Met: [] Adjusted     4. Patient will return to functional activities including standing/walking 15-20 mins I with LRAD without increased symptoms or restriction. [] Progressing: [] Met: [] Not Met: [] Adjusted     5. Patient will perform functional sit to stand from chair I without restriction (patient specific functional goal)    [] Progressing: [] Met: [] Not Met: [] Adjusted         Overall Progression Towards Functional goals/ Treatment Progress Update:  [] Patient is progressing as expected towards functional goals listed. [] Progression is slowed due to complexities/Impairments listed. [] Progression has been slowed due to co-morbidities.   [x] Plan just implemented, too soon to assess goals progression <30days   [] Goals require adjustment due to lack of progress  [] Patient is not progressing as expected and requires additional follow up with physician  [] Other    Prognosis for POC: [x] Good [] Fair  [] Poor      Patient requires continued skilled intervention: [x] Yes  [] No    Treatment/Activity Tolerance:  [x] Patient able to complete treatment  [] Patient limited by fatigue  [] Patient limited by pain    [] Patient limited by other medical complications  [] Other:     Return to Play: (if applicable)   []  Stage 1: Intro to Strength   []  Stage 2: Return to Run and Strength   []  Stage 3: Return to Jump and Strength   []  Stage 4: Dynamic Strength and Agility   []  Stage 5: Sport Specific Training     []  Ready to Return to Play, Meets All Above Stages   []  Not Ready for Return to Sports   Comments:                          PLAN: See eval  [] Continue per plan of care [] Alter current plan (see comments above)  [x] Plan of care initiated [] Hold pending MD visit [] Discharge    Electronically signed by:  Jaime Starr PT    Note: If patient does not return for scheduled/ recommended follow up visits, this note will serve as a discharge from care along with most recent update on progress. Island Pedicle Flap With Canthal Suspension Text: The defect edges were debeveled with a #15 scalpel blade.  Given the location of the defect, shape of the defect and the proximity to free margins an island pedicle advancement flap was deemed most appropriate.  Using a sterile surgical marker, an appropriate advancement flap was drawn incorporating the defect, outlining the appropriate donor tissue and placing the expected incisions within the relaxed skin tension lines where possible. The area thus outlined was incised deep to adipose tissue with a #15 scalpel blade.  The skin margins were undermined to an appropriate distance in all directions around the primary defect and laterally outward around the island pedicle utilizing iris scissors.  There was minimal undermining beneath the pedicle flap. A suspension suture was placed in the canthal tendon to prevent tension and prevent ectropion.

## 2023-05-25 ENCOUNTER — OFFICE VISIT (OUTPATIENT)
Dept: FAMILY MEDICINE CLINIC | Age: 63
End: 2023-05-25
Payer: COMMERCIAL

## 2023-05-25 VITALS
OXYGEN SATURATION: 98 % | RESPIRATION RATE: 18 BRPM | SYSTOLIC BLOOD PRESSURE: 122 MMHG | DIASTOLIC BLOOD PRESSURE: 80 MMHG | BODY MASS INDEX: 44.41 KG/M2 | HEART RATE: 96 BPM | TEMPERATURE: 97.6 F | HEIGHT: 68 IN | WEIGHT: 293 LBS

## 2023-05-25 DIAGNOSIS — E78.00 PURE HYPERCHOLESTEROLEMIA: ICD-10-CM

## 2023-05-25 DIAGNOSIS — E66.01 CLASS 3 SEVERE OBESITY DUE TO EXCESS CALORIES WITH SERIOUS COMORBIDITY AND BODY MASS INDEX (BMI) OF 50.0 TO 59.9 IN ADULT (HCC): ICD-10-CM

## 2023-05-25 DIAGNOSIS — K21.9 GASTROESOPHAGEAL REFLUX DISEASE WITHOUT ESOPHAGITIS: ICD-10-CM

## 2023-05-25 DIAGNOSIS — G47.33 OSA (OBSTRUCTIVE SLEEP APNEA): ICD-10-CM

## 2023-05-25 DIAGNOSIS — R73.09 ELEVATED GLUCOSE: ICD-10-CM

## 2023-05-25 DIAGNOSIS — K92.1 BLOOD IN STOOL: ICD-10-CM

## 2023-05-25 DIAGNOSIS — R53.81 MALAISE AND FATIGUE: ICD-10-CM

## 2023-05-25 DIAGNOSIS — D50.9 IRON DEFICIENCY ANEMIA, UNSPECIFIED IRON DEFICIENCY ANEMIA TYPE: ICD-10-CM

## 2023-05-25 DIAGNOSIS — R53.83 MALAISE AND FATIGUE: ICD-10-CM

## 2023-05-25 DIAGNOSIS — I10 HYPERTENSION, UNSPECIFIED TYPE: Primary | ICD-10-CM

## 2023-05-25 LAB — HBA1C MFR BLD: 5.4 %

## 2023-05-25 PROCEDURE — 36415 COLL VENOUS BLD VENIPUNCTURE: CPT | Performed by: PHYSICIAN ASSISTANT

## 2023-05-25 PROCEDURE — 99214 OFFICE O/P EST MOD 30 MIN: CPT | Performed by: PHYSICIAN ASSISTANT

## 2023-05-25 PROCEDURE — 83036 HEMOGLOBIN GLYCOSYLATED A1C: CPT | Performed by: PHYSICIAN ASSISTANT

## 2023-05-25 PROCEDURE — 3079F DIAST BP 80-89 MM HG: CPT | Performed by: PHYSICIAN ASSISTANT

## 2023-05-25 PROCEDURE — 3074F SYST BP LT 130 MM HG: CPT | Performed by: PHYSICIAN ASSISTANT

## 2023-05-25 RX ORDER — ATORVASTATIN CALCIUM 20 MG/1
20 TABLET, FILM COATED ORAL DAILY
Qty: 90 TABLET | Refills: 2 | Status: SHIPPED | OUTPATIENT
Start: 2023-05-25

## 2023-05-25 RX ORDER — OMEPRAZOLE 20 MG/1
CAPSULE, DELAYED RELEASE ORAL
Qty: 90 CAPSULE | Refills: 2 | Status: SHIPPED | OUTPATIENT
Start: 2023-05-25

## 2023-05-25 ASSESSMENT — PATIENT HEALTH QUESTIONNAIRE - PHQ9
SUM OF ALL RESPONSES TO PHQ QUESTIONS 1-9: 17
SUM OF ALL RESPONSES TO PHQ QUESTIONS 1-9: 17
8. MOVING OR SPEAKING SO SLOWLY THAT OTHER PEOPLE COULD HAVE NOTICED. OR THE OPPOSITE, BEING SO FIGETY OR RESTLESS THAT YOU HAVE BEEN MOVING AROUND A LOT MORE THAN USUAL: 0
SUM OF ALL RESPONSES TO PHQ QUESTIONS 1-9: 17
10. IF YOU CHECKED OFF ANY PROBLEMS, HOW DIFFICULT HAVE THESE PROBLEMS MADE IT FOR YOU TO DO YOUR WORK, TAKE CARE OF THINGS AT HOME, OR GET ALONG WITH OTHER PEOPLE: 3
9. THOUGHTS THAT YOU WOULD BE BETTER OFF DEAD, OR OF HURTING YOURSELF: 0
2. FEELING DOWN, DEPRESSED OR HOPELESS: 3
3. TROUBLE FALLING OR STAYING ASLEEP: 2
4. FEELING TIRED OR HAVING LITTLE ENERGY: 3
SUM OF ALL RESPONSES TO PHQ QUESTIONS 1-9: 17
1. LITTLE INTEREST OR PLEASURE IN DOING THINGS: 3
SUM OF ALL RESPONSES TO PHQ9 QUESTIONS 1 & 2: 6
6. FEELING BAD ABOUT YOURSELF - OR THAT YOU ARE A FAILURE OR HAVE LET YOURSELF OR YOUR FAMILY DOWN: 3
7. TROUBLE CONCENTRATING ON THINGS, SUCH AS READING THE NEWSPAPER OR WATCHING TELEVISION: 0
5. POOR APPETITE OR OVEREATING: 3

## 2023-05-25 ASSESSMENT — COLUMBIA-SUICIDE SEVERITY RATING SCALE - C-SSRS
1. WITHIN THE PAST MONTH, HAVE YOU WISHED YOU WERE DEAD OR WISHED YOU COULD GO TO SLEEP AND NOT WAKE UP?: NO
6. HAVE YOU EVER DONE ANYTHING, STARTED TO DO ANYTHING, OR PREPARED TO DO ANYTHING TO END YOUR LIFE?: NO
2. HAVE YOU ACTUALLY HAD ANY THOUGHTS OF KILLING YOURSELF?: NO

## 2023-05-26 ENCOUNTER — TELEPHONE (OUTPATIENT)
Dept: ADMINISTRATIVE | Age: 63
End: 2023-05-26

## 2023-05-26 ENCOUNTER — TELEPHONE (OUTPATIENT)
Dept: FAMILY MEDICINE CLINIC | Age: 63
End: 2023-05-26

## 2023-05-26 DIAGNOSIS — E66.01 CLASS 3 SEVERE OBESITY DUE TO EXCESS CALORIES WITH SERIOUS COMORBIDITY AND BODY MASS INDEX (BMI) OF 50.0 TO 59.9 IN ADULT (HCC): ICD-10-CM

## 2023-05-26 DIAGNOSIS — Z90.3 H/O GASTRIC SLEEVE: ICD-10-CM

## 2023-05-26 DIAGNOSIS — K92.1 BLOOD IN STOOL: ICD-10-CM

## 2023-05-26 DIAGNOSIS — D50.9 IRON DEFICIENCY ANEMIA, UNSPECIFIED IRON DEFICIENCY ANEMIA TYPE: Primary | ICD-10-CM

## 2023-05-26 DIAGNOSIS — R79.89 ABNORMAL CBC: ICD-10-CM

## 2023-05-26 LAB
ALBUMIN SERPL-MCNC: 4.2 G/DL (ref 3.4–5)
ALBUMIN/GLOB SERPL: 1.5 {RATIO} (ref 1.1–2.2)
ALP SERPL-CCNC: 141 U/L (ref 40–129)
ALT SERPL-CCNC: 12 U/L (ref 10–40)
ANION GAP SERPL CALCULATED.3IONS-SCNC: 15 MMOL/L (ref 3–16)
AST SERPL-CCNC: 17 U/L (ref 15–37)
BASOPHILS # BLD: 0.1 K/UL (ref 0–0.2)
BASOPHILS NFR BLD: 1.1 %
BILIRUB SERPL-MCNC: 0.5 MG/DL (ref 0–1)
BUN SERPL-MCNC: 6 MG/DL (ref 7–20)
CALCIUM SERPL-MCNC: 8.5 MG/DL (ref 8.3–10.6)
CHLORIDE SERPL-SCNC: 106 MMOL/L (ref 99–110)
CHOLEST SERPL-MCNC: 153 MG/DL (ref 0–199)
CO2 SERPL-SCNC: 23 MMOL/L (ref 21–32)
CREAT SERPL-MCNC: 0.6 MG/DL (ref 0.6–1.2)
DEPRECATED RDW RBC AUTO: 17.9 % (ref 12.4–15.4)
EOSINOPHIL # BLD: 0.1 K/UL (ref 0–0.6)
EOSINOPHIL NFR BLD: 1 %
FOLATE SERPL-MCNC: 5.7 NG/ML (ref 4.78–24.2)
GFR SERPLBLD CREATININE-BSD FMLA CKD-EPI: >60 ML/MIN/{1.73_M2}
GLUCOSE SERPL-MCNC: 111 MG/DL (ref 70–99)
HCT VFR BLD AUTO: 40 % (ref 36–48)
HDLC SERPL-MCNC: 44 MG/DL (ref 40–60)
HGB BLD-MCNC: 12.5 G/DL (ref 12–16)
IRON SATN MFR SERPL: 8 % (ref 15–50)
IRON SERPL-MCNC: 40 UG/DL (ref 37–145)
LDLC SERPL CALC-MCNC: 81 MG/DL
LYMPHOCYTES # BLD: 1.7 K/UL (ref 1–5.1)
LYMPHOCYTES NFR BLD: 30.1 %
MCH RBC QN AUTO: 22.6 PG (ref 26–34)
MCHC RBC AUTO-ENTMCNC: 31.4 G/DL (ref 31–36)
MCV RBC AUTO: 72.2 FL (ref 80–100)
MONOCYTES # BLD: 0.4 K/UL (ref 0–1.3)
MONOCYTES NFR BLD: 7.7 %
NEUTROPHILS # BLD: 3.5 K/UL (ref 1.7–7.7)
NEUTROPHILS NFR BLD: 60.1 %
PLATELET # BLD AUTO: 280 K/UL (ref 135–450)
PMV BLD AUTO: 7.9 FL (ref 5–10.5)
POTASSIUM SERPL-SCNC: 3.8 MMOL/L (ref 3.5–5.1)
PROT SERPL-MCNC: 7 G/DL (ref 6.4–8.2)
RBC # BLD AUTO: 5.53 M/UL (ref 4–5.2)
SODIUM SERPL-SCNC: 144 MMOL/L (ref 136–145)
TIBC SERPL-MCNC: 486 UG/DL (ref 260–445)
TRIGL SERPL-MCNC: 141 MG/DL (ref 0–150)
VIT B12 SERPL-MCNC: 296 PG/ML (ref 211–911)
VLDLC SERPL CALC-MCNC: 28 MG/DL
WBC # BLD AUTO: 5.8 K/UL (ref 4–11)

## 2023-05-26 RX ORDER — LANOLIN ALCOHOL/MO/W.PET/CERES
325 CREAM (GRAM) TOPICAL 2 TIMES DAILY
Qty: 90 TABLET | Refills: 3 | Status: SHIPPED | OUTPATIENT
Start: 2023-05-26

## 2023-05-26 NOTE — PROGRESS NOTES
Please inform Keisha that her iron anemia is worse, Must get the colonoscopy and supplement with iron prescribed and sent to your pharmacy. The liver functions tests seem to be going up most likely from the poor diet you discussed. Make virtual visit appointment or clinic appointment in 1 month so as to stay on top of this.

## 2023-05-26 NOTE — TELEPHONE ENCOUNTER
Submitted PA for Rybelsus 7MG tablets    [employer] [place] Key: [id number] STATUS: PENDING. Follow up done daily; if no response in three days we will refax for status check. If another three days goes by with no response we will call the insurance for status.

## 2023-05-31 ENCOUNTER — TELEPHONE (OUTPATIENT)
Dept: FAMILY MEDICINE CLINIC | Age: 63
End: 2023-05-31

## 2023-09-15 RX ORDER — LOSARTAN POTASSIUM 50 MG/1
TABLET ORAL
Qty: 90 TABLET | Refills: 1 | Status: SHIPPED | OUTPATIENT
Start: 2023-09-15

## 2023-09-15 RX ORDER — NIFEDIPINE 60 MG/1
TABLET, EXTENDED RELEASE ORAL
Qty: 90 TABLET | Refills: 1 | Status: SHIPPED | OUTPATIENT
Start: 2023-09-15

## 2023-09-15 NOTE — TELEPHONE ENCOUNTER
Alesia Claros is requesting refill(s) losartan, procardia  Last OV 5/25/23 (pertaining to medication)  LR 2/3/23 (per medication requested)  Next office visit scheduled or attempted Yes   If no, reason:  11/30/23

## 2023-11-20 NOTE — PROGRESS NOTES
[address] MEDICINE  11/22/23       IMPRESSION/ PLAN  1. Acute bronchitis, unspecified organism    2. Essential hypertension    3. Mixed hyperlipidemia    4. ESTHER (obstructive sleep apnea)    5. Moderate episode of recurrent major depressive disorder ([address])    6. Irritable bowel syndrome with diarrhea    7. Iron deficiency anemia, unspecified iron deficiency anemia type      Acute Bronchitis- start doxy and tessalon perles, rest, incr fluids. Use OTC symptoms relief meds like dayquil, mucinex. If worse or unresolved, rto or ED. Elevated glucose   With morbid obesity. With wt loss. monitor. Moderate episode of recurrent major depressive disorder (HCC)  -on Citalopram, Trazodone, and changed to  lorazepam.   Seroquel made her too groggy.  -Sees Psychiatrist regularly. Poor self image since weight returned. Class 3 severe obesity BMI 53  S/P laparoscopic sleeve gastrectomy  -     did not see NON Bariatric Surgery. Does not wish to have another surgery. ESTHER (obstructive sleep apnea)  -     Refuses to use CPAP. Causes sleep pattern to be worse. Hypercholesteremia  Continue meds. Check labs at Follow up. Anemia   findings of iron deficient anemia. had colonoscopy. will make appointment with hematologist. discussed iron rich foods. continue iron. Routine follow up 6 mo AND ANNUAL PE and fasting labs. CHIEF COMPLAINT  Chief Complaint   Patient presents with    Cough     Patient is here with complaints of cough, severe congestion, wheezing and she thinks she has pink eye in both eyes. Symptoms began last Friday    Congestion    Eye Problem    Hypertension     Pt is here for a 6 month follow up     Hyperlipidemia     HISTORY OF PRESENT  ILLNESS  Travis Le is a 70 y.o.  female   6 month routine follow up. NEW C/o lower respiratory symptoms which began 6 days ago with cough now green productive. Felt feverish one night. Daughter w same issues.  No upper

## 2023-11-22 ENCOUNTER — OFFICE VISIT (OUTPATIENT)
Dept: FAMILY MEDICINE CLINIC | Age: 63
End: 2023-11-22

## 2023-11-22 VITALS
DIASTOLIC BLOOD PRESSURE: 78 MMHG | SYSTOLIC BLOOD PRESSURE: 116 MMHG | WEIGHT: 293 LBS | BODY MASS INDEX: 44.41 KG/M2 | TEMPERATURE: 99.2 F | HEIGHT: 68 IN | OXYGEN SATURATION: 96 % | RESPIRATION RATE: 20 BRPM | HEART RATE: 90 BPM

## 2023-11-22 DIAGNOSIS — F33.1 MODERATE EPISODE OF RECURRENT MAJOR DEPRESSIVE DISORDER (HCC): ICD-10-CM

## 2023-11-22 DIAGNOSIS — G47.33 OSA (OBSTRUCTIVE SLEEP APNEA): ICD-10-CM

## 2023-11-22 DIAGNOSIS — I10 ESSENTIAL HYPERTENSION: ICD-10-CM

## 2023-11-22 DIAGNOSIS — D50.9 IRON DEFICIENCY ANEMIA, UNSPECIFIED IRON DEFICIENCY ANEMIA TYPE: ICD-10-CM

## 2023-11-22 DIAGNOSIS — K58.0 IRRITABLE BOWEL SYNDROME WITH DIARRHEA: ICD-10-CM

## 2023-11-22 DIAGNOSIS — E78.2 MIXED HYPERLIPIDEMIA: ICD-10-CM

## 2023-11-22 DIAGNOSIS — J20.9 ACUTE BRONCHITIS, UNSPECIFIED ORGANISM: Primary | ICD-10-CM

## 2023-11-22 RX ORDER — BENZONATATE 100 MG/1
100 CAPSULE ORAL 3 TIMES DAILY PRN
Qty: 20 CAPSULE | Refills: 0 | Status: SHIPPED | OUTPATIENT
Start: 2023-11-22 | End: 2023-12-02

## 2023-11-22 RX ORDER — TRAZODONE HYDROCHLORIDE 150 MG/1
1 TABLET ORAL NIGHTLY
COMMUNITY
Start: 2023-11-08

## 2023-11-22 RX ORDER — DOXYCYCLINE HYCLATE 100 MG/1
100 CAPSULE ORAL 2 TIMES DAILY
Qty: 20 CAPSULE | Refills: 0 | Status: SHIPPED | OUTPATIENT
Start: 2023-11-22 | End: 2023-12-02

## 2023-11-22 RX ORDER — LORAZEPAM 2 MG/1
1 TABLET ORAL DAILY
COMMUNITY
Start: 2023-11-10

## 2023-11-22 RX ORDER — QUETIAPINE FUMARATE 50 MG/1
1 TABLET, FILM COATED ORAL DAILY
COMMUNITY
Start: 2023-11-08 | End: 2023-11-22 | Stop reason: CLARIF

## 2023-11-22 SDOH — ECONOMIC STABILITY: INCOME INSECURITY: HOW HARD IS IT FOR YOU TO PAY FOR THE VERY BASICS LIKE FOOD, HOUSING, MEDICAL CARE, AND HEATING?: NOT HARD AT ALL

## 2023-11-22 SDOH — ECONOMIC STABILITY: HOUSING INSECURITY
IN THE LAST 12 MONTHS, WAS THERE A TIME WHEN YOU DID NOT HAVE A STEADY PLACE TO SLEEP OR SLEPT IN A SHELTER (INCLUDING NOW)?: NO

## 2023-11-22 SDOH — ECONOMIC STABILITY: FOOD INSECURITY: WITHIN THE PAST 12 MONTHS, YOU WORRIED THAT YOUR FOOD WOULD RUN OUT BEFORE YOU GOT MONEY TO BUY MORE.: NEVER TRUE

## 2023-11-22 SDOH — ECONOMIC STABILITY: FOOD INSECURITY: WITHIN THE PAST 12 MONTHS, THE FOOD YOU BOUGHT JUST DIDN'T LAST AND YOU DIDN'T HAVE MONEY TO GET MORE.: NEVER TRUE

## 2023-12-12 ENCOUNTER — TELEPHONE (OUTPATIENT)
Dept: FAMILY MEDICINE CLINIC | Age: 63
End: 2023-12-12

## 2023-12-12 NOTE — TELEPHONE ENCOUNTER
LM for pt to call back, is due for Medicare AWV, if she has only been on Medicare for under 12 months needs to be in person.

## 2024-03-16 SDOH — HEALTH STABILITY: PHYSICAL HEALTH: ON AVERAGE, HOW MANY DAYS PER WEEK DO YOU ENGAGE IN MODERATE TO STRENUOUS EXERCISE (LIKE A BRISK WALK)?: 0 DAYS

## 2024-03-16 SDOH — HEALTH STABILITY: PHYSICAL HEALTH: ON AVERAGE, HOW MANY MINUTES DO YOU ENGAGE IN EXERCISE AT THIS LEVEL?: 0 MIN

## 2024-03-16 ASSESSMENT — LIFESTYLE VARIABLES
HOW OFTEN DO YOU HAVE A DRINK CONTAINING ALCOHOL: 1
HOW MANY STANDARD DRINKS CONTAINING ALCOHOL DO YOU HAVE ON A TYPICAL DAY: 0
HOW OFTEN DO YOU HAVE A DRINK CONTAINING ALCOHOL: NEVER
HOW OFTEN DO YOU HAVE SIX OR MORE DRINKS ON ONE OCCASION: 1
HOW MANY STANDARD DRINKS CONTAINING ALCOHOL DO YOU HAVE ON A TYPICAL DAY: PATIENT DOES NOT DRINK

## 2024-03-16 ASSESSMENT — PATIENT HEALTH QUESTIONNAIRE - PHQ9
4. FEELING TIRED OR HAVING LITTLE ENERGY: SEVERAL DAYS
SUM OF ALL RESPONSES TO PHQ QUESTIONS 1-9: 11
SUM OF ALL RESPONSES TO PHQ9 QUESTIONS 1 & 2: 4
8. MOVING OR SPEAKING SO SLOWLY THAT OTHER PEOPLE COULD HAVE NOTICED. OR THE OPPOSITE, BEING SO FIGETY OR RESTLESS THAT YOU HAVE BEEN MOVING AROUND A LOT MORE THAN USUAL: NOT AT ALL
9. THOUGHTS THAT YOU WOULD BE BETTER OFF DEAD, OR OF HURTING YOURSELF: NOT AT ALL
2. FEELING DOWN, DEPRESSED OR HOPELESS: MORE THAN HALF THE DAYS
3. TROUBLE FALLING OR STAYING ASLEEP: MORE THAN HALF THE DAYS
10. IF YOU CHECKED OFF ANY PROBLEMS, HOW DIFFICULT HAVE THESE PROBLEMS MADE IT FOR YOU TO DO YOUR WORK, TAKE CARE OF THINGS AT HOME, OR GET ALONG WITH OTHER PEOPLE: SOMEWHAT DIFFICULT
SUM OF ALL RESPONSES TO PHQ QUESTIONS 1-9: 11
5. POOR APPETITE OR OVEREATING: SEVERAL DAYS
7. TROUBLE CONCENTRATING ON THINGS, SUCH AS READING THE NEWSPAPER OR WATCHING TELEVISION: NOT AT ALL
6. FEELING BAD ABOUT YOURSELF - OR THAT YOU ARE A FAILURE OR HAVE LET YOURSELF OR YOUR FAMILY DOWN: NEARLY EVERY DAY
SUM OF ALL RESPONSES TO PHQ QUESTIONS 1-9: 11
SUM OF ALL RESPONSES TO PHQ QUESTIONS 1-9: 11
1. LITTLE INTEREST OR PLEASURE IN DOING THINGS: MORE THAN HALF THE DAYS

## 2024-03-18 ENCOUNTER — OFFICE VISIT (OUTPATIENT)
Dept: FAMILY MEDICINE CLINIC | Age: 64
End: 2024-03-18
Payer: MEDICARE

## 2024-03-18 VITALS
SYSTOLIC BLOOD PRESSURE: 118 MMHG | RESPIRATION RATE: 18 BRPM | TEMPERATURE: 98.2 F | BODY MASS INDEX: 44.41 KG/M2 | DIASTOLIC BLOOD PRESSURE: 82 MMHG | WEIGHT: 293 LBS | OXYGEN SATURATION: 99 % | HEART RATE: 92 BPM | HEIGHT: 68 IN

## 2024-03-18 DIAGNOSIS — Z87.891 PERSONAL HISTORY OF TOBACCO USE: ICD-10-CM

## 2024-03-18 DIAGNOSIS — Z00.00 WELCOME TO MEDICARE PREVENTIVE VISIT: Primary | ICD-10-CM

## 2024-03-18 PROCEDURE — 1123F ACP DISCUSS/DSCN MKR DOCD: CPT | Performed by: PHYSICIAN ASSISTANT

## 2024-03-18 PROCEDURE — G0296 VISIT TO DETERM LDCT ELIG: HCPCS | Performed by: PHYSICIAN ASSISTANT

## 2024-03-18 PROCEDURE — 3079F DIAST BP 80-89 MM HG: CPT | Performed by: PHYSICIAN ASSISTANT

## 2024-03-18 PROCEDURE — 3074F SYST BP LT 130 MM HG: CPT | Performed by: PHYSICIAN ASSISTANT

## 2024-03-18 PROCEDURE — G0402 INITIAL PREVENTIVE EXAM: HCPCS | Performed by: PHYSICIAN ASSISTANT

## 2024-03-18 NOTE — PROGRESS NOTES
Medicare Annual Wellness Visit    Venecia Viera is here for Medicare AWV (Patient is here for her initial Medicare Annual Wellness Visit.)    Assessment & Plan   Welcome to Medicare preventive visit  Does not want any vaccines. Defers preventive care but will get Lo dose CT lung.   Will think about going to dentist.  Safety discussed.     Personal history of tobacco use  -     [place] VISIT TO DISCUSS LUNG CA SCREEN W LDCT  -     CT Lung Screen (Initial/Annual/Baseline); Future    OBESE   Had gastric sleeve many years ago and does not wish repeat. Willing to take meds. Advised to begin daily bariatric MVI daily.  Recommendations for Preventive Services Due: see orders and patient instructions/AVS.  Recommended screening schedule for the next 5-10 years is provided to the patient in written form: see Patient Instructions/AVS.     Return in 1 year (on 3/18/2025).     Subjective       Patient's complete Health Risk Assessment and screening values have been reviewed and are found in Flowsheets. The following problems were reviewed today and where indicated follow up appointments were made and/or referrals ordered.    Positive Risk Factor Screenings with Interventions:        Depression:  PHQ-2 Score: 4  PHQ-9 Total Score: 11    Interpretation:  5-9 mild   10-14 moderate   15-19 moderately severe   20-27 severe   Interventions:  Patient comments: on meds and sees psychaitrist every 3 mo           Activity, Diet, and Weight:  On average, how many days per week do you engage in moderate to strenuous exercise (like a brisk walk)?: 0 days  On average, how many minutes do you engage in exercise at this level?: 0 min    Do you eat balanced/healthy meals regularly?: (!) No    Body mass index is 50.48 kg/m². (!) Abnormal      Inactivity Interventions:  Patient comments: no activitry   Do you eat balanced/healthy meals regularly Interventions:  Discussed and maria dolores snot want antohter bariatric sx.  Obesity Interventions:  Patient

## 2024-03-18 NOTE — PATIENT INSTRUCTIONS
to get at least 150 minutes of exercise per week or 10,000 steps per day on a pedometer .  Order or download the FREE \"Exercise & Physical Activity: Your Everyday Guide\" from [employer]. Call 1-817.469.9276 or search [employer] online.  You need 9610-3106 mg of calcium and 2108-7658 IU of vitamin D per day. It is possible to meet your calcium requirement with diet alone, but a vitamin D supplement is usually necessary to meet this goal.  When exposed to the sun, use a sunscreen that protects against both UVA and UVB radiation with an SPF of 30 or greater. Reapply every 2 to 3 hours or after sweating, drying off with a towel, or swimming.  Always wear a seat belt when traveling in a car. Always wear a helmet when riding a bicycle or motorcycle.

## 2024-03-27 ENCOUNTER — OFFICE VISIT (OUTPATIENT)
Dept: FAMILY MEDICINE CLINIC | Age: 64
End: 2024-03-27
Payer: MEDICARE

## 2024-03-27 VITALS
HEART RATE: 92 BPM | WEIGHT: 293 LBS | DIASTOLIC BLOOD PRESSURE: 80 MMHG | TEMPERATURE: 97.9 F | OXYGEN SATURATION: 95 % | RESPIRATION RATE: 12 BRPM | SYSTOLIC BLOOD PRESSURE: 118 MMHG | HEIGHT: 68 IN | BODY MASS INDEX: 44.41 KG/M2

## 2024-03-27 DIAGNOSIS — E66.01 CLASS 3 SEVERE OBESITY DUE TO EXCESS CALORIES WITH SERIOUS COMORBIDITY AND BODY MASS INDEX (BMI) OF 50.0 TO 59.9 IN ADULT (HCC): Primary | ICD-10-CM

## 2024-03-27 DIAGNOSIS — E55.9 VITAMIN D DEFICIENCY: ICD-10-CM

## 2024-03-27 DIAGNOSIS — G47.33 OSA (OBSTRUCTIVE SLEEP APNEA): ICD-10-CM

## 2024-03-27 DIAGNOSIS — E78.2 MIXED HYPERLIPIDEMIA: ICD-10-CM

## 2024-03-27 DIAGNOSIS — K21.9 GASTROESOPHAGEAL REFLUX DISEASE WITHOUT ESOPHAGITIS: ICD-10-CM

## 2024-03-27 DIAGNOSIS — I10 ESSENTIAL HYPERTENSION: ICD-10-CM

## 2024-03-27 DIAGNOSIS — Z98.84 S/P LAPAROSCOPIC SLEEVE GASTRECTOMY: ICD-10-CM

## 2024-03-27 DIAGNOSIS — D50.8 IRON DEFICIENCY ANEMIA SECONDARY TO INADEQUATE DIETARY IRON INTAKE: ICD-10-CM

## 2024-03-27 DIAGNOSIS — R73.09 ELEVATED GLUCOSE: ICD-10-CM

## 2024-03-27 PROCEDURE — 3079F DIAST BP 80-89 MM HG: CPT | Performed by: PHYSICIAN ASSISTANT

## 2024-03-27 PROCEDURE — 1123F ACP DISCUSS/DSCN MKR DOCD: CPT | Performed by: PHYSICIAN ASSISTANT

## 2024-03-27 PROCEDURE — 99214 OFFICE O/P EST MOD 30 MIN: CPT | Performed by: PHYSICIAN ASSISTANT

## 2024-03-27 PROCEDURE — 36415 COLL VENOUS BLD VENIPUNCTURE: CPT | Performed by: PHYSICIAN ASSISTANT

## 2024-03-27 PROCEDURE — 3074F SYST BP LT 130 MM HG: CPT | Performed by: PHYSICIAN ASSISTANT

## 2024-03-27 NOTE — PROGRESS NOTES
[facility]  03/27/24       IMPRESSION/ PLAN  Class 3 severe obesity due to excess calories with serious comorbidity and body mass index (BMI) of 50.0 to 59.9 in adult (HCC)    S/P laparoscopic sleeve gastrectomy   -Does not wish to repeat surgical intervention.    -Begin Tirzepatide( Mounjaro/ Zepbound) 0.25mg x 4 wks, then increase to by 2.5mg every 4 weeks to max 15mg weekly.    Anxiety- depression  Followed by psychiatrist, Dr Tavares Viera. Continue Ativan,Celexa, and trazodone. Sees him every 6 mo and zoom q 90 days       Essential hypertension    Elevated glucose    Mixed hyperlipidemia    Vitamin D deficiency    Iron deficiency anemia secondary to inadequate dietary iron intake    Gastroesophageal reflux disease without esophagitis    ESTHER (obstructive sleep apnea)   - was seen last week for these issues and will obtain fasting labs today.     6 mo Follow up May 2024        CHIEF COMPLAINT  Chief Complaint   Patient presents with    Weight Management     Pt would like to discuss starting on a medication to help with weight loss, she has been told about a shot.     HISTORY OF PRESENT  ILLNESS  Venecia Viera is a 63 y.o.  female here to discuss options for weight loss.     -S/P lap sleeve gastrectomy 2015 and then , then fathers illness causing her to  spiral back to eating and gained all wt back.  Does not wish to try to lose 60 lbs prior to having another surgery .   Iterensted in Zepbound.     -Anxiety and panic attacks: Sees psychiatrist, Dr Tavares Viera. Takes Ativan and Celexa and trazodone. Sees him every 6 mo and zoom q 90 days.       ROS:  Remaining reviewed and are unremarkable for other constitutional, EENT, cardiac, pulmonary, GI, , neurologic, musculoskeletal, or integumentary complaints.      PAST MEDICAL/SURGICAL, SOCIAL, &  FAMILY HISTORY:  Reviewed and updated accordingly.     ALLERGIES : Patient has no known allergies.    MEDICATIONS:  Current Outpatient

## 2024-03-28 ENCOUNTER — TELEPHONE (OUTPATIENT)
Dept: ADMINISTRATIVE | Age: 64
End: 2024-03-28

## 2024-03-28 DIAGNOSIS — E55.9 VITAMIN D DEFICIENCY: Primary | ICD-10-CM

## 2024-03-28 DIAGNOSIS — G47.33 OSA (OBSTRUCTIVE SLEEP APNEA): ICD-10-CM

## 2024-03-28 DIAGNOSIS — E66.01 CLASS 3 SEVERE OBESITY DUE TO EXCESS CALORIES WITH SERIOUS COMORBIDITY AND BODY MASS INDEX (BMI) OF 50.0 TO 59.9 IN ADULT (HCC): ICD-10-CM

## 2024-03-28 DIAGNOSIS — E78.2 MIXED HYPERLIPIDEMIA: ICD-10-CM

## 2024-03-28 DIAGNOSIS — K21.9 GASTROESOPHAGEAL REFLUX DISEASE WITHOUT ESOPHAGITIS: ICD-10-CM

## 2024-03-28 DIAGNOSIS — I10 ESSENTIAL HYPERTENSION: ICD-10-CM

## 2024-03-28 DIAGNOSIS — Z98.84 S/P LAPAROSCOPIC SLEEVE GASTRECTOMY: ICD-10-CM

## 2024-03-28 LAB
25(OH)D3 SERPL-MCNC: 6 NG/ML
ALBUMIN SERPL-MCNC: 4.2 G/DL (ref 3.4–5)
ALBUMIN/GLOB SERPL: 1.6 {RATIO} (ref 1.1–2.2)
ALP SERPL-CCNC: 152 U/L (ref 40–129)
ALT SERPL-CCNC: 15 U/L (ref 10–40)
ANION GAP SERPL CALCULATED.3IONS-SCNC: 10 MMOL/L (ref 3–16)
AST SERPL-CCNC: 19 U/L (ref 15–37)
BASOPHILS # BLD: 0 K/UL (ref 0–0.2)
BASOPHILS NFR BLD: 0.4 %
BILIRUB SERPL-MCNC: 0.6 MG/DL (ref 0–1)
BUN SERPL-MCNC: 8 MG/DL (ref 7–20)
CALCIUM SERPL-MCNC: 8.9 MG/DL (ref 8.3–10.6)
CHLORIDE SERPL-SCNC: 108 MMOL/L (ref 99–110)
CHOLEST SERPL-MCNC: 150 MG/DL (ref 0–199)
CO2 SERPL-SCNC: 24 MMOL/L (ref 21–32)
CREAT SERPL-MCNC: 0.6 MG/DL (ref 0.6–1.2)
DEPRECATED RDW RBC AUTO: 15.5 % (ref 12.4–15.4)
EOSINOPHIL # BLD: 0 K/UL (ref 0–0.6)
EOSINOPHIL NFR BLD: 0.5 %
EST. AVERAGE GLUCOSE BLD GHB EST-MCNC: 96.8 MG/DL
GFR SERPLBLD CREATININE-BSD FMLA CKD-EPI: >90 ML/MIN/{1.73_M2}
GLUCOSE SERPL-MCNC: 106 MG/DL (ref 70–99)
HBA1C MFR BLD: 5 %
HCT VFR BLD AUTO: 45 % (ref 36–48)
HDLC SERPL-MCNC: 54 MG/DL (ref 40–60)
HGB BLD-MCNC: 15.4 G/DL (ref 12–16)
LDLC SERPL CALC-MCNC: 67 MG/DL
LYMPHOCYTES # BLD: 2.2 K/UL (ref 1–5.1)
LYMPHOCYTES NFR BLD: 29.9 %
MCH RBC QN AUTO: 28.3 PG (ref 26–34)
MCHC RBC AUTO-ENTMCNC: 34.3 G/DL (ref 31–36)
MCV RBC AUTO: 82.4 FL (ref 80–100)
MONOCYTES # BLD: 0.5 K/UL (ref 0–1.3)
MONOCYTES NFR BLD: 6.5 %
NEUTROPHILS # BLD: 4.7 K/UL (ref 1.7–7.7)
NEUTROPHILS NFR BLD: 62.7 %
PLATELET # BLD AUTO: 277 K/UL (ref 135–450)
PMV BLD AUTO: 7.6 FL (ref 5–10.5)
POTASSIUM SERPL-SCNC: 3.9 MMOL/L (ref 3.5–5.1)
PROT SERPL-MCNC: 6.8 G/DL (ref 6.4–8.2)
RBC # BLD AUTO: 5.46 M/UL (ref 4–5.2)
SODIUM SERPL-SCNC: 142 MMOL/L (ref 136–145)
TRIGL SERPL-MCNC: 145 MG/DL (ref 0–150)
TSH SERPL DL<=0.005 MIU/L-ACNC: 1.35 UIU/ML (ref 0.27–4.2)
VLDLC SERPL CALC-MCNC: 29 MG/DL
WBC # BLD AUTO: 7.4 K/UL (ref 4–11)

## 2024-03-28 RX ORDER — ERGOCALCIFEROL 1.25 MG/1
50000 CAPSULE ORAL WEEKLY
Qty: 12 CAPSULE | Refills: 1 | Status: SHIPPED | OUTPATIENT
Start: 2024-03-28

## 2024-03-28 NOTE — TELEPHONE ENCOUNTER
Submitted PA for Zepbound 5MG/0.5ML pen-injectors   Via CMM Key: [id number]  STATUS: PENDING.    Follow up done daily; if no decision with in three days we will refax.  If another three days goes by with no decision will call the insurance for status.

## 2024-03-28 NOTE — TELEPHONE ENCOUNTER
Submitted PA for Zepbound 2.5MG/0.5ML pen-injectors   Via CM Key: [id number]  STATUS: PENDING.    Follow up done daily; if no decision with in three days we will refax.  If another three days goes by with no decision will call the insurance for status.

## 2024-04-03 DIAGNOSIS — E78.00 PURE HYPERCHOLESTEROLEMIA: ICD-10-CM

## 2024-04-04 RX ORDER — NIFEDIPINE 60 MG/1
TABLET, EXTENDED RELEASE ORAL
Qty: 90 TABLET | Refills: 1 | Status: SHIPPED | OUTPATIENT
Start: 2024-04-04

## 2024-04-04 RX ORDER — OMEPRAZOLE 20 MG/1
CAPSULE, DELAYED RELEASE ORAL
Qty: 90 CAPSULE | Refills: 1 | Status: SHIPPED | OUTPATIENT
Start: 2024-04-04

## 2024-04-04 RX ORDER — ATORVASTATIN CALCIUM 20 MG/1
20 TABLET, FILM COATED ORAL DAILY
Qty: 90 TABLET | Refills: 1 | Status: SHIPPED | OUTPATIENT
Start: 2024-04-04

## 2024-04-04 RX ORDER — LOSARTAN POTASSIUM 50 MG/1
TABLET ORAL
Qty: 90 TABLET | Refills: 1 | Status: SHIPPED | OUTPATIENT
Start: 2024-04-04

## 2024-04-04 NOTE — TELEPHONE ENCOUNTER
Venecia Viera is requesting refill(s) omeprazole  Last OV 3/27/24 (pertaining to medication)  LR 5/25/23 (per medication requested)  Next office visit scheduled or attempted Yes   If no, reason:  5/20/24      Venecia Viera is requesting refill(s) nefedipine  Last OV 3/27/24 (pertaining to medication)  LR 9/15/23 (per medication requested)  Next office visit scheduled or attempted Yes   If no, reason:  5/20/24      Venecia Viera is requesting refill(s) losartan  Last OV 3/27/24 (pertaining to medication)  LR 9/15/23 (per medication requested)  Next office visit scheduled or attempted Yes   If no, reason:  5/20/24      Venecia Viera is requesting refill(s) lipitor  Last OV 3/27/24 (pertaining to medication)  LR 5/25/23 (per medication requested)  Next office visit scheduled or attempted Yes   If no, reason:  5/20/24

## 2024-05-15 PROBLEM — R10.30 LOWER ABDOMINAL PAIN: Status: RESOLVED | Noted: 2019-02-18 | Resolved: 2024-05-15

## 2024-05-15 PROBLEM — D50.9 IRON DEFICIENCY ANEMIA: Status: RESOLVED | Noted: 2023-11-22 | Resolved: 2024-05-15

## 2024-05-15 NOTE — PROGRESS NOTES
[facility]  05/20/24       IMPRESSION/ PLAN  1. Essential hypertension   -Medication controlled without occurences. Monitor home BP and if remains above 140/90 contact clinic.     2. Mixed hyperlipidemia   --Medication controlled without occurences. Monitor home BP and if remains above 140/90 contact clinic.     3. Class 3 severe obesity BMI 50  -Patient completed month 1 of 2.5 mg, to start 5 mg dose of Zepbound tomorrow. Script for the 7.5 mg dose ordered today. Will need script for 10 mg in 2 months  -follow up  every 3 months to monitor.      4. Elevated glucose   Hemoglobin A1C (%)   Date Value   03/27/2024 5.0   05/25/2023 5.4   11/21/2022 5.4   -With weight loss.  Monitor     5. Vitamin D deficiency   Began weekly vitamin D a month ago.  Will remain on for 12 months..     6. Moderate episode of recurrent major depressive disorder (HCC)   Followed by psychiatrist, Dr Tavares Viera. Continue Ativan,Celexa, and trazodone. Sees him every 6 mo and zoom q 90 days      7. ESTHER (obstructive sleep apnea)   Does not tolerate CPAP     8      Acute on chronic low back pain         -Now intolerable.  Referred to back and spine to discuss injections    9. HEALTH MAINTENANCE:  DEXA ordered. Again recommended Mammogram.     Followup q6 mo and in 3 mo for weight control issues.        CHIEF COMPLAINT  Chief Complaint   Patient presents with    Hypertension     Patient is here for a 6 month follow up, she is fasting for blood work    Hyperlipidemia     HISTORY OF PRESENT  ILLNESS  Venecia Viera is a 63 y.o.  female   6 month routine follow up.    HTN- taking procardia XL  and Cozaar for years, no changes. Denies headache, syncope, vision changes, tinnitus. No chest pain, palpitations, cough, dyspnea. No new pedal edema.  HYPERLIPIDEMIA- on lipitor. No cramps or weakness.  BMI GT 50 - started on zep boound, paying out of pocked. Already feeling better, less hungry and w wt loss. To begin month 2 or 5.0

## 2024-05-17 NOTE — TELEPHONE ENCOUNTER
Venecia TRUDY Viera [phone] (home) [phone] (work)   is requesting refill(s) of medication Tirzepatide 7.5 mg to preferred pharmacy [facility]    Last OV 03/27/24 (pertaining to medication)   Last refill 04/28/24 (per medication requested)  Next office visit scheduled or attempted Yes  Date 05/20/24

## 2024-05-20 ENCOUNTER — OFFICE VISIT (OUTPATIENT)
Dept: FAMILY MEDICINE CLINIC | Age: 64
End: 2024-05-20
Payer: MEDICARE

## 2024-05-20 VITALS
OXYGEN SATURATION: 99 % | HEART RATE: 93 BPM | RESPIRATION RATE: 18 BRPM | SYSTOLIC BLOOD PRESSURE: 112 MMHG | DIASTOLIC BLOOD PRESSURE: 68 MMHG | HEIGHT: 68 IN | BODY MASS INDEX: 44.41 KG/M2 | WEIGHT: 293 LBS | TEMPERATURE: 98.1 F

## 2024-05-20 DIAGNOSIS — E66.01 CLASS 3 SEVERE OBESITY DUE TO EXCESS CALORIES WITH SERIOUS COMORBIDITY AND BODY MASS INDEX (BMI) OF 50.0 TO 59.9 IN ADULT (HCC): ICD-10-CM

## 2024-05-20 DIAGNOSIS — Z13.820 ENCOUNTER FOR OSTEOPOROSIS SCREENING IN ASYMPTOMATIC POSTMENOPAUSAL PATIENT: ICD-10-CM

## 2024-05-20 DIAGNOSIS — E55.9 VITAMIN D DEFICIENCY: ICD-10-CM

## 2024-05-20 DIAGNOSIS — M53.87 CHRONIC OR OLD STRAIN OF LUMBOSACRAL SPINE: ICD-10-CM

## 2024-05-20 DIAGNOSIS — I10 ESSENTIAL HYPERTENSION: Primary | ICD-10-CM

## 2024-05-20 DIAGNOSIS — M54.50 ACUTE BILATERAL LOW BACK PAIN WITHOUT SCIATICA: ICD-10-CM

## 2024-05-20 DIAGNOSIS — G47.33 OSA (OBSTRUCTIVE SLEEP APNEA): ICD-10-CM

## 2024-05-20 DIAGNOSIS — E78.2 MIXED HYPERLIPIDEMIA: ICD-10-CM

## 2024-05-20 DIAGNOSIS — F33.1 MODERATE EPISODE OF RECURRENT MAJOR DEPRESSIVE DISORDER (HCC): ICD-10-CM

## 2024-05-20 DIAGNOSIS — Z78.0 ENCOUNTER FOR OSTEOPOROSIS SCREENING IN ASYMPTOMATIC POSTMENOPAUSAL PATIENT: ICD-10-CM

## 2024-05-20 DIAGNOSIS — R73.09 ELEVATED GLUCOSE: ICD-10-CM

## 2024-05-20 PROCEDURE — 3074F SYST BP LT 130 MM HG: CPT | Performed by: PHYSICIAN ASSISTANT

## 2024-05-20 PROCEDURE — 3078F DIAST BP <80 MM HG: CPT | Performed by: PHYSICIAN ASSISTANT

## 2024-05-20 PROCEDURE — 1123F ACP DISCUSS/DSCN MKR DOCD: CPT | Performed by: PHYSICIAN ASSISTANT

## 2024-05-20 PROCEDURE — 99214 OFFICE O/P EST MOD 30 MIN: CPT | Performed by: PHYSICIAN ASSISTANT

## 2024-05-20 RX ORDER — TIRZEPATIDE 10 MG/.5ML
INJECTION, SOLUTION SUBCUTANEOUS WEEKLY
COMMUNITY

## 2024-05-20 NOTE — PATIENT INSTRUCTIONS
Healthy Living Recommendations 2024:  Exercise 150 minutes/ week: Aerobic and Weights Aerobic exercise strengthens muscle while weights and resistance strengthens bone. Body Mass Index (BMI) goal is 25.     Nutrition: Avoid salting foods. Limit caffeine to less than 3 cups caffeine/day. Limit carbohydrates like breads, rice, and pastas. Avoid processed and fried foods. Eat baked or broiled foods. One can never have too many vegetables and fruits which are good fiber source. Fiber lowers glucose levels. Studies show diets high in red meat and processed foods WILL increase the risk heart and liver diseases, cancers, and dementia.   Sugar : Female: Maximum sugar/ day is 6 tsp or 24 grams/ day               Male: Maximum sugar/ day is 9 tsp or 28 grams/ day  Protein:  used to protect immune system, regulate hormones,and build muscle.   High protein foods: [place], Greek yogurt, chicken, Lentils, Eggs  Calcium/ Vit D3 intake helps bones, digestion, kidneys, and mental health.   High calcium foods:  milk, yogurt, cheese, beans, dark green leafy vegetables.   High Vitamin D foods:  salmon, tuna fish, fortified cereals, mushrooms.  Tobacco: Avoid all smoking     Eye exam every 2 years after age 40.   Dental; Brush twice a day. Floss daily. Dentist exam every 6 months.  Noise: recurrent loud noise WILL result in hearing loss.   Earbud use: keep volume below 50%. Just 5 minutes at 100% volume will result in permanent hearing loss.   Colonoscopy Begin at age 45.    Skin: examine skin monthly for changes.  Breasts: Perform monthly self-breast exam for changes. FM: Yearly mammograms begin age 40. Repeat yearly.  Males   perform monthly self-testicular exam for changes.  Urinary changes can be a sign of prostate issues.         [facility]   [address] [facility], [address], [place] [address]  Office hours: Monday - Friday 7 am- 5 pm. Phone lines turn on at 8 am.   Office PH: [phone], FAX [phone]

## 2024-08-19 NOTE — PROGRESS NOTES
12 capsule 1    traZODone (DESYREL) 150 MG tablet Take 1 tablet by mouth at bedtime      LORazepam (ATIVAN) 2 MG tablet Take 1 tablet by mouth daily.      citalopram (CELEXA) 40 MG tablet Take 1 tablet by mouth nightly       No current facility-administered medications on file prior to visit.        PHYSICAL EXAM     Vitals:    08/21/24 1526   BP: 122/74   Pulse: 75   Resp: 18   Temp: 98 °F (36.7 °C)   TempSrc: Temporal   SpO2: 94%   Weight: (!) 147.1 kg (324 lb 6.4 oz)   Height: 1.727 m (5' 8\")   Body mass index is 49.32 kg/m².  APPEARANCE: Well nourished. No distress.   HEENT: Head: Normocephalic, atraumatic.  EOMI,PERRLA. Conjunctiva pink and moist. Sclera white. Hearing intact.  Oral mucosa moist.NECK:Moves neck fully.  HEART: Reg rate and rhythm. No murmurs, rubs, or gallops.   LUNGS: Clear to auscultation. No wheezes, rales, or rhonchi.  ABDOMEN:  Soft,  MUSCULOSKELETAL:  No clubbing, cyanosis or edema.  Moves all joints without eliciting pain.  NEUROLOGIC: Grossly non focal.   SKIN: Warm, dry, normal turgor. Cap refill <3secs.  No rashes, petechiae, purpura.   PSYCHIATRIC:   Mood. Behavior, and judgement normal. Thought content normal.      ADDITIONAL STUDIES     Pertinent prior laboratory results and/or imaging reviewed.   Orders Placed This Encounter   Procedures    POCT glycosylated hemoglobin (Hb A1C)       Electronically signed by MATILDE King on 8/21/2024 at 4:00 PM

## 2024-08-21 ENCOUNTER — OFFICE VISIT (OUTPATIENT)
Dept: FAMILY MEDICINE CLINIC | Age: 64
End: 2024-08-21
Payer: MEDICARE

## 2024-08-21 VITALS
SYSTOLIC BLOOD PRESSURE: 122 MMHG | HEART RATE: 75 BPM | RESPIRATION RATE: 18 BRPM | DIASTOLIC BLOOD PRESSURE: 74 MMHG | WEIGHT: 293 LBS | TEMPERATURE: 98 F | HEIGHT: 68 IN | OXYGEN SATURATION: 94 % | BODY MASS INDEX: 44.41 KG/M2

## 2024-08-21 DIAGNOSIS — E66.01 CLASS 3 SEVERE OBESITY DUE TO EXCESS CALORIES WITH SERIOUS COMORBIDITY AND BODY MASS INDEX (BMI) OF 50.0 TO 59.9 IN ADULT (HCC): Primary | ICD-10-CM

## 2024-08-21 DIAGNOSIS — R73.01 ELEVATED FASTING GLUCOSE: ICD-10-CM

## 2024-08-21 DIAGNOSIS — Z98.84 S/P LAPAROSCOPIC SLEEVE GASTRECTOMY: ICD-10-CM

## 2024-08-21 DIAGNOSIS — I10 ESSENTIAL HYPERTENSION: ICD-10-CM

## 2024-08-21 DIAGNOSIS — E78.2 MIXED HYPERLIPIDEMIA: ICD-10-CM

## 2024-08-21 PROBLEM — M79.672 LEFT FOOT PAIN: Status: RESOLVED | Noted: 2018-08-28 | Resolved: 2024-08-21

## 2024-08-21 LAB — HBA1C MFR BLD: 5.2 %

## 2024-08-21 PROCEDURE — 1123F ACP DISCUSS/DSCN MKR DOCD: CPT | Performed by: PHYSICIAN ASSISTANT

## 2024-08-21 PROCEDURE — 3078F DIAST BP <80 MM HG: CPT | Performed by: PHYSICIAN ASSISTANT

## 2024-08-21 PROCEDURE — 3074F SYST BP LT 130 MM HG: CPT | Performed by: PHYSICIAN ASSISTANT

## 2024-08-21 PROCEDURE — 83036 HEMOGLOBIN GLYCOSYLATED A1C: CPT | Performed by: PHYSICIAN ASSISTANT

## 2024-08-21 PROCEDURE — G2211 COMPLEX E/M VISIT ADD ON: HCPCS | Performed by: PHYSICIAN ASSISTANT

## 2024-08-21 PROCEDURE — 99214 OFFICE O/P EST MOD 30 MIN: CPT | Performed by: PHYSICIAN ASSISTANT

## 2024-08-27 DIAGNOSIS — I10 ESSENTIAL HYPERTENSION: ICD-10-CM

## 2024-08-27 DIAGNOSIS — E78.2 MIXED HYPERLIPIDEMIA: ICD-10-CM

## 2024-08-27 DIAGNOSIS — Z98.84 S/P LAPAROSCOPIC SLEEVE GASTRECTOMY: ICD-10-CM

## 2024-08-27 DIAGNOSIS — R73.01 ELEVATED FASTING GLUCOSE: ICD-10-CM

## 2024-08-27 DIAGNOSIS — E66.01 CLASS 3 SEVERE OBESITY DUE TO EXCESS CALORIES WITH SERIOUS COMORBIDITY AND BODY MASS INDEX (BMI) OF 50.0 TO 59.9 IN ADULT (HCC): ICD-10-CM

## 2024-08-27 NOTE — TELEPHONE ENCOUNTER
Compounded Mounjaro prescriptions sent on 08/21/24 was sent to [facility] pharmacy instead of [facility]. Pended prescriptions so it can be sent to [facility] pharmacy.    Patient verbalized understanding.

## 2024-10-15 DIAGNOSIS — E78.00 PURE HYPERCHOLESTEROLEMIA: ICD-10-CM

## 2024-10-16 RX ORDER — LOSARTAN POTASSIUM 50 MG/1
TABLET ORAL
Qty: 90 TABLET | Refills: 1 | Status: SHIPPED | OUTPATIENT
Start: 2024-10-16

## 2024-10-16 RX ORDER — NIFEDIPINE 60 MG/1
TABLET, EXTENDED RELEASE ORAL
Qty: 90 TABLET | Refills: 1 | Status: SHIPPED | OUTPATIENT
Start: 2024-10-16

## 2024-10-16 RX ORDER — ATORVASTATIN CALCIUM 20 MG/1
20 TABLET, FILM COATED ORAL DAILY
Qty: 90 TABLET | Refills: 1 | Status: SHIPPED | OUTPATIENT
Start: 2024-10-16

## 2024-10-16 NOTE — TELEPHONE ENCOUNTER
Venecia Viera is requesting refill(s) atorvastatin, losartan, nifedipine, omeprazole  Last OV 8/21/24 (pertaining to medication)  LR 4/4/24 (per medication requested)  Next office visit scheduled or attempted Yes   If no, reason:  11/21/24

## 2024-11-21 ENCOUNTER — OFFICE VISIT (OUTPATIENT)
Dept: FAMILY MEDICINE CLINIC | Age: 64
End: 2024-11-21

## 2024-11-21 VITALS
BODY MASS INDEX: 44.41 KG/M2 | OXYGEN SATURATION: 98 % | HEIGHT: 68 IN | HEART RATE: 83 BPM | TEMPERATURE: 98.3 F | SYSTOLIC BLOOD PRESSURE: 128 MMHG | RESPIRATION RATE: 18 BRPM | WEIGHT: 293 LBS | DIASTOLIC BLOOD PRESSURE: 78 MMHG

## 2024-11-21 DIAGNOSIS — Z98.890 POSTOPERATIVE HYPOXIA: ICD-10-CM

## 2024-11-21 DIAGNOSIS — E66.01 CLASS 3 SEVERE OBESITY DUE TO EXCESS CALORIES WITH SERIOUS COMORBIDITY AND BODY MASS INDEX (BMI) OF 50.0 TO 59.9 IN ADULT: Primary | ICD-10-CM

## 2024-11-21 DIAGNOSIS — I10 ESSENTIAL HYPERTENSION: ICD-10-CM

## 2024-11-21 DIAGNOSIS — E66.813 CLASS 3 SEVERE OBESITY DUE TO EXCESS CALORIES WITH SERIOUS COMORBIDITY AND BODY MASS INDEX (BMI) OF 50.0 TO 59.9 IN ADULT: Primary | ICD-10-CM

## 2024-11-21 DIAGNOSIS — J96.12 CHRONIC HYPERCAPNIC RESPIRATORY FAILURE: ICD-10-CM

## 2024-11-21 DIAGNOSIS — Z98.84 S/P LAPAROSCOPIC SLEEVE GASTRECTOMY: ICD-10-CM

## 2024-11-21 DIAGNOSIS — E66.2 OBESITY HYPOVENTILATION SYNDROME: ICD-10-CM

## 2024-11-21 DIAGNOSIS — R09.02 POSTOPERATIVE HYPOXIA: ICD-10-CM

## 2024-11-21 DIAGNOSIS — R73.01 ELEVATED FASTING GLUCOSE: ICD-10-CM

## 2024-11-21 DIAGNOSIS — Z23 NEED FOR INFLUENZA VACCINATION: ICD-10-CM

## 2024-11-21 DIAGNOSIS — K58.0 IRRITABLE BOWEL SYNDROME WITH DIARRHEA: ICD-10-CM

## 2024-11-21 LAB — HBA1C MFR BLD: 5.1 %

## 2024-11-21 RX ORDER — TIRZEPATIDE 2.5 MG/.5ML
2.5 INJECTION, SOLUTION SUBCUTANEOUS WEEKLY
Qty: 0.5 ML | Refills: 0 | Status: SHIPPED | COMMUNITY
Start: 2024-11-21

## 2024-11-21 NOTE — PROGRESS NOTES
Vaccine Information Sheet, \"Influenza - Inactivated\"  given to Venecia Viera, or parent/legal guardian of  Venecia Viera and verbalized understanding.    Patient responses:    Have you ever had a reaction to a flu vaccine? No  Do you have any current illness?  No  Have you ever had Guillian [place] Syndrome?  No  Do you have a serious allergy to any of the follow: Neomycin, Polymyxin, Thimerosal, eggs or egg products? No    Flu vaccine given per order. Please see immunization tab.    Risks and benefits explained.  Current VIS given.          
intact. Nares patent. Oral mucosa moist. Throat clear.  NECK: No lymphadenopathy or masses. Thyroid is smooth. Moves neck fully.  HEART: Reg rate and rhythm. No murmurs, rubs, or gallops.   LUNGS: Clear to auscultation. No wheezes, rales, or rhonchi.  ABDOMEN: morbid obesity  Soft, hyperacitve bowel sounds present, non-tender, no masses or organomegaly.  MUSCULOSKELETAL:  No clubbing, cyanosis or edema.  Moves all joints without eliciting pain.  NEUROLOGIC: Grossly non focal.   SKIN: Warm, dry, normal turgor. Cap refill <3secs.  No rashes, petechiae, purpura.   PSYCHIATRIC:   Mood. Behavior, and judgement normal. Thought content normal.      ADDITIONAL STUDIES     Pertinent prior laboratory results and/or imaging reviewed.   Orders Placed This Encounter   Procedures    Influenza, FLUAD Trivalent, (age 65 y+), IM, Preservative Free, 0.5mL    Comprehensive Metabolic Panel    Lipid Panel    CBC with Auto Differential    TSH with Reflex to FT4    POCT glycosylated hemoglobin (Hb A1C)       Electronically signed by MATILDE King on 11/21/2024 at 4:17 PM

## 2024-11-22 LAB
ALBUMIN SERPL-MCNC: 4.2 G/DL (ref 3.4–5)
ALBUMIN/GLOB SERPL: 1.7 {RATIO} (ref 1.1–2.2)
ALP SERPL-CCNC: 149 U/L (ref 40–129)
ALT SERPL-CCNC: 14 U/L (ref 10–40)
ANION GAP SERPL CALCULATED.3IONS-SCNC: 13 MMOL/L (ref 3–16)
AST SERPL-CCNC: 20 U/L (ref 15–37)
BASOPHILS # BLD: 0.1 K/UL (ref 0–0.2)
BASOPHILS NFR BLD: 1 %
BILIRUB SERPL-MCNC: 0.6 MG/DL (ref 0–1)
BUN SERPL-MCNC: 9 MG/DL (ref 7–20)
CALCIUM SERPL-MCNC: 9.1 MG/DL (ref 8.3–10.6)
CHLORIDE SERPL-SCNC: 106 MMOL/L (ref 99–110)
CHOLEST SERPL-MCNC: 146 MG/DL (ref 0–199)
CO2 SERPL-SCNC: 24 MMOL/L (ref 21–32)
CREAT SERPL-MCNC: 0.8 MG/DL (ref 0.6–1.2)
DEPRECATED RDW RBC AUTO: 16.5 % (ref 12.4–15.4)
EOSINOPHIL # BLD: 0 K/UL (ref 0–0.6)
EOSINOPHIL NFR BLD: 0.3 %
GFR SERPLBLD CREATININE-BSD FMLA CKD-EPI: 81 ML/MIN/{1.73_M2}
GLUCOSE SERPL-MCNC: 96 MG/DL (ref 70–99)
HCT VFR BLD AUTO: 46.6 % (ref 36–48)
HDLC SERPL-MCNC: 53 MG/DL (ref 40–60)
HGB BLD-MCNC: 15.2 G/DL (ref 12–16)
LDLC SERPL CALC-MCNC: 69 MG/DL
LYMPHOCYTES # BLD: 1.7 K/UL (ref 1–5.1)
LYMPHOCYTES NFR BLD: 23.8 %
MCH RBC QN AUTO: 27.5 PG (ref 26–34)
MCHC RBC AUTO-ENTMCNC: 32.6 G/DL (ref 31–36)
MCV RBC AUTO: 84.5 FL (ref 80–100)
MONOCYTES # BLD: 0.4 K/UL (ref 0–1.3)
MONOCYTES NFR BLD: 6.2 %
NEUTROPHILS # BLD: 4.9 K/UL (ref 1.7–7.7)
NEUTROPHILS NFR BLD: 68.7 %
PLATELET # BLD AUTO: 270 K/UL (ref 135–450)
PMV BLD AUTO: 8.2 FL (ref 5–10.5)
POTASSIUM SERPL-SCNC: 4.4 MMOL/L (ref 3.5–5.1)
PROT SERPL-MCNC: 6.7 G/DL (ref 6.4–8.2)
RBC # BLD AUTO: 5.51 M/UL (ref 4–5.2)
SODIUM SERPL-SCNC: 143 MMOL/L (ref 136–145)
TRIGL SERPL-MCNC: 121 MG/DL (ref 0–150)
TSH SERPL DL<=0.005 MIU/L-ACNC: 1.91 UIU/ML (ref 0.27–4.2)
VLDLC SERPL CALC-MCNC: 24 MG/DL
WBC # BLD AUTO: 7.2 K/UL (ref 4–11)

## 2025-01-06 RX ORDER — TIRZEPATIDE 5 MG/.5ML
0.5 INJECTION, SOLUTION SUBCUTANEOUS
Qty: 2 ML | Refills: 0 | Status: SHIPPED | OUTPATIENT
Start: 2025-01-06 | End: 2025-01-28

## 2025-01-07 ENCOUNTER — TELEPHONE (OUTPATIENT)
Dept: ADMINISTRATIVE | Age: 65
End: 2025-01-07

## 2025-01-07 NOTE — TELEPHONE ENCOUNTER
Submitted PA for Mounjaro 5MG/0.5ML auto-injectors   Via CM (Key: [id number]) STATUS: PENDING.    Follow up done daily; if no decision with in three days we will refax.  If another three days goes by with no decision will call the insurance for status.

## 2025-01-08 NOTE — TELEPHONE ENCOUNTER
The medication was DENIED; DENIAL letter is uploaded to MEDIA.    Generic Denial: Drug is not covered by the plan ( Plan Exclusion)  and  Other; please see Denial Letter.           Note :  Drugs when used for anorexia, weight loss, or weight gain are excluded from coverage under Medicare rules. Please refer to your Evidence of Coverage (EOC) section that references Part D drug coverage in your pharmacy plan documents for more information.      If you want an APPEAL; please note in this encounter what new information you would like to APPEAL with.  Once complete route back to PA POOL.    If this requires a response please respond to the pool ( P [facility] MEDICATION PRE-AUTH).      Thank you please advise patient.

## 2025-02-05 NOTE — TELEPHONE ENCOUNTER
Pt wants to move up in Mounjaro dose to 7.5 mg.  Venecia Viera [phone] (home) [phone] (work)   is requesting refill(s) of medication Mounjaro 7.5 mg/0.5 mL to preferred pharmacy [facility]    Last OV 11/21/24 (pertaining to medication)   Last refill 01/28/25 (per medication requested)  Next office visit scheduled or attempted Yes  Date 05/21/25

## 2025-02-25 ENCOUNTER — TELEPHONE (OUTPATIENT)
Dept: FAMILY MEDICINE CLINIC | Age: 65
End: 2025-02-25

## 2025-03-11 NOTE — TELEPHONE ENCOUNTER
Pt will move up to 10 mg injection x1 mo, then 12.5mg x 1 mo, then 15mg thereafter. Rx'd for all 3.

## 2025-03-11 NOTE — TELEPHONE ENCOUNTER
Venecia Viera is requesting refill(s) mounjaro  Last OV 11/21/24 (pertaining to medication)  LR 2/5/25 (per medication requested)  Next office visit scheduled or attempted Yes   If no, reason:  5/21/25

## 2025-04-09 SDOH — HEALTH STABILITY: PHYSICAL HEALTH: ON AVERAGE, HOW MANY DAYS PER WEEK DO YOU ENGAGE IN MODERATE TO STRENUOUS EXERCISE (LIKE A BRISK WALK)?: 0 DAYS

## 2025-04-09 SDOH — HEALTH STABILITY: PHYSICAL HEALTH: ON AVERAGE, HOW MANY MINUTES DO YOU ENGAGE IN EXERCISE AT THIS LEVEL?: 0 MIN

## 2025-04-09 ASSESSMENT — PATIENT HEALTH QUESTIONNAIRE - PHQ9
SUM OF ALL RESPONSES TO PHQ QUESTIONS 1-9: 2
1. LITTLE INTEREST OR PLEASURE IN DOING THINGS: SEVERAL DAYS
SUM OF ALL RESPONSES TO PHQ QUESTIONS 1-9: 2
2. FEELING DOWN, DEPRESSED OR HOPELESS: SEVERAL DAYS

## 2025-04-09 ASSESSMENT — LIFESTYLE VARIABLES
HOW MANY STANDARD DRINKS CONTAINING ALCOHOL DO YOU HAVE ON A TYPICAL DAY: PATIENT DOES NOT DRINK
HOW OFTEN DO YOU HAVE A DRINK CONTAINING ALCOHOL: NEVER

## 2025-04-16 SDOH — ECONOMIC STABILITY: INCOME INSECURITY: IN THE LAST 12 MONTHS, WAS THERE A TIME WHEN YOU WERE NOT ABLE TO PAY THE MORTGAGE OR RENT ON TIME?: NO

## 2025-04-16 SDOH — ECONOMIC STABILITY: FOOD INSECURITY: WITHIN THE PAST 12 MONTHS, THE FOOD YOU BOUGHT JUST DIDN'T LAST AND YOU DIDN'T HAVE MONEY TO GET MORE.: NEVER TRUE

## 2025-04-16 SDOH — HEALTH STABILITY: PHYSICAL HEALTH: ON AVERAGE, HOW MANY DAYS PER WEEK DO YOU ENGAGE IN MODERATE TO STRENUOUS EXERCISE (LIKE A BRISK WALK)?: 0 DAYS

## 2025-04-16 SDOH — ECONOMIC STABILITY: FOOD INSECURITY: WITHIN THE PAST 12 MONTHS, YOU WORRIED THAT YOUR FOOD WOULD RUN OUT BEFORE YOU GOT MONEY TO BUY MORE.: NEVER TRUE

## 2025-04-16 SDOH — HEALTH STABILITY: PHYSICAL HEALTH: ON AVERAGE, HOW MANY MINUTES DO YOU ENGAGE IN EXERCISE AT THIS LEVEL?: 0 MIN

## 2025-04-16 SDOH — ECONOMIC STABILITY: TRANSPORTATION INSECURITY
IN THE PAST 12 MONTHS, HAS THE LACK OF TRANSPORTATION KEPT YOU FROM MEDICAL APPOINTMENTS OR FROM GETTING MEDICATIONS?: NO

## 2025-04-16 SDOH — ECONOMIC STABILITY: TRANSPORTATION INSECURITY
IN THE PAST 12 MONTHS, HAS LACK OF TRANSPORTATION KEPT YOU FROM MEETINGS, WORK, OR FROM GETTING THINGS NEEDED FOR DAILY LIVING?: NO

## 2025-04-16 ASSESSMENT — PATIENT HEALTH QUESTIONNAIRE - PHQ9
SUM OF ALL RESPONSES TO PHQ QUESTIONS 1-9: 12
8. MOVING OR SPEAKING SO SLOWLY THAT OTHER PEOPLE COULD HAVE NOTICED. OR THE OPPOSITE, BEING SO FIGETY OR RESTLESS THAT YOU HAVE BEEN MOVING AROUND A LOT MORE THAN USUAL: NOT AT ALL
6. FEELING BAD ABOUT YOURSELF - OR THAT YOU ARE A FAILURE OR HAVE LET YOURSELF OR YOUR FAMILY DOWN: NEARLY EVERY DAY
5. POOR APPETITE OR OVEREATING: NOT AT ALL
2. FEELING DOWN, DEPRESSED OR HOPELESS: MORE THAN HALF THE DAYS
10. IF YOU CHECKED OFF ANY PROBLEMS, HOW DIFFICULT HAVE THESE PROBLEMS MADE IT FOR YOU TO DO YOUR WORK, TAKE CARE OF THINGS AT HOME, OR GET ALONG WITH OTHER PEOPLE: EXTREMELY DIFFICULT
1. LITTLE INTEREST OR PLEASURE IN DOING THINGS: SEVERAL DAYS
SUM OF ALL RESPONSES TO PHQ QUESTIONS 1-9: 12
SUM OF ALL RESPONSES TO PHQ QUESTIONS 1-9: 12
7. TROUBLE CONCENTRATING ON THINGS, SUCH AS READING THE NEWSPAPER OR WATCHING TELEVISION: NOT AT ALL
SUM OF ALL RESPONSES TO PHQ QUESTIONS 1-9: 12
4. FEELING TIRED OR HAVING LITTLE ENERGY: NEARLY EVERY DAY
9. THOUGHTS THAT YOU WOULD BE BETTER OFF DEAD, OR OF HURTING YOURSELF: NOT AT ALL
3. TROUBLE FALLING OR STAYING ASLEEP: NEARLY EVERY DAY

## 2025-04-16 ASSESSMENT — LIFESTYLE VARIABLES
HOW OFTEN DO YOU HAVE A DRINK CONTAINING ALCOHOL: NEVER
HOW MANY STANDARD DRINKS CONTAINING ALCOHOL DO YOU HAVE ON A TYPICAL DAY: PATIENT DOES NOT DRINK
HOW OFTEN DO YOU HAVE SIX OR MORE DRINKS ON ONE OCCASION: 1
HOW OFTEN DO YOU HAVE A DRINK CONTAINING ALCOHOL: 1
HOW MANY STANDARD DRINKS CONTAINING ALCOHOL DO YOU HAVE ON A TYPICAL DAY: 0

## 2025-04-17 ENCOUNTER — TELEMEDICINE (OUTPATIENT)
Dept: FAMILY MEDICINE CLINIC | Age: 65
End: 2025-04-17
Payer: MEDICARE

## 2025-04-17 DIAGNOSIS — Z00.00 INITIAL MEDICARE ANNUAL WELLNESS VISIT: Primary | ICD-10-CM

## 2025-04-17 PROCEDURE — G0438 PPPS, INITIAL VISIT: HCPCS | Performed by: PHYSICIAN ASSISTANT

## 2025-04-17 PROCEDURE — 1159F MED LIST DOCD IN RCRD: CPT | Performed by: PHYSICIAN ASSISTANT

## 2025-04-17 PROCEDURE — 1123F ACP DISCUSS/DSCN MKR DOCD: CPT | Performed by: PHYSICIAN ASSISTANT

## 2025-04-17 RX ORDER — QUETIAPINE FUMARATE 50 MG/1
50 TABLET, FILM COATED ORAL EVERY OTHER DAY
COMMUNITY
Start: 2025-03-04

## 2025-04-17 NOTE — PROGRESS NOTES
Medicare Annual Wellness Visit    Venecia Viera is here for Medicare AWV (Patient is completing a Medicare AWV)    Assessment & Plan   Initial Medicare annual wellness visit    -Does not want any vaccines. Defers preventive care but will get Lo dose CT lung.   -Will think about going to dentist.  -Safety discussed.     Subjective       Patient's complete Health Risk Assessment and screening values have been reviewed and are found in Flowsheets. The following problems were reviewed today and where indicated follow up appointments were made and/or referrals ordered.    Positive Risk Factor Screenings with Interventions:    Fall Risk:  Do you feel unsteady or are you worried about falling? : (!) (Patient-Rptd) yes  2 or more falls in past year?: (!) (Patient-Rptd) yes  Fall with injury in past year?: (Patient-Rptd) no     Interventions:    Reviewed medications, home hazards, visual acuity, and co-morbidities that can increase risk for falls     Depression:  PHQ-2 Score: (Patient-Rptd) 3  PHQ-9 Total Score: 12  Total Score Interpretation: 10-14 = moderate depression  Interventions:  Medications adjusted: By psychiatrist           Inactivity:  On average, how many days per week do you engage in moderate to strenuous exercise (like a brisk walk)?: (Patient-Rptd) 0 days (!) Abnormal  On average, how many minutes do you engage in exercise at this level?: (Patient-Rptd) 0 min  Interventions:  Patient comments: Improved with weight    Poor Eating Habits/Diet:  Do you eat balanced/healthy meals regularly?: (!) (Patient-Rptd) No  Interventions:  Patient comments: doing really well on GLP1    Abnormal BMI (obese):  There is no height or weight on file to calculate BMI. (!) Abnormal  Interventions:  Patient comments: Improved with GLP-1        Dentist Screen:  Have you seen the dentist within the past year?: (!) (Patient-Rptd) No    Intervention:  Patient comments: will think about it     Vision Screen:  Do you have difficulty

## 2025-04-17 NOTE — PATIENT INSTRUCTIONS
or liability for your use of this information.         Advance Directives: Care Instructions  Overview  An advance directive is a legal way to state your wishes at the end of your life. It tells your family and your doctor what to do if you can't say what you want.  There are two main types of advance directives. You can change them any time your wishes change.  Living will.  This form tells your family and your doctor your wishes about life support and other treatment. The form is also called a declaration.  Medical power of .  This form lets you name a person to make treatment decisions for you when you can't speak for yourself. This person is called a health care agent (health care proxy, health care surrogate). The form is also called a durable power of  for health care.  If you do not have an advance directive, decisions about your medical care may be made by a family member, or by a doctor or a  who doesn't know you.  It may help to think of an advance directive as a gift to the people who care for you. If you have one, they won't have to make tough decisions by themselves.  For more information, including forms for your state, see the [employer] website (www.caringinfo.org/planning/advance-directives/).  Follow-up care is a key part of your treatment and safety. Be sure to make and go to all appointments, and call your doctor if you are having problems. It's also a good idea to know your test results and keep a list of the medicines you take.  What should you include in an advance directive?  Many states have a unique advance directive form. (It may ask you to address specific issues.) Or you might use a universal form that's approved by many states.  If your form doesn't tell you what to address, it may be hard to know what to include in your advance directive. Use the questions below to help you get started.  Who do you want to make decisions about your medical care if you are not able

## 2025-04-18 DIAGNOSIS — E78.00 PURE HYPERCHOLESTEROLEMIA: ICD-10-CM

## 2025-04-21 RX ORDER — NIFEDIPINE 60 MG/1
60 TABLET, EXTENDED RELEASE ORAL NIGHTLY
Qty: 90 TABLET | Refills: 1 | Status: SHIPPED | OUTPATIENT
Start: 2025-04-21

## 2025-04-21 RX ORDER — LOSARTAN POTASSIUM 50 MG/1
50 TABLET ORAL NIGHTLY
Qty: 90 TABLET | Refills: 1 | Status: SHIPPED | OUTPATIENT
Start: 2025-04-21

## 2025-04-21 RX ORDER — ATORVASTATIN CALCIUM 20 MG/1
20 TABLET, FILM COATED ORAL DAILY
Qty: 90 TABLET | Refills: 1 | Status: SHIPPED | OUTPATIENT
Start: 2025-04-21

## 2025-04-21 RX ORDER — OMEPRAZOLE 20 MG/1
20 CAPSULE, DELAYED RELEASE ORAL
Qty: 90 CAPSULE | Refills: 1 | Status: SHIPPED | OUTPATIENT
Start: 2025-04-21

## 2025-04-21 NOTE — TELEPHONE ENCOUNTER
Venecia Viera is requesting refill(s) nifedipine, losartan, lipitor  Last OV 11/21/24 (pertaining to medication)  LR 10/16/24 (per medication requested)  Next office visit scheduled or attempted Yes   If no, reason:  5/21/25

## 2025-05-19 NOTE — PROGRESS NOTES
mo and zoom q 90 days. Wants psychologist.  Chronic thoracic and lumbar back pain-this limits her exercise ability.  Tylenol does not help.  Denies bowel or bladder changes or radiculopathy.    ROS:  Remaining 14 ROS were reviewed and are unremarkable for other constitutional, EENT, cardiac, pulmonary, GI, , neurologic, musculoskeletal, or integumentary complaints.      PAST MEDICAL/SURGICAL, SOCIAL, &  FAMILY HISTORY:  Reviewed and updated accordingly.     ALLERGIES : Patient has no known allergies.    MEDICATIONS:  Current Outpatient Medications on File Prior to Visit   Medication Sig Dispense Refill    atorvastatin (LIPITOR) 20 MG tablet TAKE 1 TABLET BY MOUTH DAILY 90 tablet 1    losartan (COZAAR) 50 MG tablet TAKE ONE TABLET BY MOUTH ONCE NIGHTLY 90 tablet 1    NIFEdipine (PROCARDIA XL) 60 MG extended release tablet TAKE ONE TABLET BY MOUTH ONCE NIGHTLY 90 tablet 1    omeprazole (PRILOSEC) 20 MG delayed release capsule TAKE ONE CAPSULE BY MOUTH EVERY MORNING BEFORE BREAKFAST 90 capsule 1    QUEtiapine (SEROQUEL) 50 MG tablet Take 1 tablet by mouth every other day      tirzepatide-weight management (ZEPBOUND) 2.5 MG/0.5ML SOLN subCUTAneous injection (VIAL) Inject 2 mLs into the skin once a week for 30 days, THEN 2.5 mLs once a week for 30 days, THEN 3 mLs once a week. 30 mL 0    traZODone (DESYREL) 150 MG tablet Take 1 tablet by mouth at bedtime      LORazepam (ATIVAN) 2 MG tablet Take 1 tablet by mouth daily.      citalopram (CELEXA) 40 MG tablet Take 1 tablet by mouth nightly       No current facility-administered medications on file prior to visit.        PHYSICAL EXAM     Vitals:    05/21/25 1517   BP: 118/62   Pulse: 89   Resp: 18   Temp: 98 °F (36.7 °C)   TempSrc: Temporal   SpO2: 96%   Weight: 132.4 kg (291 lb 12.8 oz)   Height: 1.727 m (5' 8\")   Body mass index is 44.37 kg/m².  APPEARANCE: Well nourished. No distress.   HEENT: Head: Normocephalic, atraumatic.  EOMI,PERRLA. Conjunctiva pink and moist.

## 2025-05-21 ENCOUNTER — OFFICE VISIT (OUTPATIENT)
Dept: FAMILY MEDICINE CLINIC | Age: 65
End: 2025-05-21
Payer: MEDICARE

## 2025-05-21 VITALS
HEIGHT: 68 IN | SYSTOLIC BLOOD PRESSURE: 118 MMHG | WEIGHT: 291.8 LBS | TEMPERATURE: 98 F | RESPIRATION RATE: 18 BRPM | HEART RATE: 89 BPM | BODY MASS INDEX: 44.22 KG/M2 | DIASTOLIC BLOOD PRESSURE: 62 MMHG | OXYGEN SATURATION: 96 %

## 2025-05-21 DIAGNOSIS — F33.1 MODERATE EPISODE OF RECURRENT MAJOR DEPRESSIVE DISORDER (HCC): ICD-10-CM

## 2025-05-21 DIAGNOSIS — G89.29 OTHER CHRONIC BACK PAIN: ICD-10-CM

## 2025-05-21 DIAGNOSIS — E78.2 MIXED HYPERLIPIDEMIA: ICD-10-CM

## 2025-05-21 DIAGNOSIS — E66.813 CLASS 3 SEVERE OBESITY DUE TO EXCESS CALORIES WITH SERIOUS COMORBIDITY AND BODY MASS INDEX (BMI) OF 50.0 TO 59.9 IN ADULT (HCC): ICD-10-CM

## 2025-05-21 DIAGNOSIS — I10 ESSENTIAL HYPERTENSION: ICD-10-CM

## 2025-05-21 DIAGNOSIS — R73.01 ELEVATED FASTING GLUCOSE: Primary | ICD-10-CM

## 2025-05-21 DIAGNOSIS — Z78.0 ENCOUNTER FOR OSTEOPOROSIS SCREENING IN ASYMPTOMATIC POSTMENOPAUSAL PATIENT: ICD-10-CM

## 2025-05-21 DIAGNOSIS — Z13.820 ENCOUNTER FOR OSTEOPOROSIS SCREENING IN ASYMPTOMATIC POSTMENOPAUSAL PATIENT: ICD-10-CM

## 2025-05-21 DIAGNOSIS — M54.89 OTHER CHRONIC BACK PAIN: ICD-10-CM

## 2025-05-21 DIAGNOSIS — Z98.84 S/P LAPAROSCOPIC SLEEVE GASTRECTOMY: ICD-10-CM

## 2025-05-21 LAB — HBA1C MFR BLD: 4.9 %

## 2025-05-21 PROCEDURE — 1123F ACP DISCUSS/DSCN MKR DOCD: CPT | Performed by: PHYSICIAN ASSISTANT

## 2025-05-21 PROCEDURE — 1160F RVW MEDS BY RX/DR IN RCRD: CPT | Performed by: PHYSICIAN ASSISTANT

## 2025-05-21 PROCEDURE — 83036 HEMOGLOBIN GLYCOSYLATED A1C: CPT | Performed by: PHYSICIAN ASSISTANT

## 2025-05-21 PROCEDURE — G2211 COMPLEX E/M VISIT ADD ON: HCPCS | Performed by: PHYSICIAN ASSISTANT

## 2025-05-21 PROCEDURE — 36415 COLL VENOUS BLD VENIPUNCTURE: CPT | Performed by: PHYSICIAN ASSISTANT

## 2025-05-21 PROCEDURE — 99214 OFFICE O/P EST MOD 30 MIN: CPT | Performed by: PHYSICIAN ASSISTANT

## 2025-05-21 PROCEDURE — 3074F SYST BP LT 130 MM HG: CPT | Performed by: PHYSICIAN ASSISTANT

## 2025-05-21 PROCEDURE — 1159F MED LIST DOCD IN RCRD: CPT | Performed by: PHYSICIAN ASSISTANT

## 2025-05-21 PROCEDURE — 3078F DIAST BP <80 MM HG: CPT | Performed by: PHYSICIAN ASSISTANT

## 2025-05-22 ENCOUNTER — RESULTS FOLLOW-UP (OUTPATIENT)
Dept: FAMILY MEDICINE CLINIC | Age: 65
End: 2025-05-22

## 2025-05-22 LAB
ALBUMIN SERPL-MCNC: 3.8 G/DL (ref 3.4–5)
ALBUMIN/GLOB SERPL: 1.5 {RATIO} (ref 1.1–2.2)
ALP SERPL-CCNC: 132 U/L (ref 40–129)
ALT SERPL-CCNC: 14 U/L (ref 10–40)
ANION GAP SERPL CALCULATED.3IONS-SCNC: 13 MMOL/L (ref 3–16)
AST SERPL-CCNC: 22 U/L (ref 15–37)
BILIRUB SERPL-MCNC: 1 MG/DL (ref 0–1)
BUN SERPL-MCNC: 5 MG/DL (ref 7–20)
CALCIUM SERPL-MCNC: 8.7 MG/DL (ref 8.3–10.6)
CHLORIDE SERPL-SCNC: 105 MMOL/L (ref 99–110)
CHOLEST SERPL-MCNC: 121 MG/DL (ref 0–199)
CO2 SERPL-SCNC: 24 MMOL/L (ref 21–32)
CREAT SERPL-MCNC: 0.7 MG/DL (ref 0.6–1.2)
GFR SERPLBLD CREATININE-BSD FMLA CKD-EPI: >90 ML/MIN/{1.73_M2}
GLUCOSE SERPL-MCNC: 102 MG/DL (ref 70–99)
HDLC SERPL-MCNC: 49 MG/DL (ref 40–60)
LDLC SERPL CALC-MCNC: 44 MG/DL
POTASSIUM SERPL-SCNC: 3.9 MMOL/L (ref 3.5–5.1)
PROT SERPL-MCNC: 6.3 G/DL (ref 6.4–8.2)
SODIUM SERPL-SCNC: 142 MMOL/L (ref 136–145)
TRIGL SERPL-MCNC: 138 MG/DL (ref 0–150)
VLDLC SERPL CALC-MCNC: 28 MG/DL

## 2025-06-16 ENCOUNTER — TELEPHONE (OUTPATIENT)
Dept: FAMILY MEDICINE CLINIC | Age: 65
End: 2025-06-16

## 2025-06-16 DIAGNOSIS — G89.29 OTHER CHRONIC BACK PAIN: ICD-10-CM

## 2025-06-16 DIAGNOSIS — E66.813 CLASS 3 SEVERE OBESITY DUE TO EXCESS CALORIES WITH SERIOUS COMORBIDITY AND BODY MASS INDEX (BMI) OF 50.0 TO 59.9 IN ADULT (HCC): Primary | ICD-10-CM

## 2025-06-16 DIAGNOSIS — M54.89 OTHER CHRONIC BACK PAIN: ICD-10-CM

## 2025-06-16 DIAGNOSIS — E78.2 MIXED HYPERLIPIDEMIA: ICD-10-CM

## 2025-06-16 DIAGNOSIS — I10 ESSENTIAL HYPERTENSION: ICD-10-CM

## 2025-06-16 DIAGNOSIS — F33.1 MODERATE EPISODE OF RECURRENT MAJOR DEPRESSIVE DISORDER (HCC): ICD-10-CM

## 2025-06-16 DIAGNOSIS — Z98.84 S/P LAPAROSCOPIC SLEEVE GASTRECTOMY: ICD-10-CM

## 2025-06-16 DIAGNOSIS — R73.01 ELEVATED FASTING GLUCOSE: ICD-10-CM

## 2025-06-16 NOTE — TELEPHONE ENCOUNTER
Patient states she is not using [facility], she is using [facility] and is paying for this out of pocket.

## 2025-06-16 NOTE — TELEPHONE ENCOUNTER
Pt states that she is done with the Mounjaro and needs the Zepbound sent to the pharmacy. She states that you told her to call when she was done with the Mounjaro. [facility].

## (undated) DEVICE — SOLUTION IV IRRIG 500ML 0.9% SODIUM CHL 2F7123

## (undated) DEVICE — Z INACTIVE USE 2660664 SOLUTION IRRIG 3000ML 0.9% SOD CHL USP UROMATIC PLAS CONT

## (undated) DEVICE — Device

## (undated) DEVICE — SUTURE VCRL + SZ 0 L27IN ABSRB UD L36MM CT-1 1/2 CIR VCPP41D

## (undated) DEVICE — GAUZE,SPONGE,4"X4",16PLY,XRAY,STRL,LF: Brand: MEDLINE

## (undated) DEVICE — 35 ML SYRINGE LUER-LOCK TIP: Brand: MONOJECT

## (undated) DEVICE — GLOVE ORANGE PI 8 1/2   MSG9085

## (undated) DEVICE — SUTURE MCRYL + SZ 4-0 L18IN ABSRB UD L19MM PS-2 3/8 CIR MCP496G

## (undated) DEVICE — NEEDLE SPNL L3.5IN PNK HUB S STL REG WALL FIT STYL W/ QNCKE

## (undated) DEVICE — SPLINT KNEE L20IN FOR 32IN THGH UNIV FOAM 3 PC DSGN TRIMMED

## (undated) DEVICE — TUBE SUCT L10IN MINI FLTR CRV KAMVAC

## (undated) DEVICE — SPONGE LAP W18XL18IN WHT COT 4 PLY FLD STRUNG RADPQ DISP ST

## (undated) DEVICE — LIGHT HANDLE: Brand: DEVON

## (undated) DEVICE — SUTURE ABSORBABLE BRAIDED 2-0 CT-1 27 IN UD VICRYL J259H

## (undated) DEVICE — GOWN,AURORA,NONREINF,RAGLAN,XXL,STERILE: Brand: MEDLINE

## (undated) DEVICE — COVER,MAYO STAND,STERILE: Brand: MEDLINE

## (undated) DEVICE — BLADE SURG SAW SAG S STL AGG 905MM LEN 185MM W 127MM THCK

## (undated) DEVICE — APPLICATOR PREP 26ML 0.7% IOD POVACRYLEX 74% ISO ALC ST